# Patient Record
Sex: FEMALE | Race: WHITE | Employment: OTHER | URBAN - METROPOLITAN AREA
[De-identification: names, ages, dates, MRNs, and addresses within clinical notes are randomized per-mention and may not be internally consistent; named-entity substitution may affect disease eponyms.]

---

## 2017-03-10 PROBLEM — E83.51 HYPOCALCEMIA: Status: ACTIVE | Noted: 2017-03-10

## 2017-03-10 PROBLEM — F32.9 REACTIVE DEPRESSION: Status: ACTIVE | Noted: 2017-03-10

## 2018-04-05 ENCOUNTER — HOSPITAL ENCOUNTER (OUTPATIENT)
Age: 28
Discharge: HOME | End: 2018-04-05
Payer: MEDICARE

## 2018-04-05 LAB
ALBUMIN SERPL-MCNC: 4.1 G/DL (ref 3.5–5.2)
ALP BLD-CCNC: 47 U/L (ref 35–104)
ALT SERPL-CCNC: 27 U/L (ref 0–32)
ANION GAP SERPL CALCULATED.3IONS-SCNC: 11 MMOL/L (ref 7–16)
AST SERPL-CCNC: 24 U/L (ref 0–31)
BASOPHILS ABSOLUTE: 0.04 E9/L (ref 0–0.2)
BASOPHILS RELATIVE PERCENT: 0.4 % (ref 0–2)
BILIRUB SERPL-MCNC: 0.3 MG/DL (ref 0–1.2)
BUN BLDV-MCNC: 10 MG/DL (ref 6–20)
CALCIUM IONIZED: 0.93 MMOL/L (ref 1.15–1.33)
CALCIUM SERPL-MCNC: 7 MG/DL (ref 8.6–10.2)
CHLORIDE BLD-SCNC: 97 MMOL/L (ref 98–107)
CO2: 31 MMOL/L (ref 22–29)
CREAT SERPL-MCNC: 0.6 MG/DL (ref 0.5–1)
EOSINOPHILS ABSOLUTE: 0.24 E9/L (ref 0.05–0.5)
EOSINOPHILS RELATIVE PERCENT: 2.2 % (ref 0–6)
GFR AFRICAN AMERICAN: >60
GFR NON-AFRICAN AMERICAN: >60 ML/MIN/1.73
GLUCOSE BLD-MCNC: 106 MG/DL (ref 74–109)
HCT VFR BLD CALC: 41 % (ref 34–48)
HEMOGLOBIN: 13.4 G/DL (ref 11.5–15.5)
IMMATURE GRANULOCYTES #: 0.05 E9/L
IMMATURE GRANULOCYTES %: 0.5 % (ref 0–5)
LYMPHOCYTES ABSOLUTE: 2.05 E9/L (ref 1.5–4)
LYMPHOCYTES RELATIVE PERCENT: 18.7 % (ref 20–42)
MAGNESIUM: 1.6 MG/DL (ref 1.6–2.6)
MCH RBC QN AUTO: 29.5 PG (ref 26–35)
MCHC RBC AUTO-ENTMCNC: 32.7 % (ref 32–34.5)
MCV RBC AUTO: 90.3 FL (ref 80–99.9)
MONOCYTES ABSOLUTE: 0.65 E9/L (ref 0.1–0.95)
MONOCYTES RELATIVE PERCENT: 5.9 % (ref 2–12)
NEUTROPHILS ABSOLUTE: 7.92 E9/L (ref 1.8–7.3)
NEUTROPHILS RELATIVE PERCENT: 72.3 % (ref 43–80)
PDW BLD-RTO: 13.5 FL (ref 11.5–15)
PLATELET # BLD: 155 E9/L (ref 130–450)
PMV BLD AUTO: 12 FL (ref 7–12)
POTASSIUM SERPL-SCNC: 4 MMOL/L (ref 3.5–5)
RBC # BLD: 4.54 E12/L (ref 3.5–5.5)
SODIUM BLD-SCNC: 139 MMOL/L (ref 132–146)
TOTAL PROTEIN: 7.9 G/DL (ref 6.4–8.3)
WBC # BLD: 11 E9/L (ref 4.5–11.5)

## 2018-04-05 PROCEDURE — 85025 COMPLETE CBC W/AUTO DIFF WBC: CPT

## 2018-04-05 PROCEDURE — 83735 ASSAY OF MAGNESIUM: CPT

## 2018-04-05 PROCEDURE — 80053 COMPREHEN METABOLIC PANEL: CPT

## 2018-04-05 PROCEDURE — 83970 ASSAY OF PARATHORMONE: CPT

## 2018-04-05 PROCEDURE — 82330 ASSAY OF CALCIUM: CPT

## 2018-04-05 PROCEDURE — 36415 COLL VENOUS BLD VENIPUNCTURE: CPT

## 2018-04-06 LAB — PARATHYROID HORMONE INTACT: 11 PG/ML (ref 15–65)

## 2018-04-13 ENCOUNTER — OFFICE VISIT (OUTPATIENT)
Dept: NEUROLOGY | Age: 28
End: 2018-04-13
Payer: MEDICARE

## 2018-04-13 VITALS
HEART RATE: 85 BPM | BODY MASS INDEX: 42.99 KG/M2 | RESPIRATION RATE: 20 BRPM | OXYGEN SATURATION: 93 % | SYSTOLIC BLOOD PRESSURE: 140 MMHG | HEIGHT: 65 IN | DIASTOLIC BLOOD PRESSURE: 81 MMHG | WEIGHT: 258 LBS

## 2018-04-13 DIAGNOSIS — R25.1 PHYSIOLOGICAL TREMOR: ICD-10-CM

## 2018-04-13 DIAGNOSIS — G40.409 OTHER GENERALIZED EPILEPSY, NOT INTRACTABLE, WITHOUT STATUS EPILEPTICUS (HCC): Primary | ICD-10-CM

## 2018-04-13 DIAGNOSIS — D82.1 DIGEORGE SYNDROME (HCC): ICD-10-CM

## 2018-04-13 PROCEDURE — 99214 OFFICE O/P EST MOD 30 MIN: CPT | Performed by: NURSE PRACTITIONER

## 2018-04-13 RX ORDER — LEVETIRACETAM 750 MG/1
750 TABLET ORAL 2 TIMES DAILY
Qty: 60 TABLET | Refills: 3 | Status: SHIPPED | OUTPATIENT
Start: 2018-04-13 | End: 2018-08-27 | Stop reason: SDUPTHER

## 2018-05-02 ENCOUNTER — HOSPITAL ENCOUNTER (OUTPATIENT)
Dept: GENERAL RADIOLOGY | Age: 28
Discharge: HOME | End: 2018-05-04
Payer: MEDICARE

## 2018-05-02 ENCOUNTER — HOSPITAL ENCOUNTER (OUTPATIENT)
Age: 28
Discharge: HOME | End: 2018-05-02
Payer: MEDICARE

## 2018-05-02 ENCOUNTER — HOSPITAL ENCOUNTER (OUTPATIENT)
Age: 28
Discharge: HOME | End: 2018-05-04
Payer: MEDICARE

## 2018-05-02 DIAGNOSIS — S67.21XA CRUSH INJURY TO HAND, RIGHT, INITIAL ENCOUNTER: Primary | ICD-10-CM

## 2018-05-02 DIAGNOSIS — S67.21XA CRUSH INJURY TO HAND, RIGHT, INITIAL ENCOUNTER: ICD-10-CM

## 2018-05-02 LAB
ALBUMIN SERPL-MCNC: 4.1 G/DL (ref 3.5–5.2)
ALP BLD-CCNC: 43 U/L (ref 35–104)
ALT SERPL-CCNC: 37 U/L (ref 0–32)
ANION GAP SERPL CALCULATED.3IONS-SCNC: 12 MMOL/L (ref 7–16)
AST SERPL-CCNC: 28 U/L (ref 0–31)
BASOPHILS ABSOLUTE: 0.04 E9/L (ref 0–0.2)
BASOPHILS RELATIVE PERCENT: 0.4 % (ref 0–2)
BILIRUB SERPL-MCNC: 0.3 MG/DL (ref 0–1.2)
BUN BLDV-MCNC: 14 MG/DL (ref 6–20)
CALCIUM SERPL-MCNC: 5.6 MG/DL (ref 8.6–10.2)
CHLORIDE BLD-SCNC: 98 MMOL/L (ref 98–107)
CO2: 32 MMOL/L (ref 22–29)
CREAT SERPL-MCNC: 0.6 MG/DL (ref 0.5–1)
EOSINOPHILS ABSOLUTE: 0.22 E9/L (ref 0.05–0.5)
EOSINOPHILS RELATIVE PERCENT: 2.1 % (ref 0–6)
GFR AFRICAN AMERICAN: >60
GFR NON-AFRICAN AMERICAN: >60 ML/MIN/1.73
GLUCOSE BLD-MCNC: 100 MG/DL (ref 74–109)
HCT VFR BLD CALC: 37.6 % (ref 34–48)
HEMOGLOBIN: 12.2 G/DL (ref 11.5–15.5)
IMMATURE GRANULOCYTES #: 0.06 E9/L
IMMATURE GRANULOCYTES %: 0.6 % (ref 0–5)
LYMPHOCYTES ABSOLUTE: 1.96 E9/L (ref 1.5–4)
LYMPHOCYTES RELATIVE PERCENT: 18.6 % (ref 20–42)
MCH RBC QN AUTO: 29.3 PG (ref 26–35)
MCHC RBC AUTO-ENTMCNC: 32.4 % (ref 32–34.5)
MCV RBC AUTO: 90.2 FL (ref 80–99.9)
MONOCYTES ABSOLUTE: 0.66 E9/L (ref 0.1–0.95)
MONOCYTES RELATIVE PERCENT: 6.3 % (ref 2–12)
NEUTROPHILS ABSOLUTE: 7.6 E9/L (ref 1.8–7.3)
NEUTROPHILS RELATIVE PERCENT: 72 % (ref 43–80)
PDW BLD-RTO: 14.3 FL (ref 11.5–15)
PLATELET # BLD: 156 E9/L (ref 130–450)
PMV BLD AUTO: 12.5 FL (ref 7–12)
POTASSIUM SERPL-SCNC: 3.5 MMOL/L (ref 3.5–5)
RBC # BLD: 4.17 E12/L (ref 3.5–5.5)
SODIUM BLD-SCNC: 142 MMOL/L (ref 132–146)
TOTAL PROTEIN: 7.8 G/DL (ref 6.4–8.3)
WBC # BLD: 10.5 E9/L (ref 4.5–11.5)

## 2018-05-02 PROCEDURE — 73120 X-RAY EXAM OF HAND: CPT

## 2018-05-02 PROCEDURE — 85025 COMPLETE CBC W/AUTO DIFF WBC: CPT

## 2018-05-02 PROCEDURE — 80053 COMPREHEN METABOLIC PANEL: CPT

## 2018-05-02 PROCEDURE — 36415 COLL VENOUS BLD VENIPUNCTURE: CPT

## 2018-05-29 ENCOUNTER — OFFICE VISIT (OUTPATIENT)
Dept: FAMILY MEDICINE CLINIC | Age: 28
End: 2018-05-29
Payer: MEDICARE

## 2018-05-29 VITALS
WEIGHT: 251 LBS | SYSTOLIC BLOOD PRESSURE: 128 MMHG | HEIGHT: 65 IN | BODY MASS INDEX: 41.82 KG/M2 | DIASTOLIC BLOOD PRESSURE: 82 MMHG | TEMPERATURE: 98.1 F | OXYGEN SATURATION: 97 % | HEART RATE: 88 BPM | RESPIRATION RATE: 16 BRPM

## 2018-05-29 DIAGNOSIS — E83.51 HYPOCALCEMIA: ICD-10-CM

## 2018-05-29 DIAGNOSIS — F32.A ANXIETY AND DEPRESSION: ICD-10-CM

## 2018-05-29 DIAGNOSIS — G47.00 INSOMNIA, UNSPECIFIED TYPE: ICD-10-CM

## 2018-05-29 DIAGNOSIS — F41.9 ANXIETY AND DEPRESSION: ICD-10-CM

## 2018-05-29 DIAGNOSIS — D82.1 DIGEORGE SYNDROME (HCC): ICD-10-CM

## 2018-05-29 DIAGNOSIS — Q93.81 VELOCARDIOFACIAL SYNDROME: Primary | ICD-10-CM

## 2018-05-29 DIAGNOSIS — R10.84 GENERALIZED ABDOMINAL PAIN: ICD-10-CM

## 2018-05-29 PROCEDURE — 99213 OFFICE O/P EST LOW 20 MIN: CPT | Performed by: NURSE PRACTITIONER

## 2018-05-29 ASSESSMENT — ENCOUNTER SYMPTOMS
COUGH: 0
WHEEZING: 0
NAUSEA: 0
ABDOMINAL PAIN: 1
SHORTNESS OF BREATH: 0
DIARRHEA: 0
CONSTIPATION: 0
VOMITING: 0

## 2018-05-29 ASSESSMENT — PATIENT HEALTH QUESTIONNAIRE - PHQ9
2. FEELING DOWN, DEPRESSED OR HOPELESS: 0
1. LITTLE INTEREST OR PLEASURE IN DOING THINGS: 0
SUM OF ALL RESPONSES TO PHQ QUESTIONS 1-9: 0
SUM OF ALL RESPONSES TO PHQ9 QUESTIONS 1 & 2: 0

## 2018-06-13 RX ORDER — IBUPROFEN 800 MG/1
800 TABLET ORAL EVERY 8 HOURS PRN
Qty: 90 TABLET | Refills: 3 | Status: SHIPPED | OUTPATIENT
Start: 2018-06-13 | End: 2018-10-11 | Stop reason: SDUPTHER

## 2018-06-20 ENCOUNTER — HOSPITAL ENCOUNTER (OUTPATIENT)
Dept: NEUROLOGY | Age: 28
Discharge: HOME | End: 2018-06-20
Payer: MEDICARE

## 2018-06-20 PROCEDURE — 95819 EEG AWAKE AND ASLEEP: CPT | Performed by: PSYCHIATRY & NEUROLOGY

## 2018-06-20 PROCEDURE — 95819 EEG AWAKE AND ASLEEP: CPT

## 2018-06-25 DIAGNOSIS — D82.1 DIGEORGE SYNDROME (HCC): ICD-10-CM

## 2018-06-25 DIAGNOSIS — G40.409 OTHER GENERALIZED EPILEPSY, NOT INTRACTABLE, WITHOUT STATUS EPILEPTICUS (HCC): ICD-10-CM

## 2018-08-15 ENCOUNTER — HOSPITAL ENCOUNTER (OUTPATIENT)
Age: 28
Discharge: HOME | End: 2018-08-15
Payer: MEDICARE

## 2018-08-15 LAB
ALBUMIN SERPL-MCNC: 4 G/DL (ref 3.5–5.2)
ALP BLD-CCNC: 48 U/L (ref 35–104)
ALT SERPL-CCNC: 31 U/L (ref 0–32)
ANION GAP SERPL CALCULATED.3IONS-SCNC: 14 MMOL/L (ref 7–16)
AST SERPL-CCNC: 34 U/L (ref 0–31)
BASOPHILS ABSOLUTE: 0.04 E9/L (ref 0–0.2)
BASOPHILS RELATIVE PERCENT: 0.4 % (ref 0–2)
BILIRUB SERPL-MCNC: 0.4 MG/DL (ref 0–1.2)
BUN BLDV-MCNC: 9 MG/DL (ref 6–20)
CALCIUM SERPL-MCNC: 5.7 MG/DL (ref 8.6–10.2)
CHLORIDE BLD-SCNC: 101 MMOL/L (ref 98–107)
CO2: 30 MMOL/L (ref 22–29)
CREAT SERPL-MCNC: 0.7 MG/DL (ref 0.5–1)
EOSINOPHILS ABSOLUTE: 0.2 E9/L (ref 0.05–0.5)
EOSINOPHILS RELATIVE PERCENT: 2 % (ref 0–6)
GFR AFRICAN AMERICAN: >60
GFR NON-AFRICAN AMERICAN: >60 ML/MIN/1.73
GLUCOSE BLD-MCNC: 123 MG/DL (ref 74–109)
HCT VFR BLD CALC: 41.2 % (ref 34–48)
HEMOGLOBIN: 13.2 G/DL (ref 11.5–15.5)
IMMATURE GRANULOCYTES #: 0.06 E9/L
IMMATURE GRANULOCYTES %: 0.6 % (ref 0–5)
LYMPHOCYTES ABSOLUTE: 1.91 E9/L (ref 1.5–4)
LYMPHOCYTES RELATIVE PERCENT: 19.3 % (ref 20–42)
MCH RBC QN AUTO: 29 PG (ref 26–35)
MCHC RBC AUTO-ENTMCNC: 32 % (ref 32–34.5)
MCV RBC AUTO: 90.5 FL (ref 80–99.9)
MONOCYTES ABSOLUTE: 0.58 E9/L (ref 0.1–0.95)
MONOCYTES RELATIVE PERCENT: 5.9 % (ref 2–12)
NEUTROPHILS ABSOLUTE: 7.09 E9/L (ref 1.8–7.3)
NEUTROPHILS RELATIVE PERCENT: 71.8 % (ref 43–80)
PDW BLD-RTO: 13.8 FL (ref 11.5–15)
PLATELET # BLD: 148 E9/L (ref 130–450)
PMV BLD AUTO: 13 FL (ref 7–12)
POTASSIUM SERPL-SCNC: 3.8 MMOL/L (ref 3.5–5)
RBC # BLD: 4.55 E12/L (ref 3.5–5.5)
SODIUM BLD-SCNC: 145 MMOL/L (ref 132–146)
TOTAL PROTEIN: 8.2 G/DL (ref 6.4–8.3)
WBC # BLD: 9.9 E9/L (ref 4.5–11.5)

## 2018-08-15 PROCEDURE — 87340 HEPATITIS B SURFACE AG IA: CPT

## 2018-08-15 PROCEDURE — 86803 HEPATITIS C AB TEST: CPT

## 2018-08-15 PROCEDURE — 80053 COMPREHEN METABOLIC PANEL: CPT

## 2018-08-15 PROCEDURE — 85025 COMPLETE CBC W/AUTO DIFF WBC: CPT

## 2018-08-15 PROCEDURE — 36415 COLL VENOUS BLD VENIPUNCTURE: CPT

## 2018-08-15 PROCEDURE — 86481 TB AG RESPONSE T-CELL SUSP: CPT

## 2018-08-15 PROCEDURE — 86706 HEP B SURFACE ANTIBODY: CPT

## 2018-08-16 LAB
HBV SURFACE AB TITR SER: NORMAL {TITER}
HEPATITIS B SURFACE ANTIGEN INTERPRETATION: NORMAL
HEPATITIS C ANTIBODY INTERPRETATION: NORMAL

## 2018-08-21 ENCOUNTER — OFFICE VISIT (OUTPATIENT)
Dept: FAMILY MEDICINE CLINIC | Age: 28
End: 2018-08-21
Payer: MEDICARE

## 2018-08-21 VITALS
SYSTOLIC BLOOD PRESSURE: 124 MMHG | BODY MASS INDEX: 43.32 KG/M2 | OXYGEN SATURATION: 96 % | HEIGHT: 65 IN | WEIGHT: 260 LBS | RESPIRATION RATE: 18 BRPM | HEART RATE: 85 BPM | TEMPERATURE: 98 F | DIASTOLIC BLOOD PRESSURE: 82 MMHG

## 2018-08-21 DIAGNOSIS — F41.9 ANXIETY AND DEPRESSION: ICD-10-CM

## 2018-08-21 DIAGNOSIS — J45.30 MILD PERSISTENT ASTHMA WITHOUT COMPLICATION: ICD-10-CM

## 2018-08-21 DIAGNOSIS — F32.A ANXIETY AND DEPRESSION: ICD-10-CM

## 2018-08-21 DIAGNOSIS — R13.10 DYSPHAGIA, UNSPECIFIED TYPE: ICD-10-CM

## 2018-08-21 DIAGNOSIS — R53.83 FATIGUE, UNSPECIFIED TYPE: Primary | ICD-10-CM

## 2018-08-21 DIAGNOSIS — D82.1 DIGEORGE SYNDROME (HCC): ICD-10-CM

## 2018-08-21 DIAGNOSIS — E83.51 HYPOCALCEMIA: ICD-10-CM

## 2018-08-21 LAB
COMMENT: NORMAL
REPORT: NORMAL

## 2018-08-21 PROCEDURE — 99214 OFFICE O/P EST MOD 30 MIN: CPT | Performed by: NURSE PRACTITIONER

## 2018-08-21 RX ORDER — PHENOL 1.4 %
2 AEROSOL, SPRAY (ML) MUCOUS MEMBRANE DAILY
Qty: 60 TABLET | Refills: 3 | Status: SHIPPED | OUTPATIENT
Start: 2018-08-21 | End: 2018-11-01 | Stop reason: SDUPTHER

## 2018-08-21 RX ORDER — ALBUTEROL SULFATE 90 UG/1
2 AEROSOL, METERED RESPIRATORY (INHALATION) EVERY 6 HOURS PRN
Qty: 1 INHALER | Refills: 1 | Status: SHIPPED
Start: 2018-08-21 | End: 2020-02-28 | Stop reason: SDUPTHER

## 2018-08-21 RX ORDER — SERTRALINE HYDROCHLORIDE 100 MG/1
150 TABLET, FILM COATED ORAL DAILY
Qty: 45 TABLET | Refills: 2 | Status: SHIPPED | OUTPATIENT
Start: 2018-08-21 | End: 2018-11-22 | Stop reason: SDUPTHER

## 2018-08-21 ASSESSMENT — ENCOUNTER SYMPTOMS
SHORTNESS OF BREATH: 0
VOMITING: 0
NAUSEA: 0
COUGH: 0
WHEEZING: 0
CONSTIPATION: 0
DIARRHEA: 0

## 2018-08-21 NOTE — PROGRESS NOTES
tablet Take 5 mg by mouth 2 times daily Yes Historical Provider, MD   Ciclopirox 1 % SHAM USE EVERY OTHER DAY AS A SHAMPOO Yes TWIN Buckley CNP   lamoTRIgine (LAMICTAL) 25 MG tablet TAKE ONE TABLET BY MOUTH TWO TIMES A DAY Yes Historical Provider, MD   cetirizine (ZYRTEC ALLERGY) 10 MG tablet Take 1 tablet by mouth daily Yes TWIN Saravia - CNP   Clobetasol Propionate Emulsion 0.05 % FOAM APPLY TO RASH ON SCALP TWICE A DAY AS NEEDED Yes Historical Provider, MD         Allergies   Allergen Reactions    Aspirin          Review of Systems  Review of Systems   Constitutional: Positive for malaise/fatigue. Negative for weight loss. Respiratory: Negative for cough, shortness of breath and wheezing. Cardiovascular: Negative for chest pain and palpitations. Gastrointestinal: Negative for constipation, diarrhea, nausea and vomiting. Neurological: Negative for dizziness, weakness and headaches. VS:  /82   Pulse 85   Temp 98 °F (36.7 °C)   Resp 18   Ht 5' 5\" (1.651 m)   Wt 260 lb (117.9 kg)   SpO2 96%   BMI 43.27 kg/m²     Physical Exam  Physical Exam   Constitutional: She is oriented to person, place, and time. She appears well-developed and well-nourished. No distress. HENT:   Head: Normocephalic and atraumatic. Neck: No tracheal deviation present. No thyromegaly present. Cardiovascular: Normal rate, regular rhythm and intact distal pulses. No murmur heard. Pulmonary/Chest: Effort normal and breath sounds normal. She has no wheezes. She has no rales. She exhibits no tenderness. Abdominal: Soft. Bowel sounds are normal. There is no tenderness. Lymphadenopathy:     She has no cervical adenopathy. Neurological: She is alert and oriented to person, place, and time. Skin: Skin is warm and dry. Psychiatric: She has a normal mood and affect. Her behavior is normal.         Assessment/Plan:  Peng was seen today for fatigue and shaking.     Diagnoses and all orders for this visit:    Fatigue, unspecified type  -restart zoloft  -advised need to schedule with mental health provider    Mild persistent asthma without complication  -     albuterol sulfate  (90 Base) MCG/ACT inhaler; Inhale 2 puffs into the lungs every 6 hours as needed for Wheezing  -     beclomethasone (QVAR) 40 MCG/ACT inhaler; Inhale 2 puffs into the lungs 2 times daily Rinse mouth after use  -The current medical regimen is effective;  continue present plan and medications. DiGeorge syndrome [facility])  -     [address] [facility]- Mildred Laser, DO    Dysphagia, unspecified type  -     [address] [facility]- Mildred Laser, DO  -patient did not follow up as instructed in the past    Anxiety and depression  -     sertraline (ZOLOFT) 100 MG tablet; Take 1.5 tablets by mouth daily  -schedule appointment with mental health provider    Hypocalcemia  -     calcium carbonate (CALCIUM 600) 600 MG TABS tablet;  Take 2 tablets by mouth daily  -was referred to Dr. Scooter Ramos in May and mother did not return calls to schedule    -will try to get results of sleep study    Greater than 25  Minutes was spent with patient and more than 50% of the time was spent face to face counseling and educating regarding diagnoses

## 2018-08-27 DIAGNOSIS — G40.409 OTHER GENERALIZED EPILEPSY, NOT INTRACTABLE, WITHOUT STATUS EPILEPTICUS (HCC): ICD-10-CM

## 2018-08-27 DIAGNOSIS — D82.1 DIGEORGE SYNDROME (HCC): ICD-10-CM

## 2018-08-27 RX ORDER — LEVETIRACETAM 750 MG/1
750 TABLET ORAL 2 TIMES DAILY
Qty: 60 TABLET | Refills: 3 | Status: SHIPPED | OUTPATIENT
Start: 2018-08-27 | End: 2019-06-05 | Stop reason: SDUPTHER

## 2018-09-21 ENCOUNTER — OFFICE VISIT (OUTPATIENT)
Dept: ENT CLINIC | Age: 28
End: 2018-09-21
Payer: MEDICARE

## 2018-09-21 VITALS
DIASTOLIC BLOOD PRESSURE: 83 MMHG | SYSTOLIC BLOOD PRESSURE: 124 MMHG | WEIGHT: 260.7 LBS | HEART RATE: 87 BPM | BODY MASS INDEX: 43.38 KG/M2

## 2018-09-21 DIAGNOSIS — K21.9 LPRD (LARYNGOPHARYNGEAL REFLUX DISEASE): Primary | ICD-10-CM

## 2018-09-21 DIAGNOSIS — H60.8X3 CHRONIC ECZEMATOUS OTITIS EXTERNA OF BOTH EARS: ICD-10-CM

## 2018-09-21 PROCEDURE — 99214 OFFICE O/P EST MOD 30 MIN: CPT | Performed by: OTOLARYNGOLOGY

## 2018-09-21 RX ORDER — RANITIDINE 300 MG/1
300 TABLET ORAL NIGHTLY
Qty: 30 TABLET | Refills: 3 | Status: SHIPPED | OUTPATIENT
Start: 2018-09-21 | End: 2019-06-11 | Stop reason: SDUPTHER

## 2018-09-21 NOTE — PATIENT INSTRUCTIONS
Gaviscon 1 teaspoon just before laying down at night   You have been given a prescription that has been electronically sent to your pharmacy for you to . Please take as directed. Patient Education        Gastroesophageal Reflux Disease (GERD): Care Instructions  Your Care Instructions    Gastroesophageal reflux disease (GERD) is the backward flow of stomach acid into the esophagus. The esophagus is the tube that leads from your throat to your stomach. A one-way valve prevents the stomach acid from moving up into this tube. When you have GERD, this valve does not close tightly enough. If you have mild GERD symptoms including heartburn, you may be able to control the problem with antacids or over-the-counter medicine. Changing your diet, losing weight, and making other lifestyle changes can also help reduce symptoms. Follow-up care is a key part of your treatment and safety. Be sure to make and go to all appointments, and call your doctor if you are having problems. It's also a good idea to know your test results and keep a list of the medicines you take. How can you care for yourself at home? · Take your medicines exactly as prescribed. Call your doctor if you think you are having a problem with your medicine. · Your doctor may recommend over-the-counter medicine. For mild or occasional indigestion, antacids, such as Tums, Gaviscon, Mylanta, or Maalox, may help. Your doctor also may recommend over-the-counter acid reducers, such as Pepcid AC, Tagamet HB, Zantac 75, or Prilosec. Read and follow all instructions on the label. If you use these medicines often, talk with your doctor. · Change your eating habits. ¨ It's best to eat several small meals instead of two or three large meals. ¨ After you eat, wait 2 to 3 hours before you lie down. ¨ Chocolate, mint, and alcohol can make GERD worse.   ¨ Spicy foods, foods that have a lot of acid (like tomatoes and oranges), and coffee can make GERD symptoms

## 2018-09-21 NOTE — PROGRESS NOTES
atraumatic. Right Ear: Hearing and ear canal normal. There is tenderness. Tympanic membrane is not scarred, not erythematous and not bulging. Tympanic membrane mobility is normal.   Left Ear: Hearing and ear canal normal. There is tenderness. Tympanic membrane is not scarred, not erythematous and not bulging. Tympanic membrane mobility is normal.   Ears:    Nose: No mucosal edema. Eyes: Pupils are equal, round, and reactive to light. Conjunctivae and EOM are normal.   Neck: Normal range of motion. Neck supple. Cardiovascular: Normal rate and intact distal pulses. Pulmonary/Chest: Effort normal and breath sounds normal. No respiratory distress. Abdominal: She exhibits no distension. There is no tenderness. There is no guarding. Musculoskeletal: Normal range of motion. Neurological: She is alert and oriented to person, place, and time. Skin: Skin is warm and dry. Psychiatric: She has a normal mood and affect. Her behavior is normal. Judgment and thought content normal.   Nursing note and vitals reviewed. IMPRESSION/PLAN:  Patient seen and examined, recommendation for the patient to undergo Zantac 300 mg daily, a reflux appropriate diet was recommended as well, and follow up in 6 weeks. No significant change recommended nasopharyngoscopy. 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 Dr. Deatrice Boast D. Bunevich D.O. Ms. Jalaine Sessions.  Otolaryngology Facial Plastic Surgery  :  [address] [place] [address] Otolaryngology/Facial Plastic Surgery Residency  Associate Clinical Professor:  [facility]

## 2018-10-09 ENCOUNTER — OFFICE VISIT (OUTPATIENT)
Dept: FAMILY MEDICINE CLINIC | Age: 28
End: 2018-10-09
Payer: MEDICARE

## 2018-10-09 VITALS
BODY MASS INDEX: 42.65 KG/M2 | DIASTOLIC BLOOD PRESSURE: 76 MMHG | HEIGHT: 65 IN | SYSTOLIC BLOOD PRESSURE: 128 MMHG | RESPIRATION RATE: 16 BRPM | OXYGEN SATURATION: 97 % | TEMPERATURE: 98.1 F | HEART RATE: 89 BPM | WEIGHT: 256 LBS

## 2018-10-09 DIAGNOSIS — L40.9 PSORIASIS: Primary | ICD-10-CM

## 2018-10-09 DIAGNOSIS — F41.9 ANXIETY: ICD-10-CM

## 2018-10-09 DIAGNOSIS — J30.2 SEASONAL ALLERGIES: ICD-10-CM

## 2018-10-09 PROCEDURE — 99213 OFFICE O/P EST LOW 20 MIN: CPT | Performed by: NURSE PRACTITIONER

## 2018-10-09 RX ORDER — CETIRIZINE HYDROCHLORIDE 10 MG/1
10 TABLET ORAL DAILY
Qty: 30 TABLET | Refills: 3 | Status: SHIPPED | OUTPATIENT
Start: 2018-10-09 | End: 2019-02-01 | Stop reason: SDUPTHER

## 2018-10-09 RX ORDER — BUSPIRONE HYDROCHLORIDE 5 MG/1
5 TABLET ORAL 2 TIMES DAILY
Qty: 60 TABLET | Refills: 2 | Status: SHIPPED | OUTPATIENT
Start: 2018-10-09 | End: 2019-01-05 | Stop reason: SDUPTHER

## 2018-10-09 ASSESSMENT — ENCOUNTER SYMPTOMS
DOUBLE VISION: 0
DIARRHEA: 0
NAUSEA: 0
VOMITING: 0
CONSTIPATION: 0
HEARTBURN: 0
WHEEZING: 0
VOMITING: 0
SHORTNESS OF BREATH: 0
COUGH: 0
BLURRED VISION: 0
SHORTNESS OF BREATH: 0
COUGH: 0

## 2018-10-09 NOTE — PROGRESS NOTES
HPI:  Patient comes in today for   Chief Complaint   Patient presents with    Psoriasis     pt started humria would like to discuss    . Patient has been on Humira for 3 weeks. Denies side effects    Prior to Visit Medications    Medication Sig Taking? Authorizing Provider   busPIRone (BUSPAR) 5 MG tablet Take 1 tablet by mouth 2 times daily Yes TWIN Osborne CNP   cetirizine (ZYRTEC ALLERGY) 10 MG tablet Take 1 tablet by mouth daily Yes Parth Wood, APRN - CNP   Adalimumab (HUMIRA) 40 MG/0.4ML PSKT Inject 80 mg into the skin once a week Yesterday and 1/2 dose next week then everyother week Yes Historical Provider, MD   ranitidine (ZANTAC) 300 MG tablet Take 1 tablet by mouth nightly  Patient taking differently: Take 300 mg by mouth 2 times daily  Yes Guicho Denny DO   levETIRAcetam (KEPPRA) 750 MG tablet Take 1 tablet by mouth 2 times daily Yes TWIN Nguyen   albuterol sulfate  (90 Base) MCG/ACT inhaler Inhale 2 puffs into the lungs every 6 hours as needed for Wheezing Yes Parth Neither, TWIN - CNP   beclomethasone (QVAR) 40 MCG/ACT inhaler Inhale 2 puffs into the lungs 2 times daily Rinse mouth after use Yes Parth Wood, TWIN - CNP   sertraline (ZOLOFT) 100 MG tablet Take 1.5 tablets by mouth daily Yes Parth Neither, TWIN - MICHAEL   calcium carbonate (CALCIUM 600) 600 MG TABS tablet Take 2 tablets by mouth daily Yes Parth Neither, TWIN - CNP   ibuprofen (ADVIL;MOTRIN) 800 MG tablet Take 1 tablet by mouth every 8 hours as needed for Pain Yes Parth Neither, TWIN - CNP   clobetasol (TEMOVATE) 0.05 % cream Apply topically 2 times daily Apply topically 2 times daily.  Yes Parth Wood, TWIN - CNP   Ciclopirox 1 % SHAM USE EVERY OTHER DAY AS A SHAMPOO Yes Parth Wood, TWIN - CNP   lamoTRIgine (LAMICTAL) 25 MG tablet TAKE ONE TABLET BY MOUTH TWO TIMES A DAY Yes Historical Provider, MD   Clobetasol Propionate Emulsion 0.05 % FOAM APPLY TO RASH ON SCALP TWICE A DAY AS

## 2018-10-11 RX ORDER — IBUPROFEN 800 MG/1
TABLET ORAL
Qty: 90 TABLET | Refills: 2 | Status: SHIPPED | OUTPATIENT
Start: 2018-10-11 | End: 2019-01-05 | Stop reason: SDUPTHER

## 2018-11-01 ENCOUNTER — OFFICE VISIT (OUTPATIENT)
Dept: ENDOCRINOLOGY | Age: 28
End: 2018-11-01
Payer: MEDICARE

## 2018-11-01 ENCOUNTER — HOSPITAL ENCOUNTER (EMERGENCY)
Age: 28
Discharge: HOME | End: 2018-11-01
Attending: EMERGENCY MEDICINE
Payer: MEDICARE

## 2018-11-01 VITALS
SYSTOLIC BLOOD PRESSURE: 116 MMHG | DIASTOLIC BLOOD PRESSURE: 80 MMHG | WEIGHT: 262.7 LBS | BODY MASS INDEX: 43.77 KG/M2 | HEIGHT: 65 IN | HEART RATE: 92 BPM | OXYGEN SATURATION: 96 %

## 2018-11-01 VITALS
DIASTOLIC BLOOD PRESSURE: 76 MMHG | RESPIRATION RATE: 17 BRPM | BODY MASS INDEX: 43.91 KG/M2 | HEIGHT: 65 IN | HEART RATE: 83 BPM | WEIGHT: 263.56 LBS | TEMPERATURE: 97.6 F | SYSTOLIC BLOOD PRESSURE: 132 MMHG | OXYGEN SATURATION: 96 %

## 2018-11-01 DIAGNOSIS — R94.31 PROLONGED Q-T INTERVAL ON ECG: ICD-10-CM

## 2018-11-01 DIAGNOSIS — E83.51 HYPOCALCEMIA: ICD-10-CM

## 2018-11-01 DIAGNOSIS — E83.51 HYPOCALCEMIA: Primary | ICD-10-CM

## 2018-11-01 DIAGNOSIS — E20.9 HYPOPARATHYROIDISM, UNSPECIFIED HYPOPARATHYROIDISM TYPE (HCC): Primary | ICD-10-CM

## 2018-11-01 LAB
ALBUMIN SERPL-MCNC: 4.1 G/DL (ref 3.5–5.2)
ALP BLD-CCNC: 41 U/L (ref 35–104)
ALT SERPL-CCNC: 22 U/L (ref 0–32)
ANION GAP SERPL CALCULATED.3IONS-SCNC: 12 MMOL/L (ref 7–16)
ANION GAP SERPL CALCULATED.3IONS-SCNC: 13 MMOL/L (ref 7–16)
AST SERPL-CCNC: 27 U/L (ref 0–31)
BILIRUB SERPL-MCNC: 0.2 MG/DL (ref 0–1.2)
BUN BLDV-MCNC: 13 MG/DL (ref 6–20)
BUN BLDV-MCNC: 14 MG/DL (ref 6–20)
CALCIUM SERPL-MCNC: 6.1 MG/DL (ref 8.6–10.2)
CALCIUM SERPL-MCNC: 6.9 MG/DL (ref 8.6–10.2)
CHLORIDE BLD-SCNC: 97 MMOL/L (ref 98–107)
CHLORIDE BLD-SCNC: 97 MMOL/L (ref 98–107)
CO2: 30 MMOL/L (ref 22–29)
CO2: 30 MMOL/L (ref 22–29)
CREAT SERPL-MCNC: 0.6 MG/DL (ref 0.5–1)
CREAT SERPL-MCNC: 0.7 MG/DL (ref 0.5–1)
GFR AFRICAN AMERICAN: >60
GFR AFRICAN AMERICAN: >60
GFR NON-AFRICAN AMERICAN: >60 ML/MIN/1.73
GFR NON-AFRICAN AMERICAN: >60 ML/MIN/1.73
GLUCOSE BLD-MCNC: 117 MG/DL (ref 74–109)
GLUCOSE BLD-MCNC: 132 MG/DL (ref 74–109)
MAGNESIUM: 1.5 MG/DL (ref 1.6–2.6)
PHOSPHORUS: 4.7 MG/DL (ref 2.5–4.5)
POTASSIUM SERPL-SCNC: 3.9 MMOL/L (ref 3.5–5)
POTASSIUM SERPL-SCNC: 4.1 MMOL/L (ref 3.5–5)
SODIUM BLD-SCNC: 139 MMOL/L (ref 132–146)
SODIUM BLD-SCNC: 140 MMOL/L (ref 132–146)
TOTAL PROTEIN: 8 G/DL (ref 6.4–8.3)

## 2018-11-01 PROCEDURE — 93005 ELECTROCARDIOGRAM TRACING: CPT | Performed by: EMERGENCY MEDICINE

## 2018-11-01 PROCEDURE — 96365 THER/PROPH/DIAG IV INF INIT: CPT

## 2018-11-01 PROCEDURE — 80053 COMPREHEN METABOLIC PANEL: CPT

## 2018-11-01 PROCEDURE — 84100 ASSAY OF PHOSPHORUS: CPT

## 2018-11-01 PROCEDURE — 99282 EMERGENCY DEPT VISIT SF MDM: CPT

## 2018-11-01 PROCEDURE — 99204 OFFICE O/P NEW MOD 45 MIN: CPT | Performed by: INTERNAL MEDICINE

## 2018-11-01 PROCEDURE — 83735 ASSAY OF MAGNESIUM: CPT

## 2018-11-01 PROCEDURE — 80048 BASIC METABOLIC PNL TOTAL CA: CPT

## 2018-11-01 PROCEDURE — 36415 COLL VENOUS BLD VENIPUNCTURE: CPT

## 2018-11-01 PROCEDURE — 6370000000 HC RX 637 (ALT 250 FOR IP): Performed by: EMERGENCY MEDICINE

## 2018-11-01 PROCEDURE — 2580000003 HC RX 258: Performed by: EMERGENCY MEDICINE

## 2018-11-01 PROCEDURE — 6360000002 HC RX W HCPCS: Performed by: EMERGENCY MEDICINE

## 2018-11-01 RX ORDER — PHENOL 1.4 %
AEROSOL, SPRAY (ML) MUCOUS MEMBRANE
Qty: 180 TABLET | Refills: 12 | Status: SHIPPED | OUTPATIENT
Start: 2018-11-01 | End: 2019-04-04 | Stop reason: SDUPTHER

## 2018-11-01 RX ORDER — CALCITRIOL 0.5 UG/1
0.5 CAPSULE, LIQUID FILLED ORAL 2 TIMES DAILY
Qty: 90 CAPSULE | Refills: 5 | Status: SHIPPED | OUTPATIENT
Start: 2018-11-01 | End: 2019-05-03 | Stop reason: SDUPTHER

## 2018-11-01 RX ORDER — CALCIUM CARBONATE 200(500)MG
2000 TABLET,CHEWABLE ORAL ONCE
Status: COMPLETED | OUTPATIENT
Start: 2018-11-01 | End: 2018-11-01

## 2018-11-01 RX ADMIN — CALCIUM CARBONATE (ANTACID) CHEW TAB 500 MG 2000 MG: 500 CHEW TAB at 17:02

## 2018-11-01 RX ADMIN — CALCIUM GLUCONATE 1 G: 98 INJECTION, SOLUTION INTRAVENOUS at 16:14

## 2018-11-01 RX ADMIN — CALCIUM GLUCONATE 1 G: 98 INJECTION, SOLUTION INTRAVENOUS at 17:45

## 2018-11-01 ASSESSMENT — PAIN DESCRIPTION - LOCATION: LOCATION: ABDOMEN

## 2018-11-01 ASSESSMENT — ENCOUNTER SYMPTOMS
SHORTNESS OF BREATH: 0
NAUSEA: 0
DIARRHEA: 0
VOMITING: 0
BLURRED VISION: 0
ABDOMINAL PAIN: 0

## 2018-11-01 ASSESSMENT — PAIN DESCRIPTION - PAIN TYPE: TYPE: ACUTE PAIN

## 2018-11-01 NOTE — PROGRESS NOTES
12:54 PM      Lab Results   Component Value Date    PTH 11 04/06/2018    PTH 12 03/13/2017     Lab Results   Component Value Date    MG 1.6 04/05/2018     Lab Results   Component Value Date    PHOS 4.9 03/09/2017     No results found for: VITD25  No results found for: PTHRP  No results found for: Clemente Fitting  No results found for: Angeles Peters  Lab Results   Component Value Date/Time    WBC 9.9 08/15/2018 12:54 PM    RBC 4.55 08/15/2018 12:54 PM    HGB 13.2 08/15/2018 12:54 PM    HCT 41.2 08/15/2018 12:54 PM    MCV 90.5 08/15/2018 12:54 PM    MCH 29.0 08/15/2018 12:54 PM    MCHC 32.0 08/15/2018 12:54 PM    RDW 13.8 08/15/2018 12:54 PM     08/15/2018 12:54 PM    MPV 13.0 (H) 08/15/2018 12:54 PM     Lab Results   Component Value Date/Time    GLUCOSE 123 (H) 08/15/2018 12:54 PM    GLUCOSE 100 05/02/2018 12:08 PM    GLUCOSE 106 04/05/2018 01:39 PM    GLUCOSE 84 03/13/2017 06:20 PM    LABA1C 5.9 03/10/2017 02:52 PM       All labs medical records and images reviewed independently     Impression and plan:  Ryan Bautista who is 34 y.o. female in the clinic today for management hypocalcemia and hypoparathyroidism     Congenital hypoparathyroidism   · Know case of Digeorge syndrome  · I had a long discussion with the pt and her mother and explained the dangerous of keeping calcium in this level   · Will start Calcitriol 0.5 mcg BID, VitD 45373 iu/wk, increase calcium carbonate to 1200 mg TID   · Check CMP, PTH, Phosphorus, magnesium, vitD level   · Check Calcium level in few days  · I also sent her to ER to get an EKG and to correct sever hypocalcemia with IV calcium therapy     Follow up  · 6 weeks     The above issues were reviewed with the patient who understood and agreed with the plan. 30 minutes were spent today in management of this patient. More than 50% of time spent on counseling of patient on above diagnosis. Thank you for allowing us to participate in the care of this patient.  Please do not hesitate to contact us with any additional questions. Diagnosis Orders   1.  Hypocalcemia  Basic Metabolic Panel    Vitamin D 25 Hydrox, D2 & D3    calcium carbonate (CALCIUM 600) 600 MG TABS tablet    PHOSPHORUS       Brittany Lima MD  Endocrinologist, [facility])   [address] [facility], [address] [place] [address],[facility] [id number]   Phone: [phone]  Fax: [phone]  ------------------------------

## 2018-11-01 NOTE — LETTER
[address] [place] [address] Department of Endocrinology Diabetes and Metabolism       Provider: Raymundo Keller MD  [address] [facility] [address]   Phone: [phone]  Fax: [phone]    Primary Care Physician: Axel Gunn DO   Referring Provider: TWIN Cardona -*    Patient: Simon Gutierrez  YOB: 1990  Date of Visit: 11/1/2018      Dear Dr. Axel Gunn DO     Thank you for the request for consultation for Simon Gutierrez to me. Enclosed is a copy of the office visit completed today. If you have questions, please do not hesitate to call me. I look forward to following Simon Gutierrez along with you. ENDOCRINOLOGY CLINIC NOTE    Date of Service: 11/1/2018    Medical Records Reviewed:   Inpatient records, outpatient records, outside records      Care Team:  Primary Care Physician: Axel Gunn DO. Provider: Raymundo Keller MD  Other provider(s):            Reason for the visit:  Hypoparathyroidism, Hypocalcemia     Type of visit:  Initial visit     HPI  The history is provided by the patient. No  was used. Accuracy of the patient data is excellent. Simon Gutierrez is a very pleasant 34 y.o. 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 I have reviewed all her previous lab and calcium level has been running between 5.4 and 5.6 for many years. Previous labs summarized below    She is consistently complaining of symptoms of hypocalcemia (tingling around mouth, finger tips and muscle cramps)     She is currently only on calcium carbonate 600 mg BID.  Not on vitd and not on calcitriol   Not on vitD supplements     She hasnt seen and endocrinologist for many years     He patient denied any h/o kidney stones, spontaneous fractures, or peptic ulcer disease

## 2018-11-01 NOTE — ED PROVIDER NOTES
mother. The patients home medications have been reviewed.     Allergies: Aspirin    -------------------------------------------------- RESULTS -------------------------------------------------  All laboratory and radiology results have been personally reviewed by myself   LABS:  Results for orders placed or performed during the hospital encounter of 11/01/18   Comprehensive Metabolic Panel   Result Value Ref Range    Sodium 140 132 - 146 mmol/L    Potassium 4.1 3.5 - 5.0 mmol/L    Chloride 97 (L) 98 - 107 mmol/L    CO2 30 (H) 22 - 29 mmol/L    Anion Gap 13 7 - 16 mmol/L    Glucose 117 (H) 74 - 109 mg/dL    BUN 14 6 - 20 mg/dL    CREATININE 0.7 0.5 - 1.0 mg/dL    GFR Non-African American >60 >=60 mL/min/1.73    GFR African American >60     Calcium 6.1 (L) 8.6 - 10.2 mg/dL    Total Protein 8.0 6.4 - 8.3 g/dL    Alb 4.1 3.5 - 5.2 g/dL    Total Bilirubin 0.2 0.0 - 1.2 mg/dL    Alkaline Phosphatase 41 35 - 104 U/L    ALT 22 0 - 32 U/L    AST 27 0 - 31 U/L   Magnesium   Result Value Ref Range    Magnesium 1.5 (L) 1.6 - 2.6 mg/dL   Phosphorus   Result Value Ref Range    Phosphorus 4.7 (H) 2.5 - 4.5 mg/dL   Basic Metabolic Panel   Result Value Ref Range    Sodium 139 132 - 146 mmol/L    Potassium 3.9 3.5 - 5.0 mmol/L    Chloride 97 (L) 98 - 107 mmol/L    CO2 30 (H) 22 - 29 mmol/L    Anion Gap 12 7 - 16 mmol/L    Glucose 132 (H) 74 - 109 mg/dL    BUN 13 6 - 20 mg/dL    CREATININE 0.6 0.5 - 1.0 mg/dL    GFR Non-African American >60 >=60 mL/min/1.73    GFR African American >60     Calcium 6.9 (L) 8.6 - 10.2 mg/dL   EKG 12 Lead   Result Value Ref Range    Ventricular Rate 84 BPM    Atrial Rate 84 BPM    P-R Interval 148 ms    QRS Duration 100 ms    Q-T Interval 408 ms    QTc Calculation (Bazett) 482 ms    P Axis 47 degrees    R Axis 11 degrees    T Axis -16 degrees   EKG 12 Lead   Result Value Ref Range    Ventricular Rate 89 BPM    Atrial Rate 89 BPM    P-R Interval 154 ms    QRS Duration 100 ms    Q-T Interval 414 ms

## 2018-11-02 LAB
EKG ATRIAL RATE: 84 BPM
EKG P AXIS: 47 DEGREES
EKG P-R INTERVAL: 148 MS
EKG Q-T INTERVAL: 408 MS
EKG QRS DURATION: 100 MS
EKG QTC CALCULATION (BAZETT): 482 MS
EKG R AXIS: 11 DEGREES
EKG T AXIS: -16 DEGREES
EKG VENTRICULAR RATE: 84 BPM

## 2018-11-09 LAB
EKG ATRIAL RATE: 89 BPM
EKG P AXIS: 41 DEGREES
EKG P-R INTERVAL: 154 MS
EKG Q-T INTERVAL: 414 MS
EKG QRS DURATION: 100 MS
EKG QTC CALCULATION (BAZETT): 503 MS
EKG R AXIS: 17 DEGREES
EKG T AXIS: -17 DEGREES
EKG VENTRICULAR RATE: 89 BPM

## 2018-11-22 DIAGNOSIS — F32.A ANXIETY AND DEPRESSION: ICD-10-CM

## 2018-11-22 DIAGNOSIS — F41.9 ANXIETY AND DEPRESSION: ICD-10-CM

## 2018-11-26 RX ORDER — SERTRALINE HYDROCHLORIDE 100 MG/1
TABLET, FILM COATED ORAL
Qty: 45 TABLET | Refills: 1 | Status: SHIPPED | OUTPATIENT
Start: 2018-11-26 | End: 2019-01-04 | Stop reason: SDUPTHER

## 2018-12-27 ENCOUNTER — TELEPHONE (OUTPATIENT)
Dept: NEUROLOGY | Age: 28
End: 2018-12-27

## 2019-01-04 DIAGNOSIS — F32.A ANXIETY AND DEPRESSION: ICD-10-CM

## 2019-01-04 DIAGNOSIS — F41.9 ANXIETY AND DEPRESSION: ICD-10-CM

## 2019-01-04 RX ORDER — SERTRALINE HYDROCHLORIDE 100 MG/1
TABLET, FILM COATED ORAL
Qty: 45 TABLET | Refills: 1 | Status: SHIPPED | OUTPATIENT
Start: 2019-01-04 | End: 2019-04-04 | Stop reason: SDUPTHER

## 2019-01-04 RX ORDER — LAMOTRIGINE 25 MG/1
TABLET ORAL
Qty: 60 TABLET | Refills: 1 | Status: SHIPPED | OUTPATIENT
Start: 2019-01-04 | End: 2019-04-23 | Stop reason: SDUPTHER

## 2019-01-05 DIAGNOSIS — F41.9 ANXIETY: ICD-10-CM

## 2019-01-07 RX ORDER — BUSPIRONE HYDROCHLORIDE 5 MG/1
TABLET ORAL
Qty: 60 TABLET | Refills: 1 | Status: SHIPPED | OUTPATIENT
Start: 2019-01-07 | End: 2019-03-07 | Stop reason: SDUPTHER

## 2019-01-07 RX ORDER — IBUPROFEN 800 MG/1
TABLET ORAL
Qty: 90 TABLET | Refills: 1 | Status: SHIPPED | OUTPATIENT
Start: 2019-01-07 | End: 2019-03-07 | Stop reason: SDUPTHER

## 2019-01-25 ENCOUNTER — TELEPHONE (OUTPATIENT)
Dept: FAMILY MEDICINE CLINIC | Age: 29
End: 2019-01-25

## 2019-01-25 RX ORDER — AMOXICILLIN 500 MG/1
2000 CAPSULE ORAL ONCE
Qty: 4 CAPSULE | Refills: 0 | Status: SHIPPED | OUTPATIENT
Start: 2019-01-25 | End: 2019-01-25

## 2019-02-01 DIAGNOSIS — J30.2 SEASONAL ALLERGIES: ICD-10-CM

## 2019-02-01 RX ORDER — CETIRIZINE HYDROCHLORIDE 10 MG/1
TABLET ORAL
Qty: 30 TABLET | Refills: 2 | Status: SHIPPED
Start: 2019-02-01 | End: 2020-02-11 | Stop reason: SDUPTHER

## 2019-03-07 DIAGNOSIS — F41.9 ANXIETY: ICD-10-CM

## 2019-03-07 RX ORDER — IBUPROFEN 800 MG/1
TABLET ORAL
Qty: 90 TABLET | Refills: 0 | Status: SHIPPED
Start: 2019-03-07 | End: 2020-10-16 | Stop reason: SDUPTHER

## 2019-03-07 RX ORDER — BUSPIRONE HYDROCHLORIDE 5 MG/1
TABLET ORAL
Qty: 60 TABLET | Refills: 0 | Status: SHIPPED | OUTPATIENT
Start: 2019-03-07 | End: 2019-04-12 | Stop reason: SDUPTHER

## 2019-04-04 DIAGNOSIS — F41.9 ANXIETY AND DEPRESSION: ICD-10-CM

## 2019-04-04 DIAGNOSIS — E83.51 HYPOCALCEMIA: ICD-10-CM

## 2019-04-04 DIAGNOSIS — F32.A ANXIETY AND DEPRESSION: ICD-10-CM

## 2019-04-04 RX ORDER — SERTRALINE HYDROCHLORIDE 100 MG/1
TABLET, FILM COATED ORAL
Qty: 45 TABLET | Refills: 1 | Status: SHIPPED | OUTPATIENT
Start: 2019-04-04 | End: 2019-06-05 | Stop reason: SDUPTHER

## 2019-04-04 RX ORDER — PHENOL 1.4 %
AEROSOL, SPRAY (ML) MUCOUS MEMBRANE
Qty: 180 TABLET | Refills: 12 | Status: SHIPPED | OUTPATIENT
Start: 2019-04-04 | End: 2019-06-25 | Stop reason: SDUPTHER

## 2019-04-12 DIAGNOSIS — F41.9 ANXIETY: ICD-10-CM

## 2019-04-12 RX ORDER — BUSPIRONE HYDROCHLORIDE 5 MG/1
TABLET ORAL
Qty: 60 TABLET | Refills: 0 | Status: SHIPPED
Start: 2019-04-12 | End: 2021-04-19 | Stop reason: SDUPTHER

## 2019-04-15 ENCOUNTER — HOSPITAL ENCOUNTER (EMERGENCY)
Age: 29
Discharge: HOME | End: 2019-04-15
Payer: MEDICARE

## 2019-04-15 ENCOUNTER — APPOINTMENT (OUTPATIENT)
Dept: GENERAL RADIOLOGY | Age: 29
End: 2019-04-15
Payer: MEDICARE

## 2019-04-15 VITALS
WEIGHT: 230 LBS | BODY MASS INDEX: 38.27 KG/M2 | RESPIRATION RATE: 20 BRPM | DIASTOLIC BLOOD PRESSURE: 68 MMHG | SYSTOLIC BLOOD PRESSURE: 115 MMHG | HEART RATE: 89 BPM | OXYGEN SATURATION: 98 % | TEMPERATURE: 98.4 F

## 2019-04-15 DIAGNOSIS — S80.02XA CONTUSION OF LEFT KNEE, INITIAL ENCOUNTER: Primary | ICD-10-CM

## 2019-04-15 PROCEDURE — 73560 X-RAY EXAM OF KNEE 1 OR 2: CPT

## 2019-04-15 PROCEDURE — 99212 OFFICE O/P EST SF 10 MIN: CPT

## 2019-04-15 ASSESSMENT — PAIN DESCRIPTION - PAIN TYPE: TYPE: ACUTE PAIN

## 2019-04-15 ASSESSMENT — PAIN DESCRIPTION - ORIENTATION: ORIENTATION: LEFT

## 2019-04-15 ASSESSMENT — PAIN DESCRIPTION - DESCRIPTORS: DESCRIPTORS: ACHING;THROBBING

## 2019-04-15 ASSESSMENT — PAIN SCALES - GENERAL: PAINLEVEL_OUTOF10: 10

## 2019-04-23 RX ORDER — LAMOTRIGINE 25 MG/1
TABLET ORAL
Qty: 60 TABLET | Refills: 0 | Status: SHIPPED | OUTPATIENT
Start: 2019-04-23 | End: 2019-06-05 | Stop reason: SDUPTHER

## 2019-04-23 NOTE — ED PROVIDER NOTES
Department of Emergency Medicine  [address] [place] [address]  Provider Note  Admit Date/Time: 4/15/2019  7:33 PM  Room: 04/04  MRN: [id number]  Chief Complaint: Knee Injury (Pt c/o falling 5 days ago and injured her L knee)       History of Present Illness   Source of history provided by:  patient. History/Exam Limitations: none. Shilpa Lara is a 34 y.o. female who has a past medical history of:   Past Medical History:   Diagnosis Date    Development delay     DiGeorge syndrome ([address]     Fine motor delay     Obesity     Velocardiofacial syndrome     presents to the urgent care center by private car for valuation of left knee pain she fell on it 5 days ago and is still complaining of pain. She is here with her mother for evaluation since it hasn't gotten better they decided it needed to come in to get an x-ray. Not complaining of pain anywhere else or were no other injuries. ROS    Pertinent positives and negatives are stated within HPI, all other systems reviewed and are negative. Past Surgical History:   Procedure Laterality Date    CARDIAC SURGERY     Social History:  reports that she has never smoked. She has never used smokeless tobacco. She reports that she does not drink alcohol or use drugs. Family History: family history includes Diabetes in her mother. Allergies: Aspirin    Physical Exam   Oxygen Saturation Interpretation: Normal.   ED Triage Vitals [04/15/19 1935]   BP Temp Temp Source Pulse Resp SpO2 Height Weight   115/68 98.4 °F (36.9 °C) Oral 89 20 98 % -- 230 lb (104.3 kg)       Physical Exam  · Constitutional/General: Alert and oriented x3, well appearing, non toxic in NAD  · HEENT:  NC/NT. PERRLA,  Airway patent. · Neck: Supple, full ROM, non tender to palpation in the midline, no stridor, no crepitus, no meningeal signs  · Respiratory: Lungs clear to auscultation bilaterally, no wheezes, rales, or rhonchi.  Not in respiratory distress  · CV:  Regular rate. Regular rhythm. No murmurs, gallops, or rubs. 2+ distal pulses  · Chest: No chest wall tenderness  · GI:  Abdomen Soft, Non tender, Non distended. +BS. No rebound, guarding, or rigidity. No pulsatile masses. · Musculoskeletal: Moves all extremities x 4. Warm and well perfused, no clubbing, cyanosis, or edema. Capillary refill <3 seconds, she ambulates without difficulty there are no abrasions,  Ecchymosis,  or edema or abnormal warmth noted over the left knee, there is no erythema. · Integument: skin warm and dry. No rashes. · Lymphatic: no lymphadenopathy noted  · Neurologic: GCS 15, no focal deficits, symmetric strength 5/5 in the upper and lower extremities bilaterally  · Psychiatric: Normal Affect    Lab / Imaging Results   (All laboratory and radiology results have been personally reviewed by myself)  Labs:  No results found for this visit on 04/15/19. Imaging: All Radiology results interpreted by Radiologist unless otherwise noted. XR KNEE LEFT (1-2 VIEWS)   Final Result   No acute osseous abnormality. ED Course / Medical Decision Making   Medications - No data to display       Consult(s):   None    Procedure(s):   none    MDM:   Patient  fell on her left knee 5 days ago and still having pain I did obtain an x-ray. The exam is normal, there is no erythema or abnormal warmth there's no ecchymosis or abrasions noted. X-ray was negative. I just advised symptomatic treatment with Tylenol or ibuprofen and follow-up with the PCP. Counseling:    I have  spoken with the patient and discussed todays results, in addition to providing specific details for the plan of care and counseling regarding the diagnosis and prognosis. Questions are answered at this time and they are agreeable with the plan. Assessment      1.  Contusion of left knee, initial encounter      Plan   Discharge to home and advised to contact Addy Marcos DO  [address] [place] [address]

## 2019-05-03 ENCOUNTER — OFFICE VISIT (OUTPATIENT)
Dept: FAMILY MEDICINE CLINIC | Age: 29
End: 2019-05-03
Payer: MEDICARE

## 2019-05-03 VITALS
SYSTOLIC BLOOD PRESSURE: 136 MMHG | WEIGHT: 272.2 LBS | TEMPERATURE: 97.9 F | DIASTOLIC BLOOD PRESSURE: 82 MMHG | RESPIRATION RATE: 16 BRPM | HEART RATE: 91 BPM | HEIGHT: 65 IN | OXYGEN SATURATION: 96 % | BODY MASS INDEX: 45.35 KG/M2

## 2019-05-03 DIAGNOSIS — M25.50 ARTHRALGIA, UNSPECIFIED JOINT: Primary | ICD-10-CM

## 2019-05-03 PROCEDURE — 99213 OFFICE O/P EST LOW 20 MIN: CPT | Performed by: NURSE PRACTITIONER

## 2019-05-03 RX ORDER — CALCITRIOL 0.5 UG/1
0.5 CAPSULE, LIQUID FILLED ORAL 2 TIMES DAILY
Qty: 180 CAPSULE | Refills: 3 | Status: SHIPPED | OUTPATIENT
Start: 2019-05-03 | End: 2019-06-25 | Stop reason: SDUPTHER

## 2019-05-03 ASSESSMENT — PATIENT HEALTH QUESTIONNAIRE - PHQ9
1. LITTLE INTEREST OR PLEASURE IN DOING THINGS: 0
SUM OF ALL RESPONSES TO PHQ9 QUESTIONS 1 & 2: 2
SUM OF ALL RESPONSES TO PHQ QUESTIONS 1-9: 2
SUM OF ALL RESPONSES TO PHQ QUESTIONS 1-9: 2
2. FEELING DOWN, DEPRESSED OR HOPELESS: 2

## 2019-05-03 ASSESSMENT — ENCOUNTER SYMPTOMS
SHORTNESS OF BREATH: 1
WHEEZING: 0
CONSTIPATION: 1
DIARRHEA: 1
NAUSEA: 0
COUGH: 0
VOMITING: 0

## 2019-05-03 NOTE — PROGRESS NOTES
Yes TWIN Zuniga CNP   clobetasol (TEMOVATE) 0.05 % cream Apply topically 2 times daily Apply topically 2 times daily. Yes TWIN Zuniga CNP   Ciclopirox 1 % SHAM USE EVERY OTHER DAY AS A SHAMPOO Yes TWIN Zuniga CNP   Clobetasol Propionate Emulsion 0.05 % FOAM APPLY TO RASH ON SCALP TWICE A DAY AS NEEDED Yes Historical Provider, MD         Allergies   Allergen Reactions    Aspirin          Review of Systems  Review of Systems   Constitutional: Positive for unexpected weight change (gain). Negative for activity change and appetite change. Respiratory: Positive for shortness of breath (CHINO). Negative for cough and wheezing. Cardiovascular: Positive for leg swelling. Negative for chest pain and palpitations. Gastrointestinal: Positive for constipation and diarrhea. Negative for nausea and vomiting. Musculoskeletal: Positive for arthralgias. Neurological: Positive for headaches (chronic). Negative for light-headedness. Psychiatric/Behavioral: Positive for sleep disturbance. VS:  /82   Pulse 91   Temp 97.9 °F (36.6 °C) (Oral)   Resp 16   Ht 5' 5\" (1.651 m)   Wt 272 lb 3.2 oz (123.5 kg)   SpO2 96%   BMI 45.30 kg/m²     Physical Exam  Physical Exam   Constitutional: She is oriented to person, place, and time. She appears well-developed and well-nourished. No distress. HENT:   Head: Normocephalic and atraumatic. Neck: No tracheal deviation present. No thyromegaly present. Cardiovascular: Normal rate, regular rhythm, normal heart sounds and intact distal pulses. No murmur heard. Pulmonary/Chest: Effort normal and breath sounds normal. She has no wheezes. She has no rales. She exhibits no tenderness. Abdominal: Soft. Bowel sounds are normal. There is no tenderness. Musculoskeletal: She exhibits tenderness (tenderness to bilateral first MCP). Lymphadenopathy:     She has no cervical adenopathy.    Neurological: She is alert and oriented to person,

## 2019-05-03 NOTE — LETTER
[facility]  [address]  Phone: [phone]  Fax: [phone]    TWIN Huitron CNP        May 3, 2019     Patient: Guy Tabares   YOB: 1990   Date of Visit: 5/3/2019       To Whom It May Concern: It is my medical opinion that [facility] should be allowed to take her albuterol inhaler, qvar inhaler, and calcium carbonate while at work. .    If you have any questions or concerns, please don't hesitate to call.     Sincerely,        TWIN Huitron CNP

## 2019-05-31 ENCOUNTER — OFFICE VISIT (OUTPATIENT)
Dept: FAMILY MEDICINE CLINIC | Age: 29
End: 2019-05-31
Payer: MEDICARE

## 2019-05-31 VITALS
WEIGHT: 263 LBS | OXYGEN SATURATION: 95 % | DIASTOLIC BLOOD PRESSURE: 62 MMHG | HEIGHT: 65 IN | BODY MASS INDEX: 43.82 KG/M2 | HEART RATE: 102 BPM | TEMPERATURE: 97.8 F | RESPIRATION RATE: 18 BRPM | SYSTOLIC BLOOD PRESSURE: 112 MMHG

## 2019-05-31 DIAGNOSIS — J01.90 ACUTE SINUSITIS, RECURRENCE NOT SPECIFIED, UNSPECIFIED LOCATION: ICD-10-CM

## 2019-05-31 DIAGNOSIS — J02.9 SORE THROAT: Primary | ICD-10-CM

## 2019-05-31 LAB — S PYO AG THROAT QL: NORMAL

## 2019-05-31 PROCEDURE — 99213 OFFICE O/P EST LOW 20 MIN: CPT | Performed by: NURSE PRACTITIONER

## 2019-05-31 PROCEDURE — 87880 STREP A ASSAY W/OPTIC: CPT | Performed by: NURSE PRACTITIONER

## 2019-05-31 RX ORDER — AMOXICILLIN 500 MG/1
500 CAPSULE ORAL 3 TIMES DAILY
Qty: 21 CAPSULE | Refills: 0 | Status: SHIPPED | OUTPATIENT
Start: 2019-05-31 | End: 2019-06-07

## 2019-05-31 ASSESSMENT — ENCOUNTER SYMPTOMS
VOMITING: 0
WHEEZING: 1
DIARRHEA: 1
COUGH: 1
CONSTIPATION: 0
NAUSEA: 0
SHORTNESS OF BREATH: 0
SORE THROAT: 1

## 2019-05-31 NOTE — LETTER
----- Message from Loni Aponte sent at 7/11/2018  1:42 PM CDT -----  Contact: Pt  Pt calling regarding her injection medication        Pt call back number [phone]    Rx request for Vitamin B12 sent to Dr Hodges.     [facility]  [address]  Phone: [phone]  Fax: [phone]    TWIN Lewis CNP        May 31, 2019     Patient: Shawna Mom   YOB: 1990   Date of Visit: 5/31/2019       To Whom it May Concern:    Eve Patton was seen in my clinic on 5/31/2019. She may return to work on 6/3/2019. If you have any questions or concerns, please don't hesitate to call.     Sincerely,         TWIN Lewis CNP

## 2019-05-31 NOTE — PROGRESS NOTES
HPI:  Patient comes intoday for   Chief Complaint   Patient presents with    URI     hot, sore throat, sweaty, headache, cough, fatigue has been going on since last Monday   . Onset 5 to 6 days ago and is not resolving. She has been taking mucinex with no relief. Complains of sore throat, fever, headache, cough, sweats, and diarrhea. Prior to Visit Medications    Medication Sig Taking? Authorizing Provider   amoxicillin (AMOXIL) 500 MG capsule Take 1 capsule by mouth 3 times daily for 7 days Yes TWIN Rocha CNP   calcitRIOL (ROCALTROL) 0.5 MCG capsule Take 1 capsule by mouth 2 times daily Yes TWIN Rocha CNP   lamoTRIgine (LAMICTAL) 25 MG tablet TAKE ONE TABLET BY MOUTH TWO TIMES A DAY Yes TWIN Rocha CNP   Guselkumab (TREMFYA SC) Inject into the skin Yes Historical Provider, MD   busPIRone (BUSPAR) 5 MG tablet TAKE ONE TABLET BY MOUTH TWO TIMES A DAY Yes TWIN Weiss CNP   sertraline (ZOLOFT) 100 MG tablet TAKE 1 & 1/2 TABLETS BY MOUTH EVERY DAY Yes Prince Relic, DO   calcium carbonate (CALCIUM 600) 600 MG TABS tablet Take 2 tablet three times a day Yes Prince Relic, DO   ibuprofen (ADVIL;MOTRIN) 800 MG tablet TAKE ONE TABLET BY MOUTH EVERY 8 HOURS AS NEEDED FOR PAIN.  Yes Prince Relic, DO   cetirizine (ZYRTEC) 10 MG tablet TAKE ONE TABLET BY MOUTH DAILY Yes TWIN Sampson CNP   ranitidine (ZANTAC) 300 MG tablet Take 1 tablet by mouth nightly  Patient taking differently: Take 300 mg by mouth 2 times daily  Yes Guicho Denny,    levETIRAcetam (KEPPRA) 750 MG tablet Take 1 tablet by mouth 2 times daily Yes Gini Peabody, APRN - CNS   albuterol sulfate  (90 Base) MCG/ACT inhaler Inhale 2 puffs into the lungs every 6 hours as needed for Wheezing Yes TWIN Rocha CNP   beclomethasone (QVAR) 40 MCG/ACT inhaler Inhale 2 puffs into the lungs 2 times daily Rinse mouth after use Yes TWIN Rocha CNP clobetasol (TEMOVATE) 0.05 % cream Apply topically 2 times daily Apply topically 2 times daily. Yes TWIN Huitron CNP   Ciclopirox 1 % SHAM USE EVERY OTHER DAY AS A SHAMPOO Yes TWIN Huitron CNP   Clobetasol Propionate Emulsion 0.05 % FOAM APPLY TO RASH ON SCALP TWICE A DAY AS NEEDED Yes Historical Provider, MD         Allergies   Allergen Reactions    Aspirin          Review of Systems  Review of Systems   Constitutional: Positive for chills, diaphoresis and fever. HENT: Positive for congestion, ear pain and sore throat. Respiratory: Positive for cough (for past 2 days) and wheezing. Negative for shortness of breath. Cardiovascular: Negative for chest pain and palpitations. Gastrointestinal: Positive for diarrhea. Negative for constipation, nausea and vomiting. Musculoskeletal: Negative for myalgias. Skin: Negative for rash. Neurological: Positive for light-headedness and headaches. Negative for weakness. Psychiatric/Behavioral:        Hearing \"bad voices\", seeing counselor         VS:  /62   Pulse 102   Temp 97.8 °F (36.6 °C) (Oral)   Resp 18   Ht 5' 5\" (1.651 m)   Wt 263 lb (119.3 kg)   SpO2 95%   BMI 43.77 kg/m²     Physical Exam  Physical Exam   Constitutional: She is oriented to person, place, and time. She appears well-developed and well-nourished. No distress. HENT:   Head: Normocephalic and atraumatic. TMs partially obstructed by cerumen, nasal turbinates erythematous and boggy, pharynx difficult to visualize due to body habitus but appears mildly erythematous   Neck: No tracheal deviation present. No thyromegaly present. Cardiovascular: Normal rate, regular rhythm, normal heart sounds and intact distal pulses. No murmur heard. Pulmonary/Chest: Effort normal. She has wheezes. She has no rales. She exhibits no tenderness. Abdominal: Soft. Bowel sounds are normal. There is no tenderness. Lymphadenopathy:     She has no cervical adenopathy. Neurological: She is alert and oriented to person, place, and time. Skin: Skin is warm and dry. Psychiatric: She has a normal mood and affect. Her behavior is normal.         Assessment/Plan:  Watson Burton was seen today for uri. Diagnoses and all orders for this visit:    Sore throat  -     POCT rapid strep A  -     amoxicillin (AMOXIL) 500 MG capsule; Take 1 capsule by mouth 3 times daily for 7 days    Acute sinusitis, recurrence not specified, unspecified location  -     amoxicillin (AMOXIL) 500 MG capsule;  Take 1 capsule by mouth 3 times daily for 7 days  -contact office if symptoms do not improve within 48 hours or completely resolve within 2 weeks        Greater than 15  Minutes was spent with patient and more than 50% of the time was spent face to facecounseling and educating regarding diagnoses

## 2019-06-05 DIAGNOSIS — D82.1 DIGEORGE SYNDROME (HCC): ICD-10-CM

## 2019-06-05 DIAGNOSIS — F41.9 ANXIETY AND DEPRESSION: ICD-10-CM

## 2019-06-05 DIAGNOSIS — F32.A ANXIETY AND DEPRESSION: ICD-10-CM

## 2019-06-05 DIAGNOSIS — G40.409 OTHER GENERALIZED EPILEPSY, NOT INTRACTABLE, WITHOUT STATUS EPILEPTICUS (HCC): ICD-10-CM

## 2019-06-05 RX ORDER — SERTRALINE HYDROCHLORIDE 100 MG/1
TABLET, FILM COATED ORAL
Qty: 45 TABLET | Refills: 0 | Status: SHIPPED | OUTPATIENT
Start: 2019-06-05 | End: 2019-07-17 | Stop reason: SDUPTHER

## 2019-06-05 RX ORDER — LAMOTRIGINE 25 MG/1
TABLET ORAL
Qty: 60 TABLET | Refills: 0 | Status: SHIPPED | OUTPATIENT
Start: 2019-06-05 | End: 2019-07-17 | Stop reason: SDUPTHER

## 2019-06-05 RX ORDER — LEVETIRACETAM 750 MG/1
TABLET ORAL
Qty: 60 TABLET | Refills: 2 | Status: SHIPPED | OUTPATIENT
Start: 2019-06-05 | End: 2019-06-13 | Stop reason: SDUPTHER

## 2019-06-10 ENCOUNTER — TELEPHONE (OUTPATIENT)
Dept: NEUROLOGY | Age: 29
End: 2019-06-10

## 2019-06-10 NOTE — TELEPHONE ENCOUNTER
Called to see if patient was able to come 6/11 to be established w/John Bryan, per mom patient has another appointment.   Electronically signed by Christian Heard on 6/10/19 at 10:25 AM

## 2019-06-11 ENCOUNTER — OFFICE VISIT (OUTPATIENT)
Dept: FAMILY MEDICINE CLINIC | Age: 29
End: 2019-06-11
Payer: MEDICARE

## 2019-06-11 VITALS
TEMPERATURE: 98.2 F | BODY MASS INDEX: 44.89 KG/M2 | RESPIRATION RATE: 16 BRPM | OXYGEN SATURATION: 96 % | DIASTOLIC BLOOD PRESSURE: 78 MMHG | HEIGHT: 65 IN | WEIGHT: 269.4 LBS | SYSTOLIC BLOOD PRESSURE: 128 MMHG | HEART RATE: 111 BPM

## 2019-06-11 DIAGNOSIS — Z00.00 ROUTINE GENERAL MEDICAL EXAMINATION AT A HEALTH CARE FACILITY: Primary | ICD-10-CM

## 2019-06-11 PROCEDURE — G0438 PPPS, INITIAL VISIT: HCPCS | Performed by: NURSE PRACTITIONER

## 2019-06-11 RX ORDER — RANITIDINE 300 MG/1
300 TABLET ORAL NIGHTLY
Qty: 30 TABLET | Refills: 3 | Status: SHIPPED | OUTPATIENT
Start: 2019-06-11 | End: 2019-10-15 | Stop reason: SDUPTHER

## 2019-06-11 ASSESSMENT — PATIENT HEALTH QUESTIONNAIRE - PHQ9
SUM OF ALL RESPONSES TO PHQ QUESTIONS 1-9: 2
SUM OF ALL RESPONSES TO PHQ QUESTIONS 1-9: 2

## 2019-06-11 ASSESSMENT — LIFESTYLE VARIABLES: HOW OFTEN DO YOU HAVE A DRINK CONTAINING ALCOHOL: 0

## 2019-06-11 ASSESSMENT — ANXIETY QUESTIONNAIRES: GAD7 TOTAL SCORE: 2

## 2019-06-11 NOTE — PROGRESS NOTES
Medicare Annual Wellness Visit  Name: Rosalia Ibarra Date: 2019   MRN: [id number] Sex: Female   Age: 34 y.o. Ethnicity: Non-/Non    : 1990 Race: [address] Keene is here for Medicare AWV    Screenings for behavioral, psychosocial and functional/safety risks, and cognitive dysfunction are all negative except as indicated below. These results, as well as other patient data from the [address] form, are documented in Flowsheets linked to this Encounter. Allergies   Allergen Reactions    Aspirin      Prior to Visit Medications    Medication Sig Taking? Authorizing Provider   sertraline (ZOLOFT) 100 MG tablet TAKE 1 & 1/2 TABLETS BY MOUTH EVERY DAY Yes Troy Lomeli DO   lamoTRIgine (LAMICTAL) 25 MG tablet TAKE ONE TABLET BY MOUTH TWO TIMES A DAY Yes Troy Lomeli DO   levETIRAcetam (KEPPRA) 750 MG tablet TAKE ONE TABLET BY MOUTH TWO TIMES A DAY Yes TWIN Salmon CNP   calcitRIOL (ROCALTROL) 0.5 MCG capsule Take 1 capsule by mouth 2 times daily Yes TWIN Strong CNP   Guselkumab (TREMFYA SC) Inject into the skin Yes Historical Provider, MD   busPIRone (BUSPAR) 5 MG tablet TAKE ONE TABLET BY MOUTH TWO TIMES A DAY Yes TWIN Weiss CNP   calcium carbonate (CALCIUM 600) 600 MG TABS tablet Take 2 tablet three times a day Yes Troy Lomeli DO   ibuprofen (ADVIL;MOTRIN) 800 MG tablet TAKE ONE TABLET BY MOUTH EVERY 8 HOURS AS NEEDED FOR PAIN.  Yes Troy Lomeli DO   cetirizine (ZYRTEC) 10 MG tablet TAKE ONE TABLET BY MOUTH DAILY Yes TWIN Weiss CNP   ranitidine (ZANTAC) 300 MG tablet Take 1 tablet by mouth nightly  Patient taking differently: Take 300 mg by mouth 2 times daily  Yes Guicho Denny DO   albuterol sulfate  (90 Base) MCG/ACT inhaler Inhale 2 puffs into the lungs every 6 hours as needed for Wheezing Yes TWIN Strong CNP   beclomethasone (QVAR) 40 MCG/ACT inhaler Inhale 2 puffs into the lungs 2 times daily Rinse mouth after use Yes TWIN Pace CNP   Ciclopirox 1 % SHAM USE EVERY OTHER DAY AS A SHAMPOO Yes TWIN Pace CNP   Clobetasol Propionate Emulsion 0.05 % FOAM APPLY TO RASH ON SCALP TWICE A DAY AS NEEDED Yes Historical Provider, MD   clobetasol (TEMOVATE) 0.05 % cream Apply topically 2 times daily Apply topically 2 times daily. TWIN Pace CNP     Past Medical History:   Diagnosis Date    Development delay     DiGeorge syndrome ([address]     Fine motor delay     Obesity     Velocardiofacial syndrome      Past Surgical History:   Procedure Laterality Date    CARDIAC SURGERY       Family History   Problem Relation Age of Onset    Diabetes Mother        CareTeam (Including outside providers/suppliers regularly involved in providing care):   Patient Care Team:  Lizet Hogue DO as PCP - General  Lizet Hogue DO as PCP - [facility] Empaneled Provider    Wt Readings from Last 3 Encounters:   06/11/19 269 lb 6.4 oz (122.2 kg)   05/31/19 263 lb (119.3 kg)   05/03/19 272 lb 3.2 oz (123.5 kg)     Vitals:    06/11/19 1306   BP: 128/78   Pulse: 111   Resp: 16   Temp: 98.2 °F (36.8 °C)   TempSrc: Oral   SpO2: 96%   Weight: 269 lb 6.4 oz (122.2 kg)   Height: 5' 5\" (1.651 m)     Body mass index is 44.83 kg/m². Based upon direct observation of the patient, evaluation of cognition reveals recent and remote memory intact.     General Appearance: alert and oriented to person, place and time, well developed and well- nourished, in no acute distress  Skin: warm and dry, erythematous plaques along hairline  Head: normocephalic and atraumatic  Eyes: pupils equal, round, and reactive to light, extraocular eye movements intact, conjunctivae normal  ENT: tympanic membrane, external ear and ear canal normal bilaterally, nose without deformity, nasal mucosa and turbinates normal without polyps, tongue is very thick so visualization of pharynx is limited  Neck: supple and non-tender without mass, no thyromegaly or thyroid nodules, no cervical lymphadenopathy, neck is very thick and short  Pulmonary/Chest: clear to auscultation bilaterally- no wheezes, rales or rhonchi, normal air movement, no respiratory distress  Cardiovascular: normal rate, regular rhythm, normal S1 and S2, no murmurs, rubs, clicks, or gallops, distal pulses intact, no carotid bruits  Abdomen: soft, minimal diffuse tenderness, non-distended, normal bowel sounds, no masses or organomegaly  Extremities: no cyanosis, clubbing or pitting edema  Musculoskeletal: normal range of motion, no joint swelling, deformity or tenderness  Neurologic: wide based gait, coordination and speech normal    Patient's complete Health Risk Assessment and screening values have been reviewed and are found in Flowsheets. The following problems were reviewed today and where indicated follow up appointments were made and/or referrals ordered. Positive Risk Factor Screenings with Interventions:     Fall Risk:  Timed Up and Go Test > 12 seconds?: no  2 or more falls in past year?: (!) yes  Fall with injury in past year?: (!) yes  Fall Risk Assessment[de-identified] (!) Positive Screening for Falls  Fall Risk Interventions:    · Home safety tips provided    General Health:  General  In general, how would you say your health is?: Good  In the past 7 days, have you experienced any of the following?  New or Increased Pain, New or Increased Fatigue, Loneliness, Social Isolation, Stress or Anger?: (!) Stress  Do you get the social and emotional support that you need?: Yes  Do you have a Living Will?: (!) No  General Health Risk Interventions:  · Stress: patient declines any further evaluation/treatment for this issue, patient currently in counseling and follows with psychiatrist  · No Living Will: provided the state-specific advance directive document to the patient    Health Habits/Nutrition:  Health Habits/Nutrition  Do you exercise for at least 20 minutes 2-3 times per week?: (!) No  Have you lost any weight without trying in the past 3 months?: No  Do you eat fewer than 2 meals per day?: No  Have you seen a dentist within the past year?: (!) No  Body mass index is 44.83 kg/m². Health Habits/Nutrition Interventions:  · Inadequate physical activity:  patient agrees to exercise for at least 150 minutes/week  · Dental exam overdue:  patient encouraged to make appointment with his/her dentist    ADL:  ADLs  In the past 7 days, did you need help from others to perform any of the following everyday activities? Eating, dressing, grooming, bathing, toileting, or walking/balance?: (!) Dressing, Grooming, Bathing, Walking/Balance  In the past 7 days, did you need help from others to take care of any of the following? Laundry, housekeeping, banking/finances, shopping, telephone use, food preparation, transportation, or taking medications?: [employer], Housekeeping, [employer], Shopping, Food Preparation, Transportation, Taking Medications  ADL Interventions:  · Patient declines any further evaluation/treatment for this issue  · mother is caregiver    Personalized Preventive Plan   Current Health Maintenance Status    There is no immunization history on file for this patient. Health Maintenance   Topic Date Due    Pneumococcal 0-64 years Vaccine (1 of 1 - PPSV23) 01/02/1998    Varicella Vaccine (1 of 2 - 13+ 2-dose series) 01/02/2005    HIV screen  01/02/2007    DTaP/Tdap/Td vaccine (1 - Tdap) 01/02/2011    Cervical cancer screen  01/02/2013    A1C test (Diabetic or Prediabetic)  03/10/2018    Flu vaccine (Season Ended) 09/01/2019    HPV vaccine  Aged Out     Recommendations for [employer] Due: see orders and patient instructions/AVS.  . Recommended screening schedule for the next 5-10 years is provided to the patient in written form: see Patient Haven Rater was seen today for medicare aw.     Diagnoses and

## 2019-06-11 NOTE — PATIENT INSTRUCTIONS
Advance Directives: Care Instructions  Your Care Instructions  An advance directive is a legal way to state your wishes at the end of your life. It tells your family and your doctor what to do if you can no longer say what you want. There are two main types of advance directives. You can change them any time that your wishes change. · A living will tells your family and your doctor your wishes about life support and other treatment. · A durable power of  for health care lets you name a person to make treatment decisions for you when you can't speak for yourself. This person is called a health care agent. If you do not have an advance directive, decisions about your medical care may be made by a doctor or a  who doesn't know you. It may help to think of an advance directive as a gift to the people who care for you. If you have one, they won't have to make tough decisions by themselves. Follow-up care is a key part of your treatment and safety. Be sure to make and go to all appointments, and call your doctor if you are having problems. It's also a good idea to know your test results and keep a list of the medicines you take. How can you care for yourself at home? · Discuss your wishes with your loved ones and your doctor. This way, there are no surprises. · Many states have a unique form. Or you might use a universal form that has been approved by many states. This kind of form can sometimes be completed and stored online. Your electronic copy will then be available wherever you have a connection to the Internet. In most cases, doctors will respect your wishes even if you have a form from a different state. · You don't need a  to do an advance directive. But you may want to get legal advice. · Think about these questions when you prepare an advance directive:  ? Who do you want to make decisions about your medical care if you are not able to?  Many people choose a family member or close friend. ? Do you know enough about life support methods that might be used? If not, talk to your doctor so you understand. ? What are you most afraid of that might happen? You might be afraid of having pain, losing your independence, or being kept alive by machines. ? Where would you prefer to die? Choices include your home, a hospital, or a nursing home. ? Would you like to have information about hospice care to support you and your family? ? Do you want to donate organs when you die? ? Do you want certain Baptist practices performed before you die? If so, put your wishes in the advance directive. · Read your advance directive every year, and make changes as needed. When should you call for help? Be sure to contact your doctor if you have any questions. Where can you learn more? Go to [email or web address]://chemmaeb.[email or web address] org and sign in to your [email or web address] account. Enter R264 in the Ellevation box to learn more about \"Advance Directives: Care Instructions. \"     If you do not have an account, please click on the \"Sign Up Now\" link. Current as of: April 18, 2018  Content Version: 12.0  © 5895-8617 Healthwise, Mor.sl. Care instructions adapted under license by [facility] ([facility]). If you have questions about a medical condition or this instruction, always ask your healthcare professional. [address] any warranty or liability for your use of this information. Learning About Durable Power of  for Health Care  What is a durable power of  for health care? A durable power of  for health care is one type of the legal forms called advance directives. It lets you decide who you want to make treatment decisions for you if you cannot speak or decide for yourself. The person you choose is called your health care agent. Another type of advance directive is a living will.  It lets you write down what kinds of treatment or life support you want or do not want. What should you think about when choosing a health care agent? Choose your health care agent carefully. This person may or may not be a family member. Talk to the person before you make your final decision. Make sure he or she is comfortable with this responsibility. It's a good idea to choose someone who:  · Is at least 25years old. · Knows you well and understands what makes life meaningful for you. · Understands your Sikhism and moral values. · Will do what you want, not what he or she wants. · Will be able to make difficult choices at a stressful time. · Will be able to refuse or stop treatment, if that is what you would want, even if you could die. · Will be firm and confident with health professionals if needed. · Will ask questions to get necessary information. · Lives near you or agrees to travel to you if needed. Your family may help you make medical decisions while you can still be part of that process. But it is important to choose one person to be your health care agent in case you are not able to make decisions for yourself. If you don't fill out the legal form and name a health care agent, the decisions your family can make may be limited. Who will make decisions for you if you do not have a health care agent? If you don't have a health care agent or a living will, your family members may disagree about your medical care. And then some medical professionals who may not know you as well might have to make decisions for you. In some cases, a  makes the decisions. When you name a health care agent, it is very clear who has the power to make health decisions for you. How do you name a health care agent? You name your health care agent on a legal form. It is usually called a durable power of  for health care. Ask your hospital, state bar association, or office on aging where to find these forms.   You must sign the form to make it legal. Some states require you to get the form notarized. This means that a person called a  watches you sign the form and then he or she signs the form. Some states also require that two or more witnesses sign the form. Be sure to tell your family members and doctors who your health care agent is. Keep your forms in a safe place. But make sure that your loved ones know where the forms are. This could be in your desk where you keep other important papers. Make sure your doctor has a copy of your forms. Where can you learn more? Go to https://chpepiceweb.[email or web address]. org and sign in to your Dynamic Energy account. Enter [id number] in the Venuemob box to learn more about \"Learning About Durable Power of  for Health Care. \"     If you do not have an account, please click on the \"Sign Up Now\" link. Current as of: April 18, 2018  Content Version: 12.0  © 9881-0483 EPV SOLAR. Care instructions adapted under license by [facility] ([facility]). If you have questions about a medical condition or this instruction, always ask your healthcare professional. [place] [address] any warranty or liability for your use of this information. Learning About Living Marylee Bushman  What is a living will? A living will is a legal form you use to write down the kind of care you want at the end of your life. It is used by the health professionals who will treat you if you aren't able to decide for yourself. If you put your wishes in writing, your loved ones and others will know what kind of care you want. They won't need to guess. This can ease your mind and be helpful to others. A living will is not the same as an estate or property will. An estate will explains what you want to happen with your money and property after you die. Is a living will a legal document? A living will is a legal document. Each state has its own laws about living grant.  If you move to another state, make sure that your living will is legal in the state where you now live. Or you might use a universal form that has been approved by many states. This kind of form can sometimes be completed and stored online. Your electronic copy will then be available wherever you have a connection to the Internet. In most cases, doctors will respect your wishes even if you have a form from a different state. · You don't need an  to complete a living will. But legal advice can be helpful if your state's laws are unclear, your health history is complicated, or your family can't agree on what should be in your living will. · You can change your living will at any time. Some people find that their wishes about end-of-life care change as their health changes. · In addition to making a living will, think about completing a medical power of  form. This form lets you name the person you want to make end-of-life treatment decisions for you (your \"health care agent\") if you're not able to. Many hospitals and nursing homes will give you the forms you need to complete a living will and a medical power of . · Your living will is used only if you can't make or communicate decisions for yourself anymore. If you become able to make decisions again, you can accept or refuse any treatment, no matter what you wrote in your living will. · Your state may offer an online registry. This is a place where you can store your living will online so the doctors and nurses who need to treat you can find it right away. What should you think about when creating a living will? Talk about your end-of-life wishes with your family members and your doctor. Let them know what you want. That way the people making decisions for you won't be surprised by your choices. Think about these questions as you make your living will:  · Do you know enough about life support methods that might be used?  If not, talk to your doctor so you know what might be done if you can't breathe on your own, your heart stops, or you're unable to swallow. · What things would you still want to be able to do after you receive life-support methods? Would you want to be able to walk? To speak? To eat on your own? To live without the help of machines? · If you have a choice, where do you want to be cared for? In your home? At a hospital or nursing home? · Do you want certain Jewish practices performed if you become very ill? · If you have a choice at the end of your life, where would you prefer to die? At home? In a hospital or nursing home? Somewhere else? · Would you prefer to be buried or cremated? · Do you want your organs to be donated after you die? What should you do with your living will? · Make sure that your family members and your health care agent have copies of your living will. · Give your doctor a copy of your living will to keep in your medical record. If you have more than one doctor, make sure that each one has a copy. · You may want to put a copy of your living will where it can be easily found. Where can you learn more? Go to [email or web address]://[email or web address] [email or web address].[email or web address] org and sign in to your "deets[employer] Inc." account. Enter U180 in the Projectioneering box to learn more about \"Learning About Living [place]. \"     If you do not have an account, please click on the \"Sign Up Now\" link. Current as of: April 18, 2018  Content Version: 12.0  © 0621-9217 Healthwise, Incorporated. Care instructions adapted under license by [facility] ([facility]). If you have questions about a medical condition or this instruction, always ask your healthcare professional. [place] [address] any warranty or liability for your use of this information. Personalized Preventive Plan for Kiarra Ramyond - 6/11/2019  Medicare offers a range of preventive health benefits. Some of the tests and screenings are paid in full while other may be subject to a deductible, co-insurance, and/or copay.     Some of these benefits include a comprehensive review of your medical history including lifestyle, illnesses that may run in your family, and various assessments and screenings as appropriate. After reviewing your medical record and screening and assessments performed today your provider may have ordered immunizations, labs, imaging, and/or referrals for you. A list of these orders (if applicable) as well as your Preventive Care list are included within your After Visit Summary for your review. Other Preventive Recommendations:    · A preventive eye exam performed by an eye specialist is recommended every 1-2 years to screen for glaucoma; cataracts, macular degeneration, and other eye disorders. · A preventive dental visit is recommended every 6 months. · Try to get at least 150 minutes of exercise per week or 10,000 steps per day on a pedometer . · Order or download the FREE \"Exercise & Physical Activity: Your Everyday Guide\" from [employer]aki[employer]. Call 3-805.266.5227 or search [employer] online. · You need 3979-0104 mg of calcium and 7275-9755 IU of vitamin D per day. It is possible to meet your calcium requirement with diet alone, but a vitamin D supplement is usually necessary to meet this goal.  · When exposed to the sun, use a sunscreen that protects against both UVA and UVB radiation with an SPF of 30 or greater. Reapply every 2 to 3 hours or after sweating, drying off with a towel, or swimming. · Always wear a seat belt when traveling in a car. Always wear a helmet when riding a bicycle or motorcycle.

## 2019-06-13 ENCOUNTER — OFFICE VISIT (OUTPATIENT)
Dept: NEUROLOGY | Age: 29
End: 2019-06-13
Payer: MEDICARE

## 2019-06-13 VITALS
HEART RATE: 92 BPM | OXYGEN SATURATION: 93 % | BODY MASS INDEX: 44.65 KG/M2 | WEIGHT: 268 LBS | DIASTOLIC BLOOD PRESSURE: 86 MMHG | RESPIRATION RATE: 16 BRPM | SYSTOLIC BLOOD PRESSURE: 138 MMHG | HEIGHT: 65 IN

## 2019-06-13 DIAGNOSIS — G40.409 OTHER GENERALIZED EPILEPSY, NOT INTRACTABLE, WITHOUT STATUS EPILEPTICUS (HCC): ICD-10-CM

## 2019-06-13 DIAGNOSIS — D82.1 DIGEORGE SYNDROME (HCC): Primary | ICD-10-CM

## 2019-06-13 PROCEDURE — 99215 OFFICE O/P EST HI 40 MIN: CPT | Performed by: CLINICAL NURSE SPECIALIST

## 2019-06-13 RX ORDER — LEVETIRACETAM 750 MG/1
750 TABLET ORAL 2 TIMES DAILY
Qty: 60 TABLET | Refills: 5 | Status: SHIPPED | OUTPATIENT
Start: 2019-06-13 | End: 2020-01-23 | Stop reason: SDUPTHER

## 2019-06-13 NOTE — PROGRESS NOTES
Chris Rowland is a 34 y.o. right handed female     This is a 34 y.o. woman who presents today for further evaluation and treatment of seizures and tremor. The pt has a hx of DiGeorge syndrome with associated developmental delay and is with her mother today - who is an excellent historian. She reports seizures since birth - telling me she \"wasn't able to even hold her for hours because she wouldn't stop seizing\". She describes GTC events- her last one being April of 2017. At that time she was started on Keppra 500 mg BID. Continues on this medication without ill effects or further seizures. Last year her mother was still reporting \"stiffening spells\"   Cathimonique Winn increased her Keppra to 750mg BID and these stopped   No continued spells    EEG after increase was unrevealing     She is not sleeping well--- reportedly getting 5-6 hours per night. recent sleep study was negative for JANELLE     She tells me this is due to her multiple joint pains - which her mom agrees she is possibly embellishing in order to get out of exercising. Discussed PT   Feet issues as well -- does not wear her custom orthotics -- states they do not fit well and hurt    Wearing sandals today     Graduated from [employer] and attended [employer] for 4 years. She now attends [employer]. Lab work scheduled -- will also review and add studies to be drawn     Medically, she is otherwise stable. No chest pain or palpitations  No SOB  No vertigo, lightheadedness or loss of consciousness  No falls, tripping or stumbling  No incontinence of bowels or bladder  No itching or bruising appreciated  No numbness, tingling or focal arm/leg weakness    ROS otherwise negative     Prior to Visit Medications    Medication Sig Taking?  Authorizing Provider   levETIRAcetam (KEPPRA) 750 MG tablet Take 1 tablet by mouth 2 times daily Yes TWIN Penny - CNS   ranitidine (ZANTAC) 300 MG tablet Take 1 tablet by mouth nightly Yes bruit or JVD; large neck circumference    Lungs: clear to auscultation bilaterally, respirations unlabored   Heart: regular rate and rhythm, S1 and S2 normal, distant heart sounds; no murmur, rub or gallop   Extremities: no cyanosis or edema  Pulses: 1+ and symmetric all extremities -- hammer toes bilaterally   Skin: no rashes or lesions     Mental Status: alert; oriented to self and place; pleasant and cooperative    Speech: hypernasal  speech  Language: intact - without aphasias     Cranial Nerves:  I: smell    II: visual acuity     II: visual fields Full   II: pupils JOAN   III,VII: ptosis None   III,IV,VI: extraocular muscles  EOMI without nystagmus    V: mastication Normal   V: facial light touch sensation  Normal   V,VII: corneal reflex  Present   VII: facial muscle function - upper     VII: facial muscle function - lower Normal   VIII: hearing Normal   IX: soft palate elevation  Normal   IX,X: gag reflex Present   XI: trapezius strength  5/5   XI: sternocleidomastoid strength 5/5   XI: neck extension strength  5/5   XII: tongue strength  Normal     Motor  5/5 throughout  Slightly decreased tone throughout  Overweight bulk    Sensory:  LT and PP normal  Vibration minimally decreased in ankles     Coordination:   FN, FFM and EUGENIO symmetrical     Gait:  Slow and slightly unsteady  Walks on toes mainly     DTR:   BE throughout  No Celis's  No Babinski's  Bilateral grasp reflexes present     Laboratory/Radiology:     CBC with Differential:    Lab Results   Component Value Date    WBC 9.9 08/15/2018    RBC 4.55 08/15/2018    HGB 13.2 08/15/2018    HCT 41.2 08/15/2018     08/15/2018    MCV 90.5 08/15/2018    MCH 29.0 08/15/2018    MCHC 32.0 08/15/2018    RDW 13.8 08/15/2018    LYMPHOPCT 19.3 08/15/2018    MONOPCT 5.9 08/15/2018    BASOPCT 0.4 08/15/2018    MONOSABS 0.58 08/15/2018    LYMPHSABS 1.91 08/15/2018    EOSABS 0.20 08/15/2018    BASOSABS 0.04 08/15/2018     CMP:    Lab Results   Component Value Date  11/01/2018    K 3.9 11/01/2018    CL 97 11/01/2018    CO2 30 11/01/2018    BUN 13 11/01/2018    CREATININE 0.6 11/01/2018    GFRAA >60 11/01/2018    LABGLOM >60 11/01/2018    GLUCOSE 132 11/01/2018    PROT 8.0 11/01/2018    LABALBU 4.1 11/01/2018    CALCIUM 6.9 11/01/2018    BILITOT 0.2 11/01/2018    ALKPHOS 41 11/01/2018    AST 27 11/01/2018    ALT 22 11/01/2018     TSH:    Lab Results   Component Value Date    TSH 2.360 03/09/2017     Heads CT 3/2016: No acute intracranial hemorrhage or midline shift. Left frontal scalp hematoma present without calvarial fracture. [s/p fall] Symmetric bilateral basal ganglia calcifications are present. The gray-white matter junctions are intact. No space-occupying intra-axial masses. 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 No opacification of the mastoid sinuses and middle ears. EEG - June 2018  Normal     Imaging, diagnostic tests and labs (from 2018) personally reviewed at the time of this office visit    Assessment: This is a 34 y.o. woman with a hx of DiGeorge syndrome     History of GTC seizures since birth - likely associated with developmental disabilities    Resolved with higher doses of Keppra -- now taking 750mg BID    Developmental disability    Related to DiGeorge syndrome     Medically, she is otherwise well.      Plan:     continue Keppra to 750 mg BID    PT to review and advise    Podiatry to look into replacing her custom orthotics if a candidate for replacement     Labs to be obtained     Encouraged increased exercise and limiting caffeine/pop intake     RTO 4-5 months     Sukhwinder García DNP, APRN  10:00 AM  6/13/2019

## 2019-06-20 ENCOUNTER — HOSPITAL ENCOUNTER (OUTPATIENT)
Age: 29
Discharge: HOME | End: 2019-06-20
Payer: MEDICARE

## 2019-06-20 DIAGNOSIS — G40.409 OTHER GENERALIZED EPILEPSY, NOT INTRACTABLE, WITHOUT STATUS EPILEPTICUS (HCC): ICD-10-CM

## 2019-06-20 LAB
ALBUMIN SERPL-MCNC: 4.2 G/DL (ref 3.5–5.2)
ALP BLD-CCNC: 42 U/L (ref 35–104)
ALT SERPL-CCNC: 42 U/L (ref 0–32)
ANION GAP SERPL CALCULATED.3IONS-SCNC: 12 MMOL/L (ref 7–16)
AST SERPL-CCNC: 33 U/L (ref 0–31)
BASOPHILS ABSOLUTE: 0.03 E9/L (ref 0–0.2)
BASOPHILS RELATIVE PERCENT: 0.3 % (ref 0–2)
BILIRUB SERPL-MCNC: 0.3 MG/DL (ref 0–1.2)
BILIRUBIN DIRECT: <0.2 MG/DL (ref 0–0.3)
BILIRUBIN, INDIRECT: ABNORMAL MG/DL (ref 0–1)
BUN BLDV-MCNC: 11 MG/DL (ref 6–20)
CALCIUM SERPL-MCNC: 7.7 MG/DL (ref 8.6–10.2)
CHLORIDE BLD-SCNC: 99 MMOL/L (ref 98–107)
CO2: 31 MMOL/L (ref 22–29)
CREAT SERPL-MCNC: 0.6 MG/DL (ref 0.5–1)
EOSINOPHILS ABSOLUTE: 0.27 E9/L (ref 0.05–0.5)
EOSINOPHILS RELATIVE PERCENT: 2.7 % (ref 0–6)
GFR AFRICAN AMERICAN: >60
GFR NON-AFRICAN AMERICAN: >60 ML/MIN/1.73
GLUCOSE BLD-MCNC: 95 MG/DL (ref 74–99)
HCT VFR BLD CALC: 39.6 % (ref 34–48)
HEMOGLOBIN: 13.1 G/DL (ref 11.5–15.5)
IMMATURE GRANULOCYTES #: 0.04 E9/L
IMMATURE GRANULOCYTES %: 0.4 % (ref 0–5)
LYMPHOCYTES ABSOLUTE: 2.49 E9/L (ref 1.5–4)
LYMPHOCYTES RELATIVE PERCENT: 25.2 % (ref 20–42)
MCH RBC QN AUTO: 29.2 PG (ref 26–35)
MCHC RBC AUTO-ENTMCNC: 33.1 % (ref 32–34.5)
MCV RBC AUTO: 88.2 FL (ref 80–99.9)
MONOCYTES ABSOLUTE: 0.72 E9/L (ref 0.1–0.95)
MONOCYTES RELATIVE PERCENT: 7.3 % (ref 2–12)
NEUTROPHILS ABSOLUTE: 6.33 E9/L (ref 1.8–7.3)
NEUTROPHILS RELATIVE PERCENT: 64.1 % (ref 43–80)
PDW BLD-RTO: 13.5 FL (ref 11.5–15)
PHOSPHORUS: 5 MG/DL (ref 2.5–4.5)
PLATELET # BLD: 134 E9/L (ref 130–450)
PMV BLD AUTO: 12.5 FL (ref 7–12)
POTASSIUM SERPL-SCNC: 4.1 MMOL/L (ref 3.5–5)
RBC # BLD: 4.49 E12/L (ref 3.5–5.5)
SODIUM BLD-SCNC: 142 MMOL/L (ref 132–146)
TOTAL PROTEIN: 7.7 G/DL (ref 6.4–8.3)
VITAMIN D 25-HYDROXY: 28 NG/ML (ref 30–100)
WBC # BLD: 9.9 E9/L (ref 4.5–11.5)

## 2019-06-20 PROCEDURE — 36415 COLL VENOUS BLD VENIPUNCTURE: CPT

## 2019-06-20 PROCEDURE — 80048 BASIC METABOLIC PNL TOTAL CA: CPT

## 2019-06-20 PROCEDURE — 80076 HEPATIC FUNCTION PANEL: CPT

## 2019-06-20 PROCEDURE — 82306 VITAMIN D 25 HYDROXY: CPT

## 2019-06-20 PROCEDURE — 85025 COMPLETE CBC W/AUTO DIFF WBC: CPT

## 2019-06-20 PROCEDURE — 84100 ASSAY OF PHOSPHORUS: CPT

## 2019-06-23 ENCOUNTER — TELEPHONE (OUTPATIENT)
Dept: ENDOCRINOLOGY | Age: 29
End: 2019-06-23

## 2019-06-25 ENCOUNTER — OFFICE VISIT (OUTPATIENT)
Dept: ENDOCRINOLOGY | Age: 29
End: 2019-06-25
Payer: MEDICARE

## 2019-06-25 VITALS
HEIGHT: 65 IN | BODY MASS INDEX: 44.22 KG/M2 | WEIGHT: 265.4 LBS | HEART RATE: 101 BPM | SYSTOLIC BLOOD PRESSURE: 122 MMHG | OXYGEN SATURATION: 97 % | DIASTOLIC BLOOD PRESSURE: 78 MMHG | RESPIRATION RATE: 16 BRPM

## 2019-06-25 DIAGNOSIS — E83.51 HYPOCALCEMIA: ICD-10-CM

## 2019-06-25 DIAGNOSIS — E07.9 THYROID DISORDER: ICD-10-CM

## 2019-06-25 DIAGNOSIS — E20.9 HYPOPARATHYROIDISM, UNSPECIFIED HYPOPARATHYROIDISM TYPE (HCC): Primary | ICD-10-CM

## 2019-06-25 DIAGNOSIS — E55.9 VITAMIN D DEFICIENCY: ICD-10-CM

## 2019-06-25 PROCEDURE — 99214 OFFICE O/P EST MOD 30 MIN: CPT | Performed by: INTERNAL MEDICINE

## 2019-06-25 RX ORDER — CALCITRIOL 0.5 UG/1
0.5 CAPSULE, LIQUID FILLED ORAL 2 TIMES DAILY
Qty: 180 CAPSULE | Refills: 3 | Status: SHIPPED | OUTPATIENT
Start: 2019-06-25 | End: 2019-11-05 | Stop reason: SDUPTHER

## 2019-06-25 RX ORDER — PHENOL 1.4 %
AEROSOL, SPRAY (ML) MUCOUS MEMBRANE
Qty: 180 TABLET | Refills: 12 | Status: SHIPPED | OUTPATIENT
Start: 2019-06-25 | End: 2019-11-05 | Stop reason: SDUPTHER

## 2019-06-25 NOTE — LETTER
[address] [place] [address] Department of Endocrinology Diabetes and Metabolism   [address] [facility] [address]   Phone: [phone]  Fax: [phone]      Provider: Camilla Dow MD  Primary Care Physician: Alfonso Sue DO   Referring Provider: No ref. provider found    Patient: Valery Lion  YOB: 1990  Date of Visit: 6/25/2019      Dear Dr. Alfonso Sue DO   I had the pleasure of seeing your patient Valery Lion today at endocrine clinic for follow up visit and I enclosed a copy of the office visit completed today. Thank you very much for asking us to participate in the care of this very pleasant patient. Please don't hesitate to call if there are any further questions or concerns. Sincerely   Camilla Dow MD  Endocrinologist, [facility]   [address] [facility] [address]   Phone: [phone]  Fax: [phone]      ENDOCRINOLOGY CLINIC NOTE    Date of Service: 6/25/2019    Medical Records Reviewed:   Inpatient records, outpatient records, outside records      Care Team:  Primary Care Physician: Alfonso Sue DO. Provider: Camilla Dow MD  Other provider(s):            Reason for the visit:  Hypoparathyroidism, Hypocalcemia     HPI  DiGeorge Syndrome with chronic hypocalcemia  The history is provided by the patient. No  was used. Accuracy of the patient data is excellent. Valery Lion is a very pleasant 34 y.o. 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 I have reviewed all her previous lab and calcium level has been running between 5.4 and 5.6 for many years. Previous labs summarized below. At last visit, she was complaining of hypocalcemia (tingling around mouth, finger tips and muscle cramps).  She was sent to the ED for IV ca and she was started on OP therapy. She was to start Calcitriol 0.5 mcg BID, VitD 47005 iu/wk, calcium carbonate 1200 mg TID . She is taking everything except she is missing the afternoon dose of calcium carbonate 1200mg due to being at work and not being able to receive medication without paperwork. Today she denies symptoms of hypocalcemia (tingling around mouth, finger tips and muscle cramps)     The patient denied any h/o kidney stones, spontaneous fractures, or peptic ulcer disease    Acute Illness  Started to get sick in April with a head cold. This has been off and on but has gotten worse over the last week. She is feeling nauseaus. Throat hurts so bad she can barely sleep. Coughing up phlegm - yellow. Always feels like she is in water when she is laying down and can't sleep due to this. Mother feels this could be related to her recent initiation of Tremfya for psoriasis. Recently started Tremfya from Dr. Nimesh Alejandre for psoriasis. This has helped with her psoriasis. However, she has been feeling worsening illness 4-5 days after her injection. She has had 3 injections every 2-6 weeks. Last injection was 1 week ago. Mother states that during prior episodes of illnes, Dr. Nimesh Alejandre did not feel her symptoms were related to the injections    She did see her PCP for these issues about 5 days ago. Rapid strep at that time was normal. Her wbc was normal as well. She has responded to abx therapy in the past for similar complaints. She has asthma. Has not used any inhalers. She is prescribed qvar and albuterol PRN   She had her last echo 9/16 which showed EF of 50%, mild concentric LVH and normal diastolic function.        PAST MEDICAL HISTORY   Past Medical History:   Diagnosis Date    Development delay     DiGeorge syndrome ([address]     Fine motor delay     Obesity     Velocardiofacial syndrome      PAST SURGICAL HISTORY   Past Surgical History:   Procedure Laterality Date    CARDIAC SURGERY       SOCIAL HISTORY Social History     Socioeconomic History    Marital status: Single     Spouse name: Not on file    Number of children: Not on file    Years of education: Not on file    Highest education level: Not on file   Occupational History    Not on file   Social Needs    Financial resource strain: Not on file    Food insecurity:     Worry: Not on file     Inability: Not on file    Transportation needs:     Medical: Not on file     Non-medical: Not on file   Tobacco Use    Smoking status: Never Smoker    Smokeless tobacco: Never Used   Substance and Sexual Activity    Alcohol use: No     Alcohol/week: 0.0 oz    Drug use: No    Sexual activity: Not on file   Lifestyle    Physical activity:     Days per week: Not on file     Minutes per session: Not on file    Stress: Not on file   Relationships    Social connections:     Talks on phone: Not on file     Gets together: Not on file     Attends [employer] service: Not on file     Active member of club or organization: Not on file     Attends meetings of clubs or organizations: Not on file     Relationship status: Not on file    Intimate partner violence:     Fear of current or ex partner: Not on file     Emotionally abused: Not on file     Physically abused: Not on file     Forced sexual activity: Not on file   Other Topics Concern    Not on file   Social History Narrative    Not on file     FAMILY HISTORY   Family History   Problem Relation Age of Onset    Diabetes Mother        ALLERGIES AND DRUG REACTIONS   Allergies   Allergen Reactions    Aspirin        CURRENT MEDICATIONS     Current Outpatient Medications   Medication Sig Dispense Refill    calcitRIOL (ROCALTROL) 0.5 MCG capsule Take 1 capsule by mouth 2 times daily 180 capsule 3    calcium carbonate (CALCIUM 600) 600 MG TABS tablet Take 2 tablet three times a day 180 tablet 12    vitamin D (CHOLECALCIFEROL) 91507 UNIT CAPS Take 1 capsule weekly 12 capsule 3  levETIRAcetam (KEPPRA) 750 MG tablet Take 1 tablet by mouth 2 times daily 60 tablet 5    ranitidine (ZANTAC) 300 MG tablet Take 1 tablet by mouth nightly 30 tablet 3    sertraline (ZOLOFT) 100 MG tablet TAKE 1 & 1/2 TABLETS BY MOUTH EVERY DAY 45 tablet 0    lamoTRIgine (LAMICTAL) 25 MG tablet TAKE ONE TABLET BY MOUTH TWO TIMES A DAY 60 tablet 0    Guselkumab (TREMFYA SC) Inject into the skin      busPIRone (BUSPAR) 5 MG tablet TAKE ONE TABLET BY MOUTH TWO TIMES A DAY 60 tablet 0    ibuprofen (ADVIL;MOTRIN) 800 MG tablet TAKE ONE TABLET BY MOUTH EVERY 8 HOURS AS NEEDED FOR PAIN. 90 tablet 0    cetirizine (ZYRTEC) 10 MG tablet TAKE ONE TABLET BY MOUTH DAILY 30 tablet 2    albuterol sulfate  (90 Base) MCG/ACT inhaler Inhale 2 puffs into the lungs every 6 hours as needed for Wheezing 1 Inhaler 1    beclomethasone (QVAR) 40 MCG/ACT inhaler Inhale 2 puffs into the lungs 2 times daily Rinse mouth after use 1 Inhaler 2    clobetasol (TEMOVATE) 0.05 % cream Apply topically 2 times daily Apply topically 2 times daily. 30 g 5    Ciclopirox 1 % SHAM USE EVERY OTHER DAY AS A SHAMPOO 120 mL 5    Clobetasol Propionate Emulsion 0.05 % FOAM APPLY TO RASH ON SCALP TWICE A DAY AS NEEDED  5     No current facility-administered medications for this visit. Review of Systems    Constitutional: No fever, no chills, no diaphoresis, no generalized weakness. HEENT: No blurred vision, +sore throat, no ear pain, no hair loss  Neck: + neck swelling, + difficulty swallowing,   Cadrdiopulomary: No CP, SOB or palpitation, + orthopnea or PND. + cough. GI: +N/V/D, no constipation, + abdominal pain, no melena or hematochezia   : Denied any dysuria, hematuria, flank pain, discharge, or incontinence. Skin: denied any rash, ulcer, Hirsute, or hyperpigmentation. MSK: denied any joint deformity, joint pain/swelling, muscle pain, or back pain.   Neuro: no numbess, no tingling, no weakness Psychiatric/Behavioral: Negative for sleep disturbance and dysphoric mood. The patient is not nervous/anxious. Objective:   /78 (Site: Left Upper Arm, Position: Sitting, Cuff Size: Medium Adult)   Pulse 101   Resp 16   Ht 5' 5\" (1.651 m)   Wt 265 lb 6.4 oz (120.4 kg)   SpO2 97%   BMI 44.16 kg/m²    BP Readings from Last 4 Encounters:   06/25/19 122/78   06/13/19 138/86   06/11/19 128/78   05/31/19 112/62     Wt Readings from Last 6 Encounters:   06/25/19 265 lb 6.4 oz (120.4 kg)   06/13/19 268 lb (121.6 kg)   06/11/19 269 lb 6.4 oz (122.2 kg)   05/31/19 263 lb (119.3 kg)   05/03/19 272 lb 3.2 oz (123.5 kg)   04/15/19 230 lb (104.3 kg)       Physical examination:  General: awake alert, no abnormal position or movements. HEENT: normocephalic non traumatic. No sinus pressure. Tender cervical adenopathy;  - Chvostek sign  Neck: supple, no LN enlargement, no thyromegaly, + neck tenderness, no JVD. Pulm: expiratory wheezing throughout; no crackles or rales  CVS: S1 + S2, no murmur, no heave. Abd: soft lax no tenderness, no organomegaly, audible bowel sounds. MSK: no back deformity, no local spine tenderness  Skin: warm, no lesions, no rash.  No striae no Bruises   Neuro: CN intact, sensation notmal , muscle power normal  Psych: normal mood, and affect     Lab review   Lab Results   Component Value Date/Time     06/20/2019 11:45 AM    K 4.1 06/20/2019 11:45 AM    CO2 31 (H) 06/20/2019 11:45 AM    BUN 11 06/20/2019 11:45 AM    CALCIUM 7.7 (L) 06/20/2019 11:45 AM      Lab Results   Component Value Date    PTH 11 04/06/2018    PTH 12 03/13/2017     Lab Results   Component Value Date    MG 1.5 11/01/2018    MG 1.6 04/05/2018     Lab Results   Component Value Date    PHOS 5.0 06/20/2019    PHOS 4.7 11/01/2018    PHOS 4.9 03/09/2017     Lab Results   Component Value Date    VITD25 28 06/20/2019     No results found for: PTHRP  No results found for: Najma Grady No results found for: URINEVOLUME  Lab Results   Component Value Date/Time    WBC 9.9 06/20/2019 11:45 AM    RBC 4.49 06/20/2019 11:45 AM    HGB 13.1 06/20/2019 11:45 AM    HCT 39.6 06/20/2019 11:45 AM    MCV 88.2 06/20/2019 11:45 AM    MCH 29.2 06/20/2019 11:45 AM    MCHC 33.1 06/20/2019 11:45 AM    RDW 13.5 06/20/2019 11:45 AM     06/20/2019 11:45 AM    MPV 12.5 (H) 06/20/2019 11:45 AM     Lab Results   Component Value Date/Time    GLUCOSE 95 06/20/2019 11:45 AM    GLUCOSE 132 (H) 11/01/2018 07:25 PM    GLUCOSE 117 (H) 11/01/2018 03:40 PM    GLUCOSE 123 (H) 08/15/2018 12:54 PM    LABA1C 5.9 03/10/2017 02:52 PM       Medical Records/Labs/Images review:   I personally reviewed and summarized previous records   All labs and imaging studies  were reviewed independently     Impression and plan:  Richmond Sabillon who is 34 y.o. female in the clinic today for management hypocalcemia and hypoparathyroidism     Congenital hypoparathyroidism   · Know case of Digeorge syndrome  · I had a long discussion with the pt and her mother and explained the dangerous of keeping calcium in this level   · Continue Calcitriol 0.5 mcg BID, VitD 23238 iu/wk, increase calcium carbonate to 1200 mg TID (pt was only taking this BID)  · Check BMP, albumin, PTH, Phosphorus, magnesium, vitD level, TSH, Free T4   · Pt to f/u with PCP for recurrent pharyngitis    Hypocalcemia  · Continue Calcitriol 0.5 mcg BID, VitD 15672 iu/wk, increase calcium carbonate to 1200 mg TID (pt was only taking this BID)    VitD deficiency   · Continue weekly vitD  · Check vitD level     URTI  · Pt will contact her PCP     Return in about 4 months (around 10/25/2019) for Hypocalcemia, Hypoparathyroidism . The above issues were reviewed with the patient who understood and agreed with the plan. 30 minutes were spent today in management of this patient. More than 50% of time spent on counseling of patient on above diagnosis. Thank you for allowing us to participate in the care of this patient. Please do not hesitate to contact us with any additional questions. Diagnosis Orders   1. Hypoparathyroidism, unspecified hypoparathyroidism type ([facility] [address]  Vitamin D 25 Hydroxy    Basic Metabolic Panel    ALBUMIN    calcitRIOL (ROCALTROL) 0.5 MCG capsule    calcium carbonate (CALCIUM 600) 600 MG TABS tablet    vitamin D (CHOLECALCIFEROL) 88942 UNIT CAPS   2. Hypocalcemia  Vitamin D 25 Hydroxy    Basic Metabolic Panel    ALBUMIN    calcitRIOL (ROCALTROL) 0.5 MCG capsule    calcium carbonate (CALCIUM 600) 600 MG TABS tablet    vitamin D (CHOLECALCIFEROL) 40915 UNIT CAPS   3. Vitamin D deficiency  vitamin D (CHOLECALCIFEROL) 24036 UNIT CAPS   4.  Thyroid disorder  TSH without Reflex    T4, Free       Hans Chavez MD  Endocrinologist, [facility] [place] [facility])   [address] [place], [place] [address]   Phone: [phone]  Fax: [phone]  ------------------------------

## 2019-06-25 NOTE — PROGRESS NOTES
ENDOCRINOLOGY CLINIC NOTE    Date of Service: 6/25/2019    Medical Records Reviewed:   Inpatient records, outpatient records, outside records      Care Team:  Primary Care Physician: Valdemar Rincon DO. Provider: Severiano Grizzle MD  Other provider(s):            Reason for the visit:  Hypoparathyroidism, Hypocalcemia     HPI  DiGeorge Syndrome with chronic hypocalcemia  The history is provided by the patient. No  was used. Accuracy of the patient data is excellent. Andrez Cross is a very pleasant 34 y.o. 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 I have reviewed all her previous lab and calcium level has been running between 5.4 and 5.6 for many years. Previous labs summarized below. At last visit, she was complaining of hypocalcemia (tingling around mouth, finger tips and muscle cramps). She was sent to the ED for IV ca and she was started on OP therapy. She was to start Calcitriol 0.5 mcg BID, VitD 37889 iu/wk, calcium carbonate 1200 mg TID . She is taking everything except she is missing the afternoon dose of calcium carbonate 1200mg due to being at work and not being able to receive medication without paperwork. Today she denies symptoms of hypocalcemia (tingling around mouth, finger tips and muscle cramps)     The patient denied any h/o kidney stones, spontaneous fractures, or peptic ulcer disease    Acute Illness  Started to get sick in April with a head cold. This has been off and on but has gotten worse over the last week. She is feeling nauseaus. Throat hurts so bad she can barely sleep. Coughing up phlegm - yellow. Always feels like she is in water when she is laying down and can't sleep due to this. Mother feels this could be related to her recent initiation of Tremfya for psoriasis.      Recently started Tremfya from Dr. Patito Shultz for psoriasis. This has helped with her psoriasis. However, she has been feeling worsening illness 4-5 days after her injection. She has had 3 injections every 2-6 weeks. Last injection was 1 week ago. Mother states that during prior episodes of illnes, Dr. Patito Shultz did not feel her symptoms were related to the injections    She did see her PCP for these issues about 5 days ago. Rapid strep at that time was normal. Her wbc was normal as well. She has responded to abx therapy in the past for similar complaints. She has asthma. Has not used any inhalers. She is prescribed qvar and albuterol PRN   She had her last echo 9/16 which showed EF of 50%, mild concentric LVH and normal diastolic function.        PAST MEDICAL HISTORY   Past Medical History:   Diagnosis Date    Development delay     DiGeorge syndrome (HCC)     Fine motor delay     Obesity     Velocardiofacial syndrome      PAST SURGICAL HISTORY   Past Surgical History:   Procedure Laterality Date    CARDIAC SURGERY       SOCIAL HISTORY   Social History     Socioeconomic History    Marital status: Single     Spouse name: Not on file    Number of children: Not on file    Years of education: Not on file    Highest education level: Not on file   Occupational History    Not on file   Social Needs    Financial resource strain: Not on file    Food insecurity:     Worry: Not on file     Inability: Not on file    Transportation needs:     Medical: Not on file     Non-medical: Not on file   Tobacco Use    Smoking status: Never Smoker    Smokeless tobacco: Never Used   Substance and Sexual Activity    Alcohol use: No     Alcohol/week: 0.0 oz    Drug use: No    Sexual activity: Not on file   Lifestyle    Physical activity:     Days per week: Not on file     Minutes per session: Not on file    Stress: Not on file   Relationships    Social connections:     Talks on phone: Not on file     Gets together: Not on file     Attends [employer] service: Not on file     Active member of club or organization: Not on file     Attends meetings of clubs or organizations: Not on file     Relationship status: Not on file    Intimate partner violence:     Fear of current or ex partner: Not on file     Emotionally abused: Not on file     Physically abused: Not on file     Forced sexual activity: Not on file   Other Topics Concern    Not on file   Social History Narrative    Not on file     FAMILY HISTORY   Family History   Problem Relation Age of Onset    Diabetes Mother        ALLERGIES AND DRUG REACTIONS   Allergies   Allergen Reactions    Aspirin        CURRENT MEDICATIONS     Current Outpatient Medications   Medication Sig Dispense Refill    calcitRIOL (ROCALTROL) 0.5 MCG capsule Take 1 capsule by mouth 2 times daily 180 capsule 3    calcium carbonate (CALCIUM 600) 600 MG TABS tablet Take 2 tablet three times a day 180 tablet 12    vitamin D (CHOLECALCIFEROL) 09462 UNIT CAPS Take 1 capsule weekly 12 capsule 3    levETIRAcetam (KEPPRA) 750 MG tablet Take 1 tablet by mouth 2 times daily 60 tablet 5    ranitidine (ZANTAC) 300 MG tablet Take 1 tablet by mouth nightly 30 tablet 3    sertraline (ZOLOFT) 100 MG tablet TAKE 1 & 1/2 TABLETS BY MOUTH EVERY DAY 45 tablet 0    lamoTRIgine (LAMICTAL) 25 MG tablet TAKE ONE TABLET BY MOUTH TWO TIMES A DAY 60 tablet 0    Guselkumab (TREMFYA SC) Inject into the skin      busPIRone (BUSPAR) 5 MG tablet TAKE ONE TABLET BY MOUTH TWO TIMES A DAY 60 tablet 0    ibuprofen (ADVIL;MOTRIN) 800 MG tablet TAKE ONE TABLET BY MOUTH EVERY 8 HOURS AS NEEDED FOR PAIN.  90 tablet 0    cetirizine (ZYRTEC) 10 MG tablet TAKE ONE TABLET BY MOUTH DAILY 30 tablet 2    albuterol sulfate  (90 Base) MCG/ACT inhaler Inhale 2 puffs into the lungs every 6 hours as needed for Wheezing 1 Inhaler 1    beclomethasone (QVAR) 40 MCG/ACT inhaler Inhale 2 puffs into the lungs 2 times daily Rinse mouth after use 1 Inhaler 2    clobetasol (TEMOVATE) 0.05 % cream Apply topically 2 times daily Apply topically 2 times daily. 30 g 5    Ciclopirox 1 % SHAM USE EVERY OTHER DAY AS A SHAMPOO 120 mL 5    Clobetasol Propionate Emulsion 0.05 % FOAM APPLY TO RASH ON SCALP TWICE A DAY AS NEEDED  5     No current facility-administered medications for this visit. Review of Systems    Constitutional: No fever, no chills, no diaphoresis, no generalized weakness. HEENT: No blurred vision, +sore throat, no ear pain, no hair loss  Neck: + neck swelling, + difficulty swallowing,   Cadrdiopulomary: No CP, SOB or palpitation, + orthopnea or PND. + cough. GI: +N/V/D, no constipation, + abdominal pain, no melena or hematochezia   : Denied any dysuria, hematuria, flank pain, discharge, or incontinence. Skin: denied any rash, ulcer, Hirsute, or hyperpigmentation. MSK: denied any joint deformity, joint pain/swelling, muscle pain, or back pain. Neuro: no numbess, no tingling, no weakness  Psychiatric/Behavioral: Negative for sleep disturbance and dysphoric mood. The patient is not nervous/anxious. Objective:   /78 (Site: Left Upper Arm, Position: Sitting, Cuff Size: Medium Adult)   Pulse 101   Resp 16   Ht 5' 5\" (1.651 m)   Wt 265 lb 6.4 oz (120.4 kg)   SpO2 97%   BMI 44.16 kg/m²   BP Readings from Last 4 Encounters:   06/25/19 122/78   06/13/19 138/86   06/11/19 128/78   05/31/19 112/62     Wt Readings from Last 6 Encounters:   06/25/19 265 lb 6.4 oz (120.4 kg)   06/13/19 268 lb (121.6 kg)   06/11/19 269 lb 6.4 oz (122.2 kg)   05/31/19 263 lb (119.3 kg)   05/03/19 272 lb 3.2 oz (123.5 kg)   04/15/19 230 lb (104.3 kg)       Physical examination:  General: awake alert, no abnormal position or movements. HEENT: normocephalic non traumatic. No sinus pressure. Tender cervical adenopathy;  - Chvostek sign  Neck: supple, no LN enlargement, no thyromegaly, + neck tenderness, no JVD.   Pulm: expiratory wheezing throughout; no crackles or rales  CVS: S1 + S2, no murmur, no heave. Abd: soft lax no tenderness, no organomegaly, audible bowel sounds. MSK: no back deformity, no local spine tenderness  Skin: warm, no lesions, no rash.  No striae no Bruises   Neuro: CN intact, sensation notmal , muscle power normal  Psych: normal mood, and affect     Lab review   Lab Results   Component Value Date/Time     06/20/2019 11:45 AM    K 4.1 06/20/2019 11:45 AM    CO2 31 (H) 06/20/2019 11:45 AM    BUN 11 06/20/2019 11:45 AM    CALCIUM 7.7 (L) 06/20/2019 11:45 AM      Lab Results   Component Value Date    PTH 11 04/06/2018    PTH 12 03/13/2017     Lab Results   Component Value Date    MG 1.5 11/01/2018    MG 1.6 04/05/2018     Lab Results   Component Value Date    PHOS 5.0 06/20/2019    PHOS 4.7 11/01/2018    PHOS 4.9 03/09/2017     Lab Results   Component Value Date    VITD25 28 06/20/2019     No results found for: PTHRP  No results found for: Belem Peck  No results found for: Mya Chong  Lab Results   Component Value Date/Time    WBC 9.9 06/20/2019 11:45 AM    RBC 4.49 06/20/2019 11:45 AM    HGB 13.1 06/20/2019 11:45 AM    HCT 39.6 06/20/2019 11:45 AM    MCV 88.2 06/20/2019 11:45 AM    MCH 29.2 06/20/2019 11:45 AM    MCHC 33.1 06/20/2019 11:45 AM    RDW 13.5 06/20/2019 11:45 AM     06/20/2019 11:45 AM    MPV 12.5 (H) 06/20/2019 11:45 AM     Lab Results   Component Value Date/Time    GLUCOSE 95 06/20/2019 11:45 AM    GLUCOSE 132 (H) 11/01/2018 07:25 PM    GLUCOSE 117 (H) 11/01/2018 03:40 PM    GLUCOSE 123 (H) 08/15/2018 12:54 PM    LABA1C 5.9 03/10/2017 02:52 PM       Medical Records/Labs/Images review:   I personally reviewed and summarized previous records   All labs and imaging studies  were reviewed independently     Impression and plan:  Bibi West who is 34 y.o. female in the clinic today for management hypocalcemia and hypoparathyroidism     Congenital hypoparathyroidism   · Know case of Digeorge syndrome  · I had a long discussion with the pt and her mother and explained the dangerous of keeping calcium in this level   · Continue Calcitriol 0.5 mcg BID, VitD 22292 iu/wk, increase calcium carbonate to 1200 mg TID (pt was only taking this BID)  · Check BMP, albumin, PTH, Phosphorus, magnesium, vitD level, TSH, Free T4   · Pt to f/u with PCP for recurrent pharyngitis    Hypocalcemia  · Continue Calcitriol 0.5 mcg BID, VitD 35432 iu/wk, increase calcium carbonate to 1200 mg TID (pt was only taking this BID)    VitD deficiency   · Continue weekly vitD  · Check vitD level     URTI  · Pt will contact her PCP     Return in about 4 months (around 10/25/2019) for Hypocalcemia, Hypoparathyroidism . The above issues were reviewed with the patient who understood and agreed with the plan. 30 minutes were spent today in management of this patient. More than 50% of time spent on counseling of patient on above diagnosis. Thank you for allowing us to participate in the care of this patient. Please do not hesitate to contact us with any additional questions. Diagnosis Orders   1. Hypoparathyroidism, unspecified hypoparathyroidism type ([facility] [address]  Vitamin D 25 Hydroxy    Basic Metabolic Panel    ALBUMIN    calcitRIOL (ROCALTROL) 0.5 MCG capsule    calcium carbonate (CALCIUM 600) 600 MG TABS tablet    vitamin D (CHOLECALCIFEROL) 51436 UNIT CAPS   2. Hypocalcemia  Vitamin D 25 Hydroxy    Basic Metabolic Panel    ALBUMIN    calcitRIOL (ROCALTROL) 0.5 MCG capsule    calcium carbonate (CALCIUM 600) 600 MG TABS tablet    vitamin D (CHOLECALCIFEROL) 70968 UNIT CAPS   3. Vitamin D deficiency  vitamin D (CHOLECALCIFEROL) 43295 UNIT CAPS   4.  Thyroid disorder  TSH without Reflex    T4, Free       Prudence Zaina BHATIA  Endocrinologist, [facility])   [address] [facility] [address]   Phone: [phone]  Fax: [phone]  ------------------------------

## 2019-06-28 ENCOUNTER — OFFICE VISIT (OUTPATIENT)
Dept: FAMILY MEDICINE CLINIC | Age: 29
End: 2019-06-28
Payer: MEDICARE

## 2019-06-28 VITALS
HEART RATE: 61 BPM | TEMPERATURE: 97.7 F | OXYGEN SATURATION: 96 % | WEIGHT: 267.6 LBS | SYSTOLIC BLOOD PRESSURE: 122 MMHG | RESPIRATION RATE: 16 BRPM | BODY MASS INDEX: 43.01 KG/M2 | HEIGHT: 66 IN | DIASTOLIC BLOOD PRESSURE: 72 MMHG

## 2019-06-28 DIAGNOSIS — J06.9 VIRAL URI: ICD-10-CM

## 2019-06-28 DIAGNOSIS — H61.23 BILATERAL IMPACTED CERUMEN: Primary | ICD-10-CM

## 2019-06-28 PROCEDURE — 99213 OFFICE O/P EST LOW 20 MIN: CPT | Performed by: NURSE PRACTITIONER

## 2019-06-28 RX ORDER — DEXTROMETHORPHAN HYDROBROMIDE AND PROMETHAZINE HYDROCHLORIDE 15; 6.25 MG/5ML; MG/5ML
5 SYRUP ORAL 4 TIMES DAILY PRN
Qty: 120 ML | Refills: 0 | Status: SHIPPED | OUTPATIENT
Start: 2019-06-28 | End: 2019-07-08

## 2019-06-28 ASSESSMENT — ENCOUNTER SYMPTOMS
SHORTNESS OF BREATH: 1
COUGH: 1
DIARRHEA: 0
CONSTIPATION: 0
WHEEZING: 1
SORE THROAT: 1
SINUS PRESSURE: 0
VOMITING: 0
NAUSEA: 0
VOICE CHANGE: 0
SINUS PAIN: 0

## 2019-06-28 NOTE — PROGRESS NOTES
HPI:  Patient comes intoday for   Chief Complaint   Patient presents with    URI     cough, sore throat, hoarse, stuffiness. pt states that she just got her Tremfya shot and than got sick. .    Patient taking mucinex cold and flu with no relief. Complains of congestion, sore throat, hoarseness for past week. She had her last Tremfya injeciton 6/10. Prior to Visit Medications    Medication Sig Taking? Authorizing Provider   promethazine-dextromethorphan (PROMETHAZINE-DM) 6.25-15 MG/5ML syrup Take 5 mLs by mouth 4 times daily as needed for Cough Yes TWIN Duval CNP   calcitRIOL (ROCALTROL) 0.5 MCG capsule Take 1 capsule by mouth 2 times daily Yes Rebekah Luna MD   calcium carbonate (CALCIUM 600) 600 MG TABS tablet Take 2 tablet three times a day Yes Rebekah Luna MD   vitamin D (CHOLECALCIFEROL) 30766 UNIT CAPS Take 1 capsule weekly Yes Rebekah Luna MD   levETIRAcetam (KEPPRA) 750 MG tablet Take 1 tablet by mouth 2 times daily Yes TWIN Cornejo   ranitidine (ZANTAC) 300 MG tablet Take 1 tablet by mouth nightly Yes TWIN Duval CNP   sertraline (ZOLOFT) 100 MG tablet TAKE 1 & 1/2 TABLETS BY MOUTH EVERY DAY Yes Speedy Rocha,    lamoTRIgine (LAMICTAL) 25 MG tablet TAKE ONE TABLET BY MOUTH TWO TIMES A DAY Yes Speedy Rocha, DO   Guselkumab (TREMFYA SC) Inject into the skin Yes Historical Provider, MD   busPIRone (BUSPAR) 5 MG tablet TAKE ONE TABLET BY MOUTH TWO TIMES A DAY Yes TWIN Weiss CNP   ibuprofen (ADVIL;MOTRIN) 800 MG tablet TAKE ONE TABLET BY MOUTH EVERY 8 HOURS AS NEEDED FOR PAIN.  Yes Speedy Rocha, DO   albuterol sulfate  (90 Base) MCG/ACT inhaler Inhale 2 puffs into the lungs every 6 hours as needed for Wheezing Yes TWIN Duval CNP   beclomethasone (QVAR) 40 MCG/ACT inhaler Inhale 2 puffs into the lungs 2 times daily Rinse mouth after use Yes Graciela Miners, APRN - CNP   clobetasol ([employer]) 0.05 % cream Apply topically 2 times daily Apply topically 2 times daily. Yes TWIN Cabrera CNP   Ciclopirox 1 % SHAM USE EVERY OTHER DAY AS A SHAMPOO Yes TWIN Cabrera CNP   Clobetasol Propionate Emulsion 0.05 % FOAM APPLY TO RASH ON SCALP TWICE A DAY AS NEEDED Yes Historical Provider, MD   cetirizine (ZYRTEC) 10 MG tablet TAKE ONE TABLET BY MOUTH DAILY  TWIN Weiss CNP         Allergies   Allergen Reactions    Aspirin          Review of Systems  Review of Systems   Constitutional: Positive for chills, diaphoresis and fatigue. Negative for fever. HENT: Positive for congestion, ear pain, sore throat and tinnitus. Negative for sinus pressure, sinus pain and voice change. Respiratory: Positive for cough, shortness of breath and wheezing. Cardiovascular: Negative for chest pain and palpitations. Gastrointestinal: Negative for constipation, diarrhea, nausea and vomiting. Musculoskeletal: Negative for myalgias. Skin: Negative for rash. Neurological: Positive for headaches. Negative for weakness and light-headedness. VS:  /72   Pulse 61   Temp 97.7 °F (36.5 °C) (Oral)   Resp 16   Ht 5' 6\" (1.676 m)   Wt 267 lb 9.6 oz (121.4 kg)   SpO2 96%   BMI 43.19 kg/m²     Physical Exam  Physical Exam   Constitutional: She is oriented to person, place, and time. She appears well-developed and well-nourished. No distress. HENT:   Head: Normocephalic and atraumatic. Ear canals impacted with cerumen, irrigated moderate amount cerumen, TMs intact, nasal turbinates erythematous and boggy, pharynx shows erythema but difficult to visualize completely due to body habitus     Neck: No tracheal deviation present. No thyromegaly present. Cardiovascular: Normal rate, regular rhythm, normal heart sounds and intact distal pulses. No murmur heard. Pulmonary/Chest: Effort normal and breath sounds normal. No respiratory distress. She has no wheezes. She has no rales.  She

## 2019-07-17 DIAGNOSIS — F32.A ANXIETY AND DEPRESSION: ICD-10-CM

## 2019-07-17 DIAGNOSIS — F41.9 ANXIETY AND DEPRESSION: ICD-10-CM

## 2019-07-17 RX ORDER — SERTRALINE HYDROCHLORIDE 100 MG/1
TABLET, FILM COATED ORAL
Qty: 45 TABLET | Refills: 2 | Status: SHIPPED
Start: 2019-07-17 | End: 2020-10-16 | Stop reason: SDUPTHER

## 2019-07-17 RX ORDER — LAMOTRIGINE 25 MG/1
TABLET ORAL
Qty: 60 TABLET | Refills: 2 | Status: SHIPPED
Start: 2019-07-17 | End: 2020-09-16 | Stop reason: SDUPTHER

## 2019-10-15 ENCOUNTER — OFFICE VISIT (OUTPATIENT)
Dept: NEUROLOGY | Age: 29
End: 2019-10-15
Payer: MEDICARE

## 2019-10-15 ENCOUNTER — OFFICE VISIT (OUTPATIENT)
Dept: FAMILY MEDICINE CLINIC | Age: 29
End: 2019-10-15
Payer: MEDICARE

## 2019-10-15 VITALS
SYSTOLIC BLOOD PRESSURE: 128 MMHG | TEMPERATURE: 98.3 F | HEIGHT: 66 IN | OXYGEN SATURATION: 96 % | RESPIRATION RATE: 16 BRPM | DIASTOLIC BLOOD PRESSURE: 80 MMHG | BODY MASS INDEX: 43.71 KG/M2 | WEIGHT: 272 LBS | HEART RATE: 89 BPM

## 2019-10-15 VITALS
SYSTOLIC BLOOD PRESSURE: 147 MMHG | WEIGHT: 271 LBS | BODY MASS INDEX: 45.15 KG/M2 | HEART RATE: 89 BPM | RESPIRATION RATE: 17 BRPM | OXYGEN SATURATION: 92 % | DIASTOLIC BLOOD PRESSURE: 93 MMHG | HEIGHT: 65 IN

## 2019-10-15 DIAGNOSIS — K21.9 GASTROESOPHAGEAL REFLUX DISEASE, ESOPHAGITIS PRESENCE NOT SPECIFIED: ICD-10-CM

## 2019-10-15 DIAGNOSIS — R73.09 ELEVATED GLUCOSE: Primary | ICD-10-CM

## 2019-10-15 DIAGNOSIS — D82.1 DIGEORGE SYNDROME (HCC): ICD-10-CM

## 2019-10-15 DIAGNOSIS — R53.83 FATIGUE, UNSPECIFIED TYPE: ICD-10-CM

## 2019-10-15 DIAGNOSIS — D82.1 DIGEORGE SYNDROME (HCC): Primary | ICD-10-CM

## 2019-10-15 DIAGNOSIS — R26.9 GAIT DIFFICULTY: ICD-10-CM

## 2019-10-15 DIAGNOSIS — Z23 IMMUNIZATION DUE: ICD-10-CM

## 2019-10-15 DIAGNOSIS — G40.409 OTHER GENERALIZED EPILEPSY, NOT INTRACTABLE, WITHOUT STATUS EPILEPTICUS (HCC): ICD-10-CM

## 2019-10-15 LAB — HBA1C MFR BLD: 6.1 %

## 2019-10-15 PROCEDURE — G0008 ADMIN INFLUENZA VIRUS VAC: HCPCS | Performed by: NURSE PRACTITIONER

## 2019-10-15 PROCEDURE — 90686 IIV4 VACC NO PRSV 0.5 ML IM: CPT | Performed by: NURSE PRACTITIONER

## 2019-10-15 PROCEDURE — 99214 OFFICE O/P EST MOD 30 MIN: CPT | Performed by: NURSE PRACTITIONER

## 2019-10-15 PROCEDURE — 99214 OFFICE O/P EST MOD 30 MIN: CPT | Performed by: CLINICAL NURSE SPECIALIST

## 2019-10-15 PROCEDURE — 83036 HEMOGLOBIN GLYCOSYLATED A1C: CPT | Performed by: NURSE PRACTITIONER

## 2019-10-15 RX ORDER — RANITIDINE 300 MG/1
300 TABLET ORAL NIGHTLY
Qty: 30 TABLET | Refills: 3 | Status: SHIPPED
Start: 2019-10-15 | End: 2020-02-11

## 2019-10-15 ASSESSMENT — ENCOUNTER SYMPTOMS
WHEEZING: 0
CONSTIPATION: 0
SHORTNESS OF BREATH: 1
VOMITING: 0
NAUSEA: 0
DIARRHEA: 0
COUGH: 0

## 2019-10-22 ENCOUNTER — OFFICE VISIT (OUTPATIENT)
Dept: PHYSICAL MEDICINE AND REHAB | Age: 29
End: 2019-10-22
Payer: MEDICARE

## 2019-10-22 VITALS
HEART RATE: 93 BPM | WEIGHT: 272 LBS | SYSTOLIC BLOOD PRESSURE: 115 MMHG | HEIGHT: 66 IN | BODY MASS INDEX: 43.71 KG/M2 | DIASTOLIC BLOOD PRESSURE: 83 MMHG

## 2019-10-22 DIAGNOSIS — Q66.70 PES CAVUS: ICD-10-CM

## 2019-10-22 DIAGNOSIS — M79.672 BILATERAL FOOT PAIN: Primary | ICD-10-CM

## 2019-10-22 DIAGNOSIS — M79.671 BILATERAL FOOT PAIN: Primary | ICD-10-CM

## 2019-10-22 PROCEDURE — 99204 OFFICE O/P NEW MOD 45 MIN: CPT | Performed by: PHYSICAL MEDICINE & REHABILITATION

## 2019-11-05 ENCOUNTER — OFFICE VISIT (OUTPATIENT)
Dept: ENDOCRINOLOGY | Age: 29
End: 2019-11-05
Payer: MEDICARE

## 2019-11-05 VITALS
RESPIRATION RATE: 16 BRPM | HEIGHT: 66 IN | BODY MASS INDEX: 43.87 KG/M2 | HEART RATE: 96 BPM | OXYGEN SATURATION: 98 % | DIASTOLIC BLOOD PRESSURE: 74 MMHG | WEIGHT: 273 LBS | SYSTOLIC BLOOD PRESSURE: 126 MMHG

## 2019-11-05 DIAGNOSIS — E20.9 HYPOPARATHYROIDISM, UNSPECIFIED HYPOPARATHYROIDISM TYPE (HCC): Primary | ICD-10-CM

## 2019-11-05 DIAGNOSIS — E07.9 THYROID DISORDER: ICD-10-CM

## 2019-11-05 DIAGNOSIS — E83.51 HYPOCALCEMIA: ICD-10-CM

## 2019-11-05 DIAGNOSIS — E55.9 VITAMIN D DEFICIENCY: ICD-10-CM

## 2019-11-05 PROCEDURE — 99214 OFFICE O/P EST MOD 30 MIN: CPT | Performed by: INTERNAL MEDICINE

## 2019-11-05 RX ORDER — PHENOL 1.4 %
AEROSOL, SPRAY (ML) MUCOUS MEMBRANE
Qty: 180 TABLET | Refills: 12 | Status: SHIPPED
Start: 2019-11-05 | End: 2021-07-19 | Stop reason: SDUPTHER

## 2019-11-05 RX ORDER — CALCITRIOL 0.5 UG/1
0.5 CAPSULE, LIQUID FILLED ORAL 2 TIMES DAILY
Qty: 180 CAPSULE | Refills: 3 | Status: SHIPPED
Start: 2019-11-05 | End: 2020-10-16 | Stop reason: SDUPTHER

## 2019-11-07 ENCOUNTER — OFFICE VISIT (OUTPATIENT)
Dept: PODIATRY | Age: 29
End: 2019-11-07
Payer: MEDICARE

## 2019-11-07 ENCOUNTER — TELEPHONE (OUTPATIENT)
Dept: PHYSICAL MEDICINE AND REHAB | Age: 29
End: 2019-11-07

## 2019-11-07 VITALS — WEIGHT: 271 LBS | HEIGHT: 65 IN | BODY MASS INDEX: 45.15 KG/M2 | RESPIRATION RATE: 20 BRPM

## 2019-11-07 DIAGNOSIS — M25.371 RIGHT ANKLE INSTABILITY: ICD-10-CM

## 2019-11-07 DIAGNOSIS — M25.372 INSTABILITY OF LEFT ANKLE JOINT: ICD-10-CM

## 2019-11-07 DIAGNOSIS — R26.2 DIFFICULTY WALKING: ICD-10-CM

## 2019-11-07 DIAGNOSIS — Q66.89 CLUBFOOT, CONGENITAL: Primary | ICD-10-CM

## 2019-11-07 PROCEDURE — 99203 OFFICE O/P NEW LOW 30 MIN: CPT | Performed by: PODIATRIST

## 2019-11-14 ENCOUNTER — HOSPITAL ENCOUNTER (OUTPATIENT)
Age: 29
Discharge: HOME | End: 2019-11-14
Payer: MEDICARE

## 2019-11-14 ENCOUNTER — APPOINTMENT (OUTPATIENT)
Dept: GENERAL RADIOLOGY | Age: 29
End: 2019-11-14
Payer: MEDICARE

## 2019-11-14 ENCOUNTER — HOSPITAL ENCOUNTER (EMERGENCY)
Age: 29
Discharge: HOME | End: 2019-11-14
Payer: MEDICARE

## 2019-11-14 VITALS
OXYGEN SATURATION: 97 % | HEART RATE: 68 BPM | TEMPERATURE: 97.9 F | RESPIRATION RATE: 20 BRPM | WEIGHT: 270 LBS | BODY MASS INDEX: 44.93 KG/M2 | SYSTOLIC BLOOD PRESSURE: 124 MMHG | DIASTOLIC BLOOD PRESSURE: 74 MMHG

## 2019-11-14 DIAGNOSIS — S20.212A CONTUSION OF LEFT CHEST WALL, INITIAL ENCOUNTER: Primary | ICD-10-CM

## 2019-11-14 DIAGNOSIS — E07.9 THYROID DISORDER: ICD-10-CM

## 2019-11-14 DIAGNOSIS — G40.409 OTHER GENERALIZED EPILEPSY, NOT INTRACTABLE, WITHOUT STATUS EPILEPTICUS (HCC): ICD-10-CM

## 2019-11-14 DIAGNOSIS — E83.51 HYPOCALCEMIA: ICD-10-CM

## 2019-11-14 DIAGNOSIS — E20.9 HYPOPARATHYROIDISM, UNSPECIFIED HYPOPARATHYROIDISM TYPE (HCC): ICD-10-CM

## 2019-11-14 LAB
ALBUMIN SERPL-MCNC: 4.4 G/DL (ref 3.5–5.2)
ANION GAP SERPL CALCULATED.3IONS-SCNC: 15 MMOL/L (ref 7–16)
BUN BLDV-MCNC: 12 MG/DL (ref 6–20)
CALCIUM SERPL-MCNC: 7.7 MG/DL (ref 8.6–10.2)
CHLORIDE BLD-SCNC: 99 MMOL/L (ref 98–107)
CO2: 28 MMOL/L (ref 22–29)
CREAT SERPL-MCNC: 0.7 MG/DL (ref 0.5–1)
GFR AFRICAN AMERICAN: >60
GFR NON-AFRICAN AMERICAN: >60 ML/MIN/1.73
GLUCOSE BLD-MCNC: 160 MG/DL (ref 74–99)
POTASSIUM SERPL-SCNC: 4 MMOL/L (ref 3.5–5)
SODIUM BLD-SCNC: 142 MMOL/L (ref 132–146)
T4 FREE: 1.22 NG/DL (ref 0.93–1.7)
TSH SERPL DL<=0.05 MIU/L-ACNC: 2.46 UIU/ML (ref 0.27–4.2)
VITAMIN D 25-HYDROXY: 24 NG/ML (ref 30–100)

## 2019-11-14 PROCEDURE — 80048 BASIC METABOLIC PNL TOTAL CA: CPT

## 2019-11-14 PROCEDURE — 71101 X-RAY EXAM UNILAT RIBS/CHEST: CPT

## 2019-11-14 PROCEDURE — 82306 VITAMIN D 25 HYDROXY: CPT

## 2019-11-14 PROCEDURE — 84439 ASSAY OF FREE THYROXINE: CPT

## 2019-11-14 PROCEDURE — 82040 ASSAY OF SERUM ALBUMIN: CPT

## 2019-11-14 PROCEDURE — 80177 DRUG SCRN QUAN LEVETIRACETAM: CPT

## 2019-11-14 PROCEDURE — 84443 ASSAY THYROID STIM HORMONE: CPT

## 2019-11-14 PROCEDURE — 36415 COLL VENOUS BLD VENIPUNCTURE: CPT

## 2019-11-14 PROCEDURE — 99212 OFFICE O/P EST SF 10 MIN: CPT

## 2019-11-14 ASSESSMENT — PAIN DESCRIPTION - ORIENTATION: ORIENTATION: LEFT

## 2019-11-14 ASSESSMENT — PAIN SCALES - GENERAL: PAINLEVEL_OUTOF10: 8

## 2019-11-14 ASSESSMENT — PAIN DESCRIPTION - PAIN TYPE: TYPE: ACUTE PAIN

## 2019-11-14 ASSESSMENT — PAIN DESCRIPTION - LOCATION: LOCATION: RIB CAGE;ABDOMEN

## 2019-11-15 ENCOUNTER — TELEPHONE (OUTPATIENT)
Dept: ENDOCRINOLOGY | Age: 29
End: 2019-11-15

## 2019-11-16 LAB — KEPPRA: 18 UG/ML (ref 12–46)

## 2019-11-18 ENCOUNTER — TELEPHONE (OUTPATIENT)
Dept: PHYSICAL THERAPY | Age: 29
End: 2019-11-18

## 2019-12-19 ENCOUNTER — OFFICE VISIT (OUTPATIENT)
Dept: PODIATRY | Age: 29
End: 2019-12-19
Payer: MEDICARE

## 2019-12-19 VITALS — BODY MASS INDEX: 43.39 KG/M2 | WEIGHT: 270 LBS | HEIGHT: 66 IN

## 2019-12-19 DIAGNOSIS — M79.674 PAIN OF TOE OF RIGHT FOOT: ICD-10-CM

## 2019-12-19 DIAGNOSIS — L60.0 OC (ONYCHOCRYPTOSIS): Primary | ICD-10-CM

## 2019-12-19 DIAGNOSIS — R26.2 DIFFICULTY WALKING: ICD-10-CM

## 2019-12-19 DIAGNOSIS — M79.675 PAIN OF TOE OF LEFT FOOT: ICD-10-CM

## 2019-12-19 DIAGNOSIS — Q66.89 CLUBFOOT, CONGENITAL: ICD-10-CM

## 2019-12-19 PROCEDURE — 99213 OFFICE O/P EST LOW 20 MIN: CPT | Performed by: PODIATRIST

## 2020-01-23 ENCOUNTER — OFFICE VISIT (OUTPATIENT)
Dept: NEUROLOGY | Age: 30
End: 2020-01-23
Payer: MEDICARE

## 2020-01-23 VITALS
HEIGHT: 65 IN | SYSTOLIC BLOOD PRESSURE: 144 MMHG | RESPIRATION RATE: 18 BRPM | HEART RATE: 108 BPM | OXYGEN SATURATION: 95 % | BODY MASS INDEX: 45.15 KG/M2 | DIASTOLIC BLOOD PRESSURE: 87 MMHG | WEIGHT: 271 LBS

## 2020-01-23 PROCEDURE — 99214 OFFICE O/P EST MOD 30 MIN: CPT | Performed by: CLINICAL NURSE SPECIALIST

## 2020-01-23 RX ORDER — LEVETIRACETAM 750 MG/1
750 TABLET ORAL 2 TIMES DAILY
Qty: 60 TABLET | Refills: 11 | Status: SHIPPED
Start: 2020-01-23 | End: 2020-10-14 | Stop reason: SDUPTHER

## 2020-01-23 NOTE — PROGRESS NOTES
Rafia Olvera is a 31 y.o. right handed female     This is a 31 y.o. woman who follows for seizures  The pt has a hx of DiGeorge syndrome with associated developmental delay and is with her mother today - who is an excellent historian. She reports seizures since birth - telling me she \"wasn't able to even hold her for hours because she wouldn't stop seizing\". She describes GTC events- her last one being April of 2017. At that time she was started on Keppra 500 mg BID. Continues on this medication without ill effects or further seizures. In 2018, her mother was still reporting \"stiffening spells\"   We increased her Keppra to 750mg BID and these stopped   No continued spells   EEG after increase was unrevealing     She is not sleeping well--- reportedly getting 5-6 hours per night. Sporadic and not restful   Snores frequently and \"makes sounds\"   But readily admits to a few hour nap everyday after work   Now c/o headaches daily -- waking up and going to bed with them     Also reports tremors -- both hands   Worsens as she gets anxious    Graduated from [employer] and attended [employer] for 4 years. She now attends [employer]. Medically, she is otherwise stable.     No chest pain or palpitations  No SOB  No vertigo, lightheadedness or loss of consciousness  No falls, tripping or stumbling  No incontinence of bowels or bladder  No itching or bruising appreciated  No numbness, tingling or focal arm/leg weakness    ROS otherwise negative     Allergies as of 01/23/2020 - Review Complete 01/23/2020   Allergen Reaction Noted    Aspirin  03/25/2013     Objective:     BP (!) 144/87 (Site: Right Upper Arm, Position: Sitting, Cuff Size: Large Adult)   Pulse 108   Resp 18   Ht 5' 5\" (1.651 m)   Wt 271 lb (122.9 kg)   SpO2 95%   BMI 45.10 kg/m²   Afebrile     General Appearance: alert, cooperative, no distress, appears stated age    Neck: supple, symmetrical, no adenopathy; no carotid bruit or JVD; large neck circumference    Lungs: clear to auscultation bilaterally, respirations unlabored   Heart: regular rate and rhythm, S1 and S2 normal, distant heart sounds; no murmur, rub or gallop   Extremities: no cyanosis or edema  Pulses: 1+ and symmetric all extremities -- hammer toes bilaterally   Skin: no rashes or lesions     Mental Status: alert; oriented to self and place; pleasant and cooperative    Speech: hypernasal  speech  Language: intact - without aphasias     Cranial Nerves:  I: smell    II: visual acuity     II: visual fields Full   II: pupils JOAN   III,VII: ptosis None   III,IV,VI: extraocular muscles  EOMI without nystagmus    V: mastication Normal   V: facial light touch sensation  Normal   V,VII: corneal reflex  Present   VII: facial muscle function - upper     VII: facial muscle function - lower Normal   VIII: hearing Normal   IX: soft palate elevation  Normal   IX,X: gag reflex Present   XI: trapezius strength  5/5   XI: sternocleidomastoid strength 5/5   XI: neck extension strength  5/5   XII: tongue strength  Normal     Motor  5/5 throughout  Slightly decreased tone throughout  Overweight bulk    Sensory:  LT and PP normal  Vibration minimally decreased in ankles     Coordination:   FN, FFM and EUGENIO symmetrical     Gait:  Slow and slightly unsteady  Walks on toes mainly     DTR:   BE throughout  No Celis's  No Babinski's  Bilateral grasp reflexes present     Laboratory/Radiology:     CBC with Differential:    Lab Results   Component Value Date    WBC 9.9 06/20/2019    RBC 4.49 06/20/2019    HGB 13.1 06/20/2019    HCT 39.6 06/20/2019     06/20/2019    MCV 88.2 06/20/2019    MCH 29.2 06/20/2019    MCHC 33.1 06/20/2019    RDW 13.5 06/20/2019    LYMPHOPCT 25.2 06/20/2019    MONOPCT 7.3 06/20/2019    BASOPCT 0.3 06/20/2019    MONOSABS 0.72 06/20/2019    LYMPHSABS 2.49 06/20/2019    EOSABS 0.27 06/20/2019    BASOSABS 0.03 06/20/2019     CMP:    Lab Results   Component Value Date

## 2020-02-11 ENCOUNTER — OFFICE VISIT (OUTPATIENT)
Dept: FAMILY MEDICINE CLINIC | Age: 30
End: 2020-02-11
Payer: MEDICARE

## 2020-02-11 VITALS
DIASTOLIC BLOOD PRESSURE: 66 MMHG | TEMPERATURE: 98.2 F | SYSTOLIC BLOOD PRESSURE: 118 MMHG | OXYGEN SATURATION: 96 % | RESPIRATION RATE: 16 BRPM | WEIGHT: 268 LBS | HEIGHT: 65 IN | HEART RATE: 98 BPM | BODY MASS INDEX: 44.65 KG/M2

## 2020-02-11 PROCEDURE — 99213 OFFICE O/P EST LOW 20 MIN: CPT | Performed by: NURSE PRACTITIONER

## 2020-02-11 RX ORDER — CETIRIZINE HYDROCHLORIDE 10 MG/1
TABLET ORAL
Qty: 30 TABLET | Refills: 3 | Status: SHIPPED | OUTPATIENT
Start: 2020-02-11

## 2020-02-11 RX ORDER — BROMPHENIRAMINE MALEATE, PSEUDOEPHEDRINE HYDROCHLORIDE, AND DEXTROMETHORPHAN HYDROBROMIDE 2; 30; 10 MG/5ML; MG/5ML; MG/5ML
5 SYRUP ORAL 4 TIMES DAILY PRN
Qty: 1 BOTTLE | Refills: 0 | Status: SHIPPED
Start: 2020-02-11 | End: 2020-02-28

## 2020-02-11 RX ORDER — FAMOTIDINE 20 MG/1
20 TABLET, FILM COATED ORAL 2 TIMES DAILY
Qty: 60 TABLET | Refills: 3 | Status: SHIPPED
Start: 2020-02-11 | End: 2020-06-09 | Stop reason: SDUPTHER

## 2020-02-11 ASSESSMENT — ENCOUNTER SYMPTOMS
WHEEZING: 0
COUGH: 1
VOMITING: 0
SORE THROAT: 1
SINUS PRESSURE: 1
DIARRHEA: 0
CONSTIPATION: 0
SINUS PAIN: 1
NAUSEA: 0
SHORTNESS OF BREATH: 0

## 2020-02-11 NOTE — PROGRESS NOTES
No murmur. Pulmonary:      Effort: Pulmonary effort is normal.      Breath sounds: Normal breath sounds. No wheezing or rales. Chest:      Chest wall: No tenderness. Abdominal:      General: Bowel sounds are normal.      Palpations: Abdomen is soft. Tenderness: There is no abdominal tenderness. Lymphadenopathy:      Cervical: No cervical adenopathy. Skin:     General: Skin is warm and dry. Neurological:      Mental Status: She is alert and oriented to person, place, and time. Psychiatric:         Behavior: Behavior normal.           Assessment/Plan:  Peng was seen today for gastroesophageal reflux and uri. Diagnoses and all orders for this visit:    Gastroesophageal reflux disease without esophagitis  -     famotidine (PEPCID) 20 MG tablet; Take 1 tablet by mouth 2 times daily  -Contact office if symptoms do not improve as expected or worsen. Seasonal allergies  -     cetirizine (ZYRTEC) 10 MG tablet; TAKE ONE TABLET BY MOUTH DAILY    Viral URI  -     brompheniramine-pseudoephedrine-DM (BROMFED DM) 2-30-10 MG/5ML syrup; Take 5 mLs by mouth 4 times daily as needed for Congestion or Cough  -Contact office if symptoms do not improve as expected or worsen.           Greater than 15  Minutes was spent with patient and more than 50% of the time was spent face to facecounseling and educating regarding diagnoses

## 2020-02-19 ENCOUNTER — OFFICE VISIT (OUTPATIENT)
Dept: PODIATRY | Age: 30
End: 2020-02-19
Payer: MEDICARE

## 2020-02-19 VITALS — BODY MASS INDEX: 43.32 KG/M2 | HEIGHT: 65 IN | WEIGHT: 260 LBS

## 2020-02-19 PROCEDURE — 99213 OFFICE O/P EST LOW 20 MIN: CPT | Performed by: PODIATRIST

## 2020-02-19 NOTE — PROGRESS NOTES
Patient is in today for 2 month follow up of bilateral clubfoot. Patient has been wearing her custom orthotics and has had no problems with those. Patient does have some callouses on bilateral feet that she would like looked at today.  pcp is Nam Benson DO last ov 2-

## 2020-02-20 NOTE — PROGRESS NOTES
20     Gilford Lana    : 1990   Sex: female    Age: 31 y.o. Patient's PCP/Provider is:  Akin Jean Baptiste DO    Subjective:  Patient is seen today for follow-up regarding continued treatment regarding clubfoot issues bilaterally. She is also dealing with chronic ingrown nails to both great toes. She did recently get her AFO device and additional orthotic recently which she has been wearing. No additional issues noted at this time. Chief Complaint   Patient presents with    Foot Pain     bilateral clubfoot        ROS:  Const: Positives and pertinent negatives as per HPI. Musculo: Denies symptoms other than stated above. Neuro: Denies symptoms other than stated above. Skin: Denies symptoms other than stated above.     Current Medications:    Current Outpatient Medications:     cetirizine (ZYRTEC) 10 MG tablet, TAKE ONE TABLET BY MOUTH DAILY, Disp: 30 tablet, Rfl: 3    famotidine (PEPCID) 20 MG tablet, Take 1 tablet by mouth 2 times daily, Disp: 60 tablet, Rfl: 3    brompheniramine-pseudoephedrine-DM (BROMFED DM) 2-30-10 MG/5ML syrup, Take 5 mLs by mouth 4 times daily as needed for Congestion or Cough, Disp: 1 Bottle, Rfl: 0    levETIRAcetam (KEPPRA) 750 MG tablet, Take 1 tablet by mouth 2 times daily, Disp: 60 tablet, Rfl: 11    vitamin D (CHOLECALCIFEROL) 59851 UNIT CAPS, Take 1 capsule weekly, Disp: 12 capsule, Rfl: 3    calcitRIOL (ROCALTROL) 0.5 MCG capsule, Take 1 capsule by mouth 2 times daily, Disp: 180 capsule, Rfl: 3    calcium carbonate (CALCIUM 600) 600 MG TABS tablet, Take 2 tablet three times a day, Disp: 180 tablet, Rfl: 12    sertraline (ZOLOFT) 100 MG tablet, TAKE 1 & 1/2 TABLETS BY MOUTH EVERY DAY, Disp: 45 tablet, Rfl: 2    lamoTRIgine (LAMICTAL) 25 MG tablet, TAKE 1 TABLET BY MOUTH TWICE A DAY, Disp: 60 tablet, Rfl: 2    Guselkumab (TREMFYA SC), Inject into the skin, Disp: , Rfl:     busPIRone (BUSPAR) 5 MG tablet, TAKE ONE TABLET BY MOUTH TWO TIMES A DAY, Disp: 60 tablet, Rfl: 0    ibuprofen (ADVIL;MOTRIN) 800 MG tablet, TAKE ONE TABLET BY MOUTH EVERY 8 HOURS AS NEEDED FOR PAIN., Disp: 90 tablet, Rfl: 0    albuterol sulfate  (90 Base) MCG/ACT inhaler, Inhale 2 puffs into the lungs every 6 hours as needed for Wheezing, Disp: 1 Inhaler, Rfl: 1    beclomethasone (QVAR) 40 MCG/ACT inhaler, Inhale 2 puffs into the lungs 2 times daily Rinse mouth after use, Disp: 1 Inhaler, Rfl: 2    clobetasol (TEMOVATE) 0.05 % cream, Apply topically 2 times daily Apply topically 2 times daily. , Disp: 30 g, Rfl: 5    Ciclopirox 1 % SHAM, USE EVERY OTHER DAY AS A SHAMPOO, Disp: 120 mL, Rfl: 5    Clobetasol Propionate Emulsion 0.05 % FOAM, APPLY TO RASH ON SCALP TWICE A DAY AS NEEDED, Disp: , Rfl: 5    Allergies: Allergies   Allergen Reactions    Aspirin Other (See Comments)       Vitals:    02/19/20 1554   Weight: 260 lb (117.9 kg)   Height: 5' 5\" (1.651 m)       Exam:  Neurovascular status unchanged. Ingrown nails noted to both great toes. No signs of infection noted bilateral great toes. Clubfoot issues are stable to both lower extremities with current AFO and orthotic in place. No maceration webspaces noted. Edematous changes are stable bilateral lower extremities. Diagnostic Studies:     No results found. Procedures:    None    Plan Per Assessment  Peng was seen today for foot pain. Diagnoses and all orders for this visit:    OC (onychocryptosis)    Clubfoot, congenital    Difficulty walking      1. Evaluation and management  2. We did discuss continued use of the AFO and orthotics to help alleviate the gait abnormalities. 3. Debridement ingrown nails was performed to patient tolerance. No nail matrix procedure performed on today's visit. 4. Patient will be followed up in 2 months time or sooner if needed for reevaluation.       Seen By:    Clarisa Calderon DPM    Electronically signed by Clarisa Calderon DPM on 2/19/2020 at 9:17

## 2020-02-28 ENCOUNTER — OFFICE VISIT (OUTPATIENT)
Dept: FAMILY MEDICINE CLINIC | Age: 30
End: 2020-02-28
Payer: MEDICARE

## 2020-02-28 VITALS
WEIGHT: 269 LBS | BODY MASS INDEX: 44.82 KG/M2 | HEIGHT: 65 IN | RESPIRATION RATE: 16 BRPM | SYSTOLIC BLOOD PRESSURE: 122 MMHG | HEART RATE: 102 BPM | OXYGEN SATURATION: 97 % | DIASTOLIC BLOOD PRESSURE: 64 MMHG | TEMPERATURE: 98.2 F

## 2020-02-28 PROCEDURE — 99213 OFFICE O/P EST LOW 20 MIN: CPT | Performed by: NURSE PRACTITIONER

## 2020-02-28 RX ORDER — ALBUTEROL SULFATE 90 UG/1
2 AEROSOL, METERED RESPIRATORY (INHALATION) EVERY 6 HOURS PRN
Qty: 1 INHALER | Refills: 0 | Status: SHIPPED
Start: 2020-02-28 | End: 2021-04-19 | Stop reason: SDUPTHER

## 2020-02-28 ASSESSMENT — ENCOUNTER SYMPTOMS
VOMITING: 0
COUGH: 0
WHEEZING: 0
DIARRHEA: 0
SHORTNESS OF BREATH: 0
NAUSEA: 0
CONSTIPATION: 0

## 2020-02-28 NOTE — PROGRESS NOTES
Propionate Emulsion 0.05 % FOAM APPLY TO RASH ON SCALP TWICE A DAY AS NEEDED Yes Historical Provider, MD         Allergies   Allergen Reactions    Aspirin Other (See Comments)         Review of Systems  Review of Systems   Constitutional: Negative for activity change, appetite change and unexpected weight change. Respiratory: Negative for cough, shortness of breath and wheezing. Cardiovascular: Negative for chest pain and palpitations. Gastrointestinal: Negative for constipation, diarrhea, nausea and vomiting. Neurological: Negative for weakness and headaches. VS:  /64   Pulse 102   Temp 98.2 °F (36.8 °C) (Oral)   Resp 16   Ht 5' 5\" (1.651 m)   Wt 269 lb (122 kg)   SpO2 97%   BMI 44.76 kg/m²     Physical Exam  Physical Exam  Constitutional:       General: She is not in acute distress. Appearance: She is well-developed. HENT:      Head: Normocephalic and atraumatic. Neck:      Thyroid: No thyromegaly. Trachea: No tracheal deviation. Cardiovascular:      Rate and Rhythm: Normal rate and regular rhythm. Heart sounds: No murmur. Pulmonary:      Effort: Pulmonary effort is normal.      Breath sounds: Normal breath sounds. No wheezing or rales. Chest:      Chest wall: No tenderness. Abdominal:      General: Bowel sounds are normal.      Palpations: Abdomen is soft. Tenderness: There is no abdominal tenderness. Lymphadenopathy:      Cervical: No cervical adenopathy. Skin:     General: Skin is warm and dry. Neurological:      Mental Status: She is alert and oriented to person, place, and time. Psychiatric:         Behavior: Behavior normal.           Assessment/Plan:  Danielle Cherry was seen today for discuss medications. Diagnoses and all orders for this visit:    Mild persistent asthma without complication  -     albuterol sulfate  (90 Base) MCG/ACT inhaler;  Inhale 2 puffs into the lungs every 6 hours as needed for Wheezing or Shortness of Breath  -The current medical regimen is effective;  continue present plan and medications.           Greater than 15  Minutes was spent with patient and more than 50% of the time was spent face to facecounseling and educating regarding diagnoses

## 2020-03-05 ENCOUNTER — TELEPHONE (OUTPATIENT)
Dept: FAMILY MEDICINE CLINIC | Age: 30
End: 2020-03-05

## 2020-03-13 ENCOUNTER — OFFICE VISIT (OUTPATIENT)
Dept: FAMILY MEDICINE CLINIC | Age: 30
End: 2020-03-13
Payer: MEDICARE

## 2020-03-13 VITALS
WEIGHT: 274 LBS | RESPIRATION RATE: 14 BRPM | HEIGHT: 65 IN | TEMPERATURE: 97.4 F | BODY MASS INDEX: 45.65 KG/M2 | SYSTOLIC BLOOD PRESSURE: 124 MMHG | DIASTOLIC BLOOD PRESSURE: 78 MMHG | HEART RATE: 108 BPM | OXYGEN SATURATION: 97 %

## 2020-03-13 PROCEDURE — 99213 OFFICE O/P EST LOW 20 MIN: CPT | Performed by: NURSE PRACTITIONER

## 2020-03-13 ASSESSMENT — ENCOUNTER SYMPTOMS
COUGH: 0
VOMITING: 0
WHEEZING: 0
NAUSEA: 0
SHORTNESS OF BREATH: 0
CONSTIPATION: 0
DIARRHEA: 0

## 2020-03-13 NOTE — PROGRESS NOTES
HPI:  Patient comes intoday for   Chief Complaint   Patient presents with    Finger Injury     right pinky finger - hurt at work last week, was bruised, had difficulty moving. pain today 10/10    Cerumen Impaction     both ears, complains left ear is worse. has not used any debrox drops. wants removed. .    Complains of pain to right pinky finger for past week. States worse when using her phone    Complains of bilateral ear pain. Has had impacted ear wax in the past    Prior to Visit Medications    Medication Sig Taking? Authorizing Provider   albuterol sulfate  (90 Base) MCG/ACT inhaler Inhale 2 puffs into the lungs every 6 hours as needed for Wheezing or Shortness of Breath Yes TWIN Andrade CNP   cetirizine (ZYRTEC) 10 MG tablet TAKE ONE TABLET BY MOUTH DAILY Yes TWIN Andrade CNP   famotidine (PEPCID) 20 MG tablet Take 1 tablet by mouth 2 times daily Yes TWIN Andrade CNP   levETIRAcetam (KEPPRA) 750 MG tablet Take 1 tablet by mouth 2 times daily Yes TWIN Ochoa - CNS   vitamin D (CHOLECALCIFEROL) 44110 UNIT CAPS Take 1 capsule weekly Yes Sofaí Lee MD   calcitRIOL (ROCALTROL) 0.5 MCG capsule Take 1 capsule by mouth 2 times daily Yes Sofía Lee MD   calcium carbonate (CALCIUM 600) 600 MG TABS tablet Take 2 tablet three times a day Yes Sofía Lee MD   sertraline (ZOLOFT) 100 MG tablet TAKE 1 & 1/2 TABLETS BY MOUTH EVERY DAY Yes TWIN Andrade CNP   lamoTRIgine (LAMICTAL) 25 MG tablet TAKE 1 TABLET BY MOUTH TWICE A DAY Yes TWIN Andrade CNP   Guselkumab (TREMFYA SC) Inject into the skin Yes Historical Provider, MD   busPIRone (BUSPAR) 5 MG tablet TAKE ONE TABLET BY MOUTH TWO TIMES A DAY Yes TWIN Weiss - CNP   ibuprofen (ADVIL;MOTRIN) 800 MG tablet TAKE ONE TABLET BY MOUTH EVERY 8 HOURS AS NEEDED FOR PAIN.  Yes Martha Bill,    clobetasol (TEMOVATE) 0.05 % cream Apply topically 2 times daily Apply topically 2 times daily. Yes Wes Score, APRN - CNP   Ciclopirox 1 % SHAM USE EVERY OTHER DAY AS A SHAMPOO Yes Wes Score, APRN - CNP   Clobetasol Propionate Emulsion 0.05 % FOAM APPLY TO RASH ON SCALP TWICE A DAY AS NEEDED Yes Historical Provider, MD         Allergies   Allergen Reactions    Aspirin Other (See Comments)         Review of Systems  Review of Systems   Constitutional: Negative for activity change and appetite change. HENT: Positive for ear pain. Respiratory: Negative for cough, shortness of breath and wheezing. Cardiovascular: Negative for chest pain and palpitations. Gastrointestinal: Negative for constipation, diarrhea, nausea and vomiting. Musculoskeletal: Positive for arthralgias. Neurological: Negative for weakness, light-headedness and headaches. VS:  /78   Pulse 108   Temp 97.4 °F (36.3 °C) (Oral)   Resp 14   Ht 5' 5\" (1.651 m)   Wt 274 lb (124.3 kg)   SpO2 97%   BMI 45.60 kg/m²     Physical Exam  Physical Exam  Constitutional:       General: She is not in acute distress. Appearance: She is well-developed. HENT:      Head: Normocephalic and atraumatic. Right Ear: There is impacted cerumen. Left Ear: There is impacted cerumen. Ears:      Comments: Large amount of cerumen irrigated from left ear, small amount from right ear. Bilateral TMs intact after irrigation  Neck:      Thyroid: No thyromegaly. Trachea: No tracheal deviation. Cardiovascular:      Rate and Rhythm: Normal rate and regular rhythm. Heart sounds: No murmur. Pulmonary:      Effort: Pulmonary effort is normal.      Breath sounds: Normal breath sounds. No wheezing or rales. Chest:      Chest wall: No tenderness. Abdominal:      General: Bowel sounds are normal.      Palpations: Abdomen is soft. Tenderness: There is no abdominal tenderness. Musculoskeletal:         General: Tenderness present. No deformity.       Comments: Right pinky finger shows no obvious injury, ROM not painful. Cap refill < 2 sec   Lymphadenopathy:      Cervical: No cervical adenopathy. Skin:     General: Skin is warm and dry. Neurological:      Mental Status: She is alert and oriented to person, place, and time. Psychiatric:         Behavior: Behavior normal.           Assessment/Plan:  Peng was seen today for finger injury and cerumen impaction. Diagnoses and all orders for this visit:    Bilateral impacted cerumen  -     REMOVAL IMPACTED CERUMEN    Pain of finger of right hand  -rest  -Contact office if symptoms do not improve as expected or worsen.           Greater than 15  Minutes was spent with patient and more than 50% of the time was spent face to facecounseling and educating regarding diagnoses

## 2020-05-07 ENCOUNTER — OFFICE VISIT (OUTPATIENT)
Dept: PODIATRY | Age: 30
End: 2020-05-07
Payer: MEDICARE

## 2020-05-07 VITALS — WEIGHT: 260 LBS | OXYGEN SATURATION: 97 % | HEART RATE: 108 BPM | HEIGHT: 65 IN | BODY MASS INDEX: 43.32 KG/M2

## 2020-05-07 PROCEDURE — 99213 OFFICE O/P EST LOW 20 MIN: CPT | Performed by: PODIATRIST

## 2020-05-07 NOTE — PROGRESS NOTES
Patient is being seen today for a 2 month clubfoot follow up and nail care. Patient states having stabbing pains in both feet. Especially when putting lotion on. Not wearing orthotics since COVID and been house bound. No AFO braces recently. pcp is DO ronnie Beck 2-.

## 2020-05-08 NOTE — PROGRESS NOTES
20     Russell Monahan    : 1990   Sex: female    Age: 31 y.o. Patient's PCP/Provider is:  Ousmane Mcdonough DO    Subjective:  Patient is seen today for follow-up regarding continued care ingrown nail issues bilateral great toes. She is still having some right ankle instability issues as well. Denies any nausea, vomiting, fever, chills. No other additional issues noted at this time. Chief Complaint   Patient presents with    Foot Pain     bilateral    Nail Problem     nail care       ROS:  Const: Positives and pertinent negatives as per HPI. Musculo: Denies symptoms other than stated above. Neuro: Denies symptoms other than stated above. Skin: Denies symptoms other than stated above.     Current Medications:    Current Outpatient Medications:     albuterol sulfate  (90 Base) MCG/ACT inhaler, Inhale 2 puffs into the lungs every 6 hours as needed for Wheezing or Shortness of Breath, Disp: 1 Inhaler, Rfl: 0    cetirizine (ZYRTEC) 10 MG tablet, TAKE ONE TABLET BY MOUTH DAILY, Disp: 30 tablet, Rfl: 3    famotidine (PEPCID) 20 MG tablet, Take 1 tablet by mouth 2 times daily, Disp: 60 tablet, Rfl: 3    levETIRAcetam (KEPPRA) 750 MG tablet, Take 1 tablet by mouth 2 times daily, Disp: 60 tablet, Rfl: 11    vitamin D (CHOLECALCIFEROL) 09923 UNIT CAPS, Take 1 capsule weekly, Disp: 12 capsule, Rfl: 3    calcitRIOL (ROCALTROL) 0.5 MCG capsule, Take 1 capsule by mouth 2 times daily, Disp: 180 capsule, Rfl: 3    calcium carbonate (CALCIUM 600) 600 MG TABS tablet, Take 2 tablet three times a day, Disp: 180 tablet, Rfl: 12    sertraline (ZOLOFT) 100 MG tablet, TAKE 1 & 1/2 TABLETS BY MOUTH EVERY DAY, Disp: 45 tablet, Rfl: 2    lamoTRIgine (LAMICTAL) 25 MG tablet, TAKE 1 TABLET BY MOUTH TWICE A DAY, Disp: 60 tablet, Rfl: 2    Guselkumab (TREMFYA SC), Inject into the skin, Disp: , Rfl:     busPIRone (BUSPAR) 5 MG tablet, TAKE ONE TABLET BY MOUTH TWO TIMES A DAY, Disp: 60 tablet, Rfl: 0    ibuprofen (ADVIL;MOTRIN) 800 MG tablet, TAKE ONE TABLET BY MOUTH EVERY 8 HOURS AS NEEDED FOR PAIN., Disp: 90 tablet, Rfl: 0    clobetasol (TEMOVATE) 0.05 % cream, Apply topically 2 times daily Apply topically 2 times daily. , Disp: 30 g, Rfl: 5    Ciclopirox 1 % SHAM, USE EVERY OTHER DAY AS A SHAMPOO, Disp: 120 mL, Rfl: 5    Clobetasol Propionate Emulsion 0.05 % FOAM, APPLY TO RASH ON SCALP TWICE A DAY AS NEEDED, Disp: , Rfl: 5    Allergies: Allergies   Allergen Reactions    Aspirin Other (See Comments)       Vitals:    05/07/20 1355   Pulse: 108   SpO2: 97%   Weight: 260 lb (117.9 kg)   Height: 5' 5\" (1.651 m)       Exam:  NVS status unchanged. Bilateral great toes are incurvated with tenderness noted to palpation. No signs of infection noted bilateral great toes. Mild tenderness noted to palpation right lateral ankle with range of motion and muscle testing performed. Diagnostic Studies:     No results found. Procedures:    None    Plan Per Assessment  Peng was seen today for foot pain and nail problem. Diagnoses and all orders for this visit:    OC (onychocryptosis)    Pain of toe of right foot    Pain of toe of left foot    Right ankle instability      1. Evaluation and management  2. Debridement ingrown nails was performed to patient tolerance. No nail matrix procedure performed on today's visit. 3. Patient and her mother were advised continued use of the AFO device right lower extremity with ambulatory activities. 4. Will be followed up in 2 months time or sooner if needed for reevaluation. Seen By:    Anup Ingram DPM    Electronically signed by Anup Ingram DPM on 5/8/2020 at 9:17 AM    This note was created using voice recognition software. The note was reviewed however may contain grammatical errors.

## 2020-06-09 ENCOUNTER — TELEMEDICINE (OUTPATIENT)
Dept: FAMILY MEDICINE CLINIC | Age: 30
End: 2020-06-09
Payer: MEDICARE

## 2020-06-09 PROCEDURE — 99213 OFFICE O/P EST LOW 20 MIN: CPT | Performed by: NURSE PRACTITIONER

## 2020-06-09 RX ORDER — FAMOTIDINE 20 MG/1
20 TABLET, FILM COATED ORAL 2 TIMES DAILY
Qty: 60 TABLET | Refills: 3 | Status: SHIPPED
Start: 2020-06-09 | End: 2020-12-01

## 2020-06-09 ASSESSMENT — ENCOUNTER SYMPTOMS
COUGH: 0
SHORTNESS OF BREATH: 0
SORE THROAT: 1
VOMITING: 0
WHEEZING: 0
ABDOMINAL PAIN: 1
NAUSEA: 0
CONSTIPATION: 0
DIARRHEA: 0

## 2020-06-09 NOTE — PROGRESS NOTES
Fior Sheets, APRN - CNS   vitamin D (CHOLECALCIFEROL) 26059 UNIT CAPS Take 1 capsule weekly Yes Milad Radene Boxer, MD   calcitRIOL (ROCALTROL) 0.5 MCG capsule Take 1 capsule by mouth 2 times daily Yes Elis Varghese MD   calcium carbonate (CALCIUM 600) 600 MG TABS tablet Take 2 tablet three times a day Yes Elis Varghese MD   sertraline (ZOLOFT) 100 MG tablet TAKE 1 & 1/2 TABLETS BY MOUTH EVERY DAY Yes TWIN Hughes - CNP   lamoTRIgine (LAMICTAL) 25 MG tablet TAKE 1 TABLET BY MOUTH TWICE A DAY Yes TWIN Hughes - CNP   Guselkumab (TREMFYA SC) Inject into the skin Yes Historical Provider, MD   busPIRone (BUSPAR) 5 MG tablet TAKE ONE TABLET BY MOUTH TWO TIMES A DAY Yes TWIN Weiss - CNP   ibuprofen (ADVIL;MOTRIN) 800 MG tablet TAKE ONE TABLET BY MOUTH EVERY 8 HOURS AS NEEDED FOR PAIN. Yes Bee Rosales,    clobetasol (TEMOVATE) 0.05 % cream Apply topically 2 times daily Apply topically 2 times daily. Yes TWIN Hughes - CNP   Ciclopirox 1 % SHAM USE EVERY OTHER DAY AS A SHAMPOO Yes TWIN Hughes - CNP   Clobetasol Propionate Emulsion 0.05 % FOAM APPLY TO RASH ON SCALP TWICE A DAY AS NEEDED Yes Historical Provider, MD         Allergies   Allergen Reactions    Aspirin Other (See Comments)         Review of Systems  Review of Systems   Constitutional: Negative for activity change, appetite change, fever and unexpected weight change. HENT: Positive for ear pain, sneezing and sore throat. Respiratory: Negative for cough, shortness of breath and wheezing. Cardiovascular: Negative for chest pain and palpitations. Gastrointestinal: Positive for abdominal pain. Negative for constipation, diarrhea, nausea and vomiting. Neurological: Negative for weakness, light-headedness and headaches. VS:  There were no vitals taken for this visit. Physical Exam  Physical Exam  Constitutional:       General: She is not in acute distress.      Appearance:

## 2020-06-15 ENCOUNTER — HOSPITAL ENCOUNTER (OUTPATIENT)
Age: 30
Discharge: HOME | End: 2020-06-15
Payer: MEDICARE

## 2020-06-15 LAB
ALBUMIN SERPL-MCNC: 4.3 G/DL (ref 3.5–5.2)
ALP BLD-CCNC: 42 U/L (ref 35–104)
ALT SERPL-CCNC: 75 U/L (ref 0–32)
ANION GAP SERPL CALCULATED.3IONS-SCNC: 14 MMOL/L (ref 7–16)
AST SERPL-CCNC: 76 U/L (ref 0–31)
BASOPHILS ABSOLUTE: 0.05 E9/L (ref 0–0.2)
BASOPHILS RELATIVE PERCENT: 0.5 % (ref 0–2)
BILIRUB SERPL-MCNC: 0.5 MG/DL (ref 0–1.2)
BUN BLDV-MCNC: 12 MG/DL (ref 6–20)
CALCIUM SERPL-MCNC: 7.7 MG/DL (ref 8.6–10.2)
CHLORIDE BLD-SCNC: 101 MMOL/L (ref 98–107)
CO2: 28 MMOL/L (ref 22–29)
CREAT SERPL-MCNC: 0.6 MG/DL (ref 0.5–1)
EOSINOPHILS ABSOLUTE: 0.25 E9/L (ref 0.05–0.5)
EOSINOPHILS RELATIVE PERCENT: 2.4 % (ref 0–6)
GFR AFRICAN AMERICAN: >60
GFR NON-AFRICAN AMERICAN: >60 ML/MIN/1.73
GLUCOSE BLD-MCNC: 110 MG/DL (ref 74–99)
HCT VFR BLD CALC: 42.2 % (ref 34–48)
HEMOGLOBIN: 14.2 G/DL (ref 11.5–15.5)
IMMATURE GRANULOCYTES #: 0.06 E9/L
IMMATURE GRANULOCYTES %: 0.6 % (ref 0–5)
LYMPHOCYTES ABSOLUTE: 2.29 E9/L (ref 1.5–4)
LYMPHOCYTES RELATIVE PERCENT: 22.4 % (ref 20–42)
MCH RBC QN AUTO: 30.5 PG (ref 26–35)
MCHC RBC AUTO-ENTMCNC: 33.6 % (ref 32–34.5)
MCV RBC AUTO: 90.8 FL (ref 80–99.9)
MONOCYTES ABSOLUTE: 0.66 E9/L (ref 0.1–0.95)
MONOCYTES RELATIVE PERCENT: 6.5 % (ref 2–12)
NEUTROPHILS ABSOLUTE: 6.92 E9/L (ref 1.8–7.3)
NEUTROPHILS RELATIVE PERCENT: 67.6 % (ref 43–80)
PDW BLD-RTO: 13.2 FL (ref 11.5–15)
PLATELET # BLD: 137 E9/L (ref 130–450)
PMV BLD AUTO: 12.4 FL (ref 7–12)
POTASSIUM SERPL-SCNC: 3.9 MMOL/L (ref 3.5–5)
RBC # BLD: 4.65 E12/L (ref 3.5–5.5)
SODIUM BLD-SCNC: 143 MMOL/L (ref 132–146)
TOTAL PROTEIN: 8.2 G/DL (ref 6.4–8.3)
TSH SERPL DL<=0.05 MIU/L-ACNC: 3.93 UIU/ML (ref 0.27–4.2)
WBC # BLD: 10.2 E9/L (ref 4.5–11.5)

## 2020-06-15 PROCEDURE — 36415 COLL VENOUS BLD VENIPUNCTURE: CPT

## 2020-06-15 PROCEDURE — 86481 TB AG RESPONSE T-CELL SUSP: CPT

## 2020-06-15 PROCEDURE — 80053 COMPREHEN METABOLIC PANEL: CPT

## 2020-06-15 PROCEDURE — 85025 COMPLETE CBC W/AUTO DIFF WBC: CPT

## 2020-06-15 PROCEDURE — 84443 ASSAY THYROID STIM HORMONE: CPT

## 2020-06-19 LAB
COMMENT: NORMAL
REPORT: NORMAL

## 2020-06-30 ENCOUNTER — OFFICE VISIT (OUTPATIENT)
Dept: FAMILY MEDICINE CLINIC | Age: 30
End: 2020-06-30
Payer: MEDICARE

## 2020-06-30 ENCOUNTER — TELEPHONE (OUTPATIENT)
Dept: SLEEP MEDICINE | Age: 30
End: 2020-06-30

## 2020-06-30 VITALS
HEIGHT: 65 IN | DIASTOLIC BLOOD PRESSURE: 76 MMHG | HEART RATE: 91 BPM | OXYGEN SATURATION: 96 % | TEMPERATURE: 98.1 F | RESPIRATION RATE: 14 BRPM | BODY MASS INDEX: 45 KG/M2 | WEIGHT: 270.1 LBS | SYSTOLIC BLOOD PRESSURE: 120 MMHG

## 2020-06-30 PROCEDURE — 99213 OFFICE O/P EST LOW 20 MIN: CPT | Performed by: NURSE PRACTITIONER

## 2020-06-30 ASSESSMENT — ENCOUNTER SYMPTOMS
SHORTNESS OF BREATH: 1
DIARRHEA: 0
VOMITING: 0
ABDOMINAL PAIN: 1
WHEEZING: 0
NAUSEA: 0
CONSTIPATION: 0
COUGH: 0

## 2020-06-30 NOTE — PROGRESS NOTES
NEEDED FOR PAIN. Yes Lauren Dean,    clobetasol (TEMOVATE) 0.05 % cream Apply topically 2 times daily Apply topically 2 times daily. Yes TWIN Pressley CNP   Ciclopirox 1 % SHAM USE EVERY OTHER DAY AS A SHAMPOO Yes TWIN Pressley CNP   Clobetasol Propionate Emulsion 0.05 % FOAM APPLY TO RASH ON SCALP TWICE A DAY AS NEEDED Yes Historical Provider, MD         Allergies   Allergen Reactions    Aspirin Other (See Comments)         Review of Systems  Review of Systems   Constitutional: Positive for unexpected weight change (gain). Negative for activity change and appetite change. HENT: Positive for ear pain (plugged). Respiratory: Positive for shortness of breath. Negative for cough and wheezing. Cardiovascular: Negative for chest pain and palpitations. Gastrointestinal: Positive for abdominal pain (RUQ). Negative for constipation, diarrhea, nausea and vomiting. Neurological: Negative for weakness and headaches. VS:  /76   Pulse 91   Temp 98.1 °F (36.7 °C) (Temporal)   Resp 14   Ht 5' 5\" (1.651 m)   Wt 270 lb 1.6 oz (122.5 kg)   SpO2 96%   BMI 44.95 kg/m²     Physical Exam  Physical Exam  Constitutional:       General: She is not in acute distress. Appearance: She is well-developed. HENT:      Head: Normocephalic and atraumatic. Right Ear: Tympanic membrane and ear canal normal. There is no impacted cerumen. Left Ear: Tympanic membrane and ear canal normal. There is no impacted cerumen. Neck:      Thyroid: No thyromegaly. Trachea: No tracheal deviation. Cardiovascular:      Rate and Rhythm: Normal rate and regular rhythm. Heart sounds: No murmur. Pulmonary:      Effort: Pulmonary effort is normal.      Breath sounds: Normal breath sounds. No wheezing or rales. Chest:      Chest wall: No tenderness. Abdominal:      General: Bowel sounds are normal. There is no distension. Palpations: Abdomen is soft. There is no mass. Tenderness: There is abdominal tenderness (RUQ and epigastric). There is no guarding. Lymphadenopathy:      Cervical: No cervical adenopathy. Skin:     General: Skin is warm and dry. Neurological:      Mental Status: She is alert and oriented to person, place, and time. Psychiatric:         Behavior: Behavior normal.           Assessment/Plan:  Peng was seen today for ear fullness, letter for school/work and tremors. Diagnoses and all orders for this visit:    RUQ pain  -     US Gallbladder Ruq; Future    Tremor of both hands  Has upcoming appointment with neurology    -ok to write letter for mom, Noa Anaya, to not work at present time due to [employer] chronic health conditions.       Greater than 15  Minutes was spent with patient and more than 50% of the time was spent face to facecounseling and educating regarding diagnoses

## 2020-07-08 ENCOUNTER — TELEMEDICINE (OUTPATIENT)
Dept: SLEEP MEDICINE | Age: 30
End: 2020-07-08
Payer: MEDICARE

## 2020-07-08 PROCEDURE — 99202 OFFICE O/P NEW SF 15 MIN: CPT | Performed by: INTERNAL MEDICINE

## 2020-07-08 NOTE — PROGRESS NOTES
Harry Mcfarland is a 31 y.o. female being evaluated by a Virtual Visit (video visit) encounter to address concerns as mentioned below. A caregiver was present when appropriate. Due to this being a TeleHealth encounter (During [facility]- public health emergency), evaluation of the following organ systems was limited: Vitals/Constitutional/EENT/Resp/CV/GI//MS/Neuro/Skin/Heme-Lymph-Imm. Pursuant to the emergency declaration under the [address] [place] [address] and the [employer] Act, this Virtual Visit was conducted with patient's (and/or legal guardian's) consent, to reduce the patient's risk of exposure to COVID-19 and provide necessary medical care. The patient (and/or legal guardian) has also been advised to contact this office for worsening conditions or problems, and seek emergency medical treatment and/or call 911 if deemed necessary. Patient identification was verified at the start of the visit: Yes    Total time spent for this encounter: 13 min  --- visit abbreviated as patient's mother reported that she had another appointment she needed to be present for  Start time: 12:55pm  End time: 1:08pm    Services were provided through a video synchronous discussion virtually to substitute for in-person clinic visit. Patient and her mother were located at home. Provider was located at the [facility]. --Randolph Pillai MD on 2020 at 12:55 PM    An electronic signature was used to authenticate this note.       [facility] Sleep Medicine    Patient Name: Harry Mcfarland  Age: 31 y.o.   : 1990    Date of Visit: 20      HPI   Harry Mcfarland is a 31 y.o. 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 Her last sleep study was in 2016. It showed REM-predominant JANELLE (AHI 2, REM AHI 10), similar to a prior study in 2014. She has never been treated with PAP therapy. She is not sleeping well and makes odd noises in her sleep, per her mother. The patient reports bad dreams. She does snore (the patient thinks it is mild, but her mother can sometimes hear her snoring from her own room). Her mom has rarely noticed a pause in her breathing during sleep. Margarette Goodell denies waking up with the sensation of choking/gasping. She tends to sleep on her stomach or side, primarily. It is not comfortable to sleep on her back. She does not have good energy during the day. She naps on a regular basis during the day, but moreso since the lockdown. She achieves about 8 hours of sleep at night, although she does have difficulty with both falling and staying asleep on occasion. She does not drive due to her disabilities. STOP-BANG score of 3/8 for  (+)snoring, (+)daytime fatigue, (-)observed apneas, (-)high blood pressure, (+)BMI>35, (-)age >47, (unknown)neck circumference >15in, (-)female gender    Parkin Sleepiness Scale not completed today (scores of 10 or higher are considered indicative of excessive daytime sleepiness)    [facility] Depression Scale (JOHN-D) not completed today  (scores of 16 or higher may be suggestive of depressive or mood-related issues).     PMH:  Past Medical History:   Diagnosis Date    Development delay     DiGeorge syndrome (HCC)     Fine motor delay     Obesity     Velocardiofacial syndrome         PSH:  Past Surgical History:   Procedure Laterality Date    CARDIAC SURGERY         Soc Hx:  Social History     Tobacco Use    Smoking status: Never Smoker    Smokeless tobacco: Never Used   Substance Use Topics    Alcohol use: No     Alcohol/week: 0.0 standard drinks    Drug use: No        Fam Hx:  Family History Problem Relation Age of Onset    Diabetes Mother         Current Outpatient Medications   Medication Sig Dispense Refill    famotidine (PEPCID) 20 MG tablet Take 1 tablet by mouth 2 times daily 60 tablet 3    albuterol sulfate  (90 Base) MCG/ACT inhaler Inhale 2 puffs into the lungs every 6 hours as needed for Wheezing or Shortness of Breath 1 Inhaler 0    cetirizine (ZYRTEC) 10 MG tablet TAKE ONE TABLET BY MOUTH DAILY 30 tablet 3    levETIRAcetam (KEPPRA) 750 MG tablet Take 1 tablet by mouth 2 times daily 60 tablet 11    vitamin D (CHOLECALCIFEROL) 45525 UNIT CAPS Take 1 capsule weekly 12 capsule 3    calcitRIOL (ROCALTROL) 0.5 MCG capsule Take 1 capsule by mouth 2 times daily 180 capsule 3    calcium carbonate (CALCIUM 600) 600 MG TABS tablet Take 2 tablet three times a day 180 tablet 12    sertraline (ZOLOFT) 100 MG tablet TAKE 1 & 1/2 TABLETS BY MOUTH EVERY DAY 45 tablet 2    lamoTRIgine (LAMICTAL) 25 MG tablet TAKE 1 TABLET BY MOUTH TWICE A DAY 60 tablet 2    Guselkumab (TREMFYA SC) Inject into the skin      busPIRone (BUSPAR) 5 MG tablet TAKE ONE TABLET BY MOUTH TWO TIMES A DAY 60 tablet 0    ibuprofen (ADVIL;MOTRIN) 800 MG tablet TAKE ONE TABLET BY MOUTH EVERY 8 HOURS AS NEEDED FOR PAIN. 90 tablet 0    clobetasol (TEMOVATE) 0.05 % cream Apply topically 2 times daily Apply topically 2 times daily. 30 g 5    Ciclopirox 1 % SHAM USE EVERY OTHER DAY AS A SHAMPOO 120 mL 5    Clobetasol Propionate Emulsion 0.05 % FOAM APPLY TO RASH ON SCALP TWICE A DAY AS NEEDED  5     No current facility-administered medications for this visit. Review of Systems  Review of Systems     Objective:   Physical Exam  There were no vitals taken for this visit. There were no vitals filed for this visit.     Physical Exam  Morbidly obese young  woman, alert and interactive  Mood and behavior appropriate, limited insight      PROCEDURE HISTORY  1. PSG 4/27/2014 at [facility] (BMI 43):  min, AHI 12, REM AHI 41, SpO2 avg 91%    2. PSG 10/18/2016 at [place] [address] (BMI 43.3):  min, AHI 2, REM AHI 10, SpO2 brittanie 88%, T89 22% of TST      PERTINENT LAB RESULTS  TSH   Date Value Ref Range Status   06/15/2020 3.930 0.270 - 4.200 uIU/mL Final      No results found for: FERRITIN     Assessment & Plan:   Scar Bellamy is a 31 y.o. 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 She is at moderate risk for JANELLE based on STOP-BANG score of 3/8 and interval weight gain since last study in 2016. The patient and her mother were both amenable to an in-lab PSG to reassess JANELLE severity and potentially trial CPAP, if AHI >15.    1. Obstructive Sleep Apnea      At moderate risk with STOP-BANG 3/8. Morbid obesity and cranofacial anatomy may both be contributing risk factors. Will proceed with in-lab split-night polysomnography. Order placed today for [address].  Discussed pathophysiology of JANELLE and its impact on daily well-being, as well as cardiometabolic and neurocognitive health (particularly in moderate-severe cases).  Discussed CPAP as first-line and gold-standard therapy for JANELLE should diagnosis be confirmed. Patient understands that CPAP should be worn every night for the duration of the night (in order to not miss therapy during early-morning REM period) for maximum benefit.  Patient does not drive. 2. Excessive Daytime Sleepiness      Patient's mother reports that [place] sleeps/naps regularly and is tired. May be in part secondary to untreated JANELLE, although lack of daytime structure/schedule during lockdown has also contributed (acknowledged by mother).  Will assess for improvement with PAP therapy.       3. Morbid Obesity (There is no height or weight on file to calculate BMI.)      Discussed impact of weight gain on JANELLE severity. Patient understands that JANELLE severity may improve with weight loss but no guarantee of cure can be made. Encouraged weight loss efforts. Patient will follow up within 3 months of completing PSG.       Yogi Morillo MD

## 2020-07-10 ENCOUNTER — TELEPHONE (OUTPATIENT)
Dept: SLEEP CENTER | Age: 30
End: 2020-07-10

## 2020-07-14 ENCOUNTER — HOSPITAL ENCOUNTER (OUTPATIENT)
Dept: ULTRASOUND IMAGING | Age: 30
Discharge: HOME | End: 2020-07-14
Payer: MEDICARE

## 2020-07-14 PROCEDURE — 76705 ECHO EXAM OF ABDOMEN: CPT

## 2020-07-15 ENCOUNTER — OFFICE VISIT (OUTPATIENT)
Dept: PODIATRY | Age: 30
End: 2020-07-15
Payer: MEDICARE

## 2020-07-15 VITALS — WEIGHT: 260 LBS | HEIGHT: 65 IN | BODY MASS INDEX: 43.32 KG/M2

## 2020-07-15 PROCEDURE — 99213 OFFICE O/P EST LOW 20 MIN: CPT | Performed by: PODIATRIST

## 2020-07-15 NOTE — PROGRESS NOTES
7/15/20     Yadira Mk    : 1990   Sex: female    Age: 31 y.o. Patient's PCP/Provider is:  Karel Clement DO    Subjective:  Patient is seen today for follow-up regarding continued care ingrown nail issues bilateral great toes. Patient is still wearing her AFO devices and orthotic devices regarding the clubfoot issues and chronic ankle instability issues. She denies any additional issues at this time. Chief Complaint   Patient presents with    Nail Problem     nail care       ROS:  Const: Positives and pertinent negatives as per HPI. Musculo: Denies symptoms other than stated above. Neuro: Denies symptoms other than stated above. Skin: Denies symptoms other than stated above.     Current Medications:    Current Outpatient Medications:     famotidine (PEPCID) 20 MG tablet, Take 1 tablet by mouth 2 times daily, Disp: 60 tablet, Rfl: 3    albuterol sulfate  (90 Base) MCG/ACT inhaler, Inhale 2 puffs into the lungs every 6 hours as needed for Wheezing or Shortness of Breath, Disp: 1 Inhaler, Rfl: 0    cetirizine (ZYRTEC) 10 MG tablet, TAKE ONE TABLET BY MOUTH DAILY, Disp: 30 tablet, Rfl: 3    levETIRAcetam (KEPPRA) 750 MG tablet, Take 1 tablet by mouth 2 times daily, Disp: 60 tablet, Rfl: 11    vitamin D (CHOLECALCIFEROL) 82622 UNIT CAPS, Take 1 capsule weekly, Disp: 12 capsule, Rfl: 3    calcitRIOL (ROCALTROL) 0.5 MCG capsule, Take 1 capsule by mouth 2 times daily, Disp: 180 capsule, Rfl: 3    calcium carbonate (CALCIUM 600) 600 MG TABS tablet, Take 2 tablet three times a day, Disp: 180 tablet, Rfl: 12    sertraline (ZOLOFT) 100 MG tablet, TAKE 1 & 1/2 TABLETS BY MOUTH EVERY DAY, Disp: 45 tablet, Rfl: 2    lamoTRIgine (LAMICTAL) 25 MG tablet, TAKE 1 TABLET BY MOUTH TWICE A DAY, Disp: 60 tablet, Rfl: 2    Guselkumab (TREMFYA SC), Inject into the skin, Disp: , Rfl:     busPIRone (BUSPAR) 5 MG tablet, TAKE ONE TABLET BY MOUTH TWO TIMES A DAY, Disp: 60 tablet, Rfl: 0   ibuprofen (ADVIL;MOTRIN) 800 MG tablet, TAKE ONE TABLET BY MOUTH EVERY 8 HOURS AS NEEDED FOR PAIN., Disp: 90 tablet, Rfl: 0    clobetasol (TEMOVATE) 0.05 % cream, Apply topically 2 times daily Apply topically 2 times daily. , Disp: 30 g, Rfl: 5    Ciclopirox 1 % SHAM, USE EVERY OTHER DAY AS A SHAMPOO, Disp: 120 mL, Rfl: 5    Clobetasol Propionate Emulsion 0.05 % FOAM, APPLY TO RASH ON SCALP TWICE A DAY AS NEEDED, Disp: , Rfl: 5    Allergies: Allergies   Allergen Reactions    Aspirin Other (See Comments)       Vitals:    07/15/20 1354   Weight: 260 lb (117.9 kg)   Height: 5' 5\" (1.651 m)       Exam:  Neurovascular status unchanged. Tenderness noted to both great toes due to incurvated nail borders present. No signs of infection noted to both great toes. Clubfoot deformity noted bilaterally with mild tenderness lateral ankle regions. Edematous changes noted throughout both lower extremities. Diagnostic Studies:     Us Gallbladder Ruq    Result Date: 7/14/2020  EXAMINATION: RIGHT UPPER QUADRANT ULTRASOUND 7/14/2020 7:42 am COMPARISON: None. HISTORY: ORDERING SYSTEM PROVIDED HISTORY: RUQ pain TECHNOLOGIST PROVIDED HISTORY: Reason for exam:->other FINDINGS: LIVER:  The liver is normal in size with increased echotexture consistent with hepatic steatosis. There is no ductal dilatation or mass. BILIARY SYSTEM: Cholelithiasis present in lumen of the gallbladder. The gallbladder wall is within normal limits. The common bile duct measures 5 mm. RIGHT KIDNEY: The right kidney is unremarkable measuring 11.2 cm. There is no renal mass or hydronephrosis. PANCREAS:  Visualized portions of the pancreas are unremarkable. OTHER: No evidence of right upper quadrant ascites. 1. Cholelithiases without evidence of acute cholecystitis. 2. Hepatic steatosis. Procedures:    None    Plan Per Assessment  Peng was seen today for nail problem.     Diagnoses and all orders for this visit:    OC (onychocryptosis)    Pain of toe of right foot    Pain of toe of left foot    Clubfoot, congenital      1. Evaluation and management  2. Debridement ingrown nails was performed to patient tolerance. No nail matrix procedure performed on today's visit. 3. Patient was advised on continued use of the AFO devices to prevent further instability issues to both lower extremities. Shoe gear recommendations were discussed in detail today again. 4. Patient will be followed up at a later date for continued evaluation and management. Seen By:    Papito Juares DPM    Electronically signed by Papito Juares DPM on 7/15/2020 at 2:05 PM    This note was created using voice recognition software. The note was reviewed however may contain grammatical errors.

## 2020-07-15 NOTE — PROGRESS NOTES
Patient in today for nail care. Patient does not have any complaints of pain at this time.  Patient's PCP is Judith Garvin,  date of last ov 6-          Ethelda Goldberg, LPN

## 2020-07-20 ENCOUNTER — TELEPHONE (OUTPATIENT)
Dept: SURGERY | Age: 30
End: 2020-07-20

## 2020-07-20 ENCOUNTER — OFFICE VISIT (OUTPATIENT)
Dept: SURGERY | Age: 30
End: 2020-07-20
Payer: MEDICARE

## 2020-07-20 VITALS
BODY MASS INDEX: 43.32 KG/M2 | TEMPERATURE: 98.1 F | SYSTOLIC BLOOD PRESSURE: 140 MMHG | HEART RATE: 96 BPM | HEIGHT: 65 IN | WEIGHT: 260 LBS | DIASTOLIC BLOOD PRESSURE: 76 MMHG | OXYGEN SATURATION: 93 %

## 2020-07-20 PROCEDURE — 99204 OFFICE O/P NEW MOD 45 MIN: CPT | Performed by: SURGERY

## 2020-07-20 RX ORDER — SODIUM CHLORIDE 9 MG/ML
INJECTION, SOLUTION INTRAVENOUS CONTINUOUS
Status: CANCELLED | OUTPATIENT
Start: 2020-07-20

## 2020-07-20 RX ORDER — SODIUM CHLORIDE 0.9 % (FLUSH) 0.9 %
10 SYRINGE (ML) INJECTION PRN
Status: CANCELLED | OUTPATIENT
Start: 2020-07-20

## 2020-07-20 RX ORDER — SODIUM CHLORIDE 0.9 % (FLUSH) 0.9 %
10 SYRINGE (ML) INJECTION EVERY 12 HOURS SCHEDULED
Status: CANCELLED | OUTPATIENT
Start: 2020-07-20

## 2020-07-20 NOTE — PROGRESS NOTES
tablet TAKE ONE TABLET BY MOUTH TWO TIMES A DAY 60 tablet 0    ibuprofen (ADVIL;MOTRIN) 800 MG tablet TAKE ONE TABLET BY MOUTH EVERY 8 HOURS AS NEEDED FOR PAIN. 90 tablet 0    clobetasol (TEMOVATE) 0.05 % cream Apply topically 2 times daily Apply topically 2 times daily. 30 g 5    Ciclopirox 1 % SHAM USE EVERY OTHER DAY AS A SHAMPOO 120 mL 5    Clobetasol Propionate Emulsion 0.05 % FOAM APPLY TO RASH ON SCALP TWICE A DAY AS NEEDED  5     No current facility-administered medications for this visit. Allergies   Allergen Reactions    Aspirin Other (See Comments)       The patient has a family history that is negative for severe cardiovascular or respiratory issues, negative for reaction to anesthesia.     Social History     Socioeconomic History    Marital status: Single     Spouse name: Not on file    Number of children: Not on file    Years of education: Not on file    Highest education level: Not on file   Occupational History    Not on file   Social Needs    Financial resource strain: Not on file    Food insecurity     Worry: Not on file     Inability: Not on file    Transportation needs     Medical: Not on file     Non-medical: Not on file   Tobacco Use    Smoking status: Never Smoker    Smokeless tobacco: Never Used   Substance and Sexual Activity    Alcohol use: No     Alcohol/week: 0.0 standard drinks    Drug use: No    Sexual activity: Not Currently   Lifestyle    Physical activity     Days per week: Not on file     Minutes per session: Not on file    Stress: Not on file   Relationships    Social connections     Talks on phone: Not on file     Gets together: Not on file     Attends Synagogue service: Not on file     Active member of club or organization: Not on file     Attends meetings of clubs or organizations: Not on file     Relationship status: Not on file    Intimate partner violence     Fear of current or ex partner: Not on file     Emotionally abused: Not on file Physically abused: Not on file     Forced sexual activity: Not on file   Other Topics Concern    Not on file   Social History Narrative    Not on file         Review of Systems    A complete 10 system review was performed and are otherwise negative unless mentioned in the above HPI. Specific negatives are listed below but may not include all those reviewed. General ROS: negative obtundation, AMS  ENT ROS: negative rhinorrhea, epistaxis  Allergy and Immunology ROS: negative itchy/watery eyes or nasal congestion  Hematological and Lymphatic ROS: negative spontaneous bleeding or bruising  Endocrine ROS: negative  lethargy, mood swings, palpitations or polydipsia/polyuria  Respiratory ROS: negative sputum changes, stridor, tachypnea or wheezing  Cardiovascular ROS: negative for - loss of consciousness, murmur or orthopnea  Gastrointestinal ROS: negative for - hematochezia or hematemesis  Genito-Urinary ROS: negative for -  genital discharge or hematuria  Musculoskeletal ROS: negative for - focal weakness, gangrene  Psych/Neuro ROS: negative for - visual or auditory hallucinations, suicidal ideation    Physical exam:   BP (!) 140/76   Pulse 96   Temp 98.1 °F (36.7 °C) (Oral)   Ht 5' 5\" (1.651 m)   Wt 260 lb (117.9 kg)   SpO2 93%   BMI 43.27 kg/m²   General appearance:  NAD, appears stated age  Head: NCAT, PERRLA, EOMI, red conjunctiva  Neck: supple, no masses, trachea midline  Lungs: Equal chest rise bilateral, no retractions, no wheezing  Heart: Reg rate  Abdomen: soft, tender RUQ  Skin; warm and dry, no cyanosis  Gu: no cva tenderness  Extremities: atraumatic, no focal motor deficits, no open wounds  Psych: No tremor, visual hallucinations        Radiology: I reviewed relevant abdominal imaging from this admission and that available in the EMR including RUQ US. My assessment is   Impression    1. Cholelithiases without evidence of acute cholecystitis. 2. Hepatic steatosis. Assessment:  31 y.o.

## 2020-07-23 ENCOUNTER — TELEPHONE (OUTPATIENT)
Dept: FAMILY MEDICINE CLINIC | Age: 30
End: 2020-07-23

## 2020-07-23 NOTE — TELEPHONE ENCOUNTER
Pt is scheduled for laproscopy cholecystectomy on 8/6. .. getting cardiac clearance with jean on 7/28. .. . she wouldn't need clearance from us as well, would she?

## 2020-07-24 NOTE — TELEPHONE ENCOUNTER
Called and spoke with zeinab's mother. Explained she wouldn't need clearance from us if she is getting cardiac clearance.  Cancelled appt with dr. Parry Show

## 2020-07-28 ENCOUNTER — OFFICE VISIT (OUTPATIENT)
Dept: CARDIOLOGY CLINIC | Age: 30
End: 2020-07-28
Payer: MEDICARE

## 2020-07-28 VITALS
DIASTOLIC BLOOD PRESSURE: 64 MMHG | HEART RATE: 105 BPM | WEIGHT: 273 LBS | RESPIRATION RATE: 16 BRPM | SYSTOLIC BLOOD PRESSURE: 126 MMHG | HEIGHT: 65 IN | BODY MASS INDEX: 45.48 KG/M2

## 2020-07-28 PROCEDURE — 99213 OFFICE O/P EST LOW 20 MIN: CPT | Performed by: INTERNAL MEDICINE

## 2020-07-28 PROCEDURE — 93000 ELECTROCARDIOGRAM COMPLETE: CPT | Performed by: INTERNAL MEDICINE

## 2020-07-28 NOTE — PROGRESS NOTES
[facility] Cardiology Progress Note  Dr. Marylou Sorensen      Referring Physician: Judith Garvin DO  CHIEF COMPLAINT:   Chief Complaint   Patient presents with    Cardiac Clearance       HISTORY OF PRESENT ILLNESS:   Patient is 31year old female with history of DiGeorge syndrome, surgical repair of interrupted aortic arch and closure of ventricular septal defect, here for follow up. Since complaining of right chest pain, shortness of breath is at baseline, denies any lightheadedness or dizziness no palpitations, no pedal edema, no PND, no orthopnea, no syncope, no presyncopal episodes.   Very sedentary    Past Medical History:   Diagnosis Date    Development delay     DiGeorge syndrome (HCC)     Fine motor delay     Obesity     Velocardiofacial syndrome          Past Surgical History:   Procedure Laterality Date    CARDIAC SURGERY           Current Outpatient Medications   Medication Sig Dispense Refill    famotidine (PEPCID) 20 MG tablet Take 1 tablet by mouth 2 times daily 60 tablet 3    albuterol sulfate  (90 Base) MCG/ACT inhaler Inhale 2 puffs into the lungs every 6 hours as needed for Wheezing or Shortness of Breath 1 Inhaler 0    cetirizine (ZYRTEC) 10 MG tablet TAKE ONE TABLET BY MOUTH DAILY 30 tablet 3    levETIRAcetam (KEPPRA) 750 MG tablet Take 1 tablet by mouth 2 times daily 60 tablet 11    vitamin D (CHOLECALCIFEROL) 34917 UNIT CAPS Take 1 capsule weekly 12 capsule 3    calcitRIOL (ROCALTROL) 0.5 MCG capsule Take 1 capsule by mouth 2 times daily 180 capsule 3    calcium carbonate (CALCIUM 600) 600 MG TABS tablet Take 2 tablet three times a day 180 tablet 12    sertraline (ZOLOFT) 100 MG tablet TAKE 1 & 1/2 TABLETS BY MOUTH EVERY DAY 45 tablet 2    lamoTRIgine (LAMICTAL) 25 MG tablet TAKE 1 TABLET BY MOUTH TWICE A DAY 60 tablet 2    busPIRone (BUSPAR) 5 MG tablet TAKE ONE TABLET BY MOUTH TWO TIMES A DAY 60 tablet 0    ibuprofen (ADVIL;MOTRIN) 800 MG tablet TAKE ONE TABLET BY Family History   Problem Relation Age of Onset    Diabetes Mother        REVIEW OF SYSTEMS:     CONSTITUTIONAL:  negative for  fevers, chills, sweats, + fatigue  HEENT:  negative for  tinnitus, earaches, nasal congestion and epistaxis  RESPIRATORY:  negative for  dry cough, cough with sputum, wheezing and hemoptysis  GASTROINTESTINAL:  negative for nausea, vomiting, diarrhea, constipation, pruritus and jaundice  HEMATOLOGIC/LYMPHATIC:  negative for easy bruising, bleeding, lymphadenopathy and petechiae  ENDOCRINE:  negative for heat intolerance, cold intolerance, tremor, hair loss and diabetic symptoms including neither polyuria nor polydipsia nor blurred vision  MUSCULOSKELETAL:  negative for  myalgias, arthralgias, joint swelling, stiff joints and decreased range of motion  NEUROLOGICAL:  negative for memory problems, speech problems, visual disturbance, dysphagia, weakness and numbness      PHYSICAL EXAM:   Constitutional:  Awake, alert cooperative, no apparent distress, and appears stated age. HEENT:  Moist and pink mucous membranes, normocephalic, without obvious abnormality, atraumatic, normal ears and nose. Neck:  Supple, symmetrical, trachea midline, no JVD, no adenopathy, thyroid symmetric, not enlarged and no tenderness, good carotid upstroke bilaterally, no carotid bruit, skin normal  Lungs: No increased work of breathing, mildly decreased air exchange with diffuse scattered wheezing  Cardiovascular: Normal apical impulse, regular rate and rhythm, normal S1 and S2, no S3 or S4, 3/6 systolic murmur at the apex, 3/6 systolic ejection murmur at the right upper sternal border, no pedal edema, good carotid upstroke bilaterally, no carotid bruit, no JVD, no abdominal pulsating masses. Abdomen: Soft, nontender, no hepatomegaly, no splenomegaly, bowel sound positive. CHEST:  Expands symmetrically, nontender to palpation. Musculoskeletal:  No clubbing or cyanosis.   No redness, warmth, or swelling of the joints. Neurological: Alert, awake, and oriented X3. /64   Pulse 105   Resp 16   Ht 5' 5\" (1.651 m)   Wt 273 lb (123.8 kg)   BMI 45.43 kg/m²     DATA:   I personally reviewed the visit EKG with the following interpretation: Sinus tachycardia nonspecific T wave changes    EKG 11/1/18 Normal sinus rhythm  Possible Lateral infarct (cited on or before 01-NOV-2018)  Prolonged QT  Abnormal ECG  When compared with ECG of 01-NOV-2018 15:35,  No significant change was found    ECHO:9/1/2016) Mild concentric left ventricular hypertrophy. Ejection fraction is visually estimated at 50%. No regional wall motion abnormalities seen. Normal left ventricular diastolic filling pattern for age. Right ventricle global systolic function is mildly reduced. Mild aortic stenosis is present. Phsiologic and/or trace mitral regurgitation is present. Mild to moderate tricuspid regurgitation.     Cardiology Labs: BMP:    Lab Results   Component Value Date     06/15/2020    K 3.9 06/15/2020     06/15/2020    CO2 28 06/15/2020    BUN 12 06/15/2020    CREATININE 0.6 06/15/2020     CMP:    Lab Results   Component Value Date     06/15/2020    K 3.9 06/15/2020     06/15/2020    CO2 28 06/15/2020    BUN 12 06/15/2020    CREATININE 0.6 06/15/2020    PROT 8.2 06/15/2020     CBC:    Lab Results   Component Value Date    WBC 10.2 06/15/2020    RBC 4.65 06/15/2020    HGB 14.2 06/15/2020    HCT 42.2 06/15/2020    MCV 90.8 06/15/2020    RDW 13.2 06/15/2020     06/15/2020     PT/INR:  No results found for: PTINR  PT/INR Warfarin:  No components found for: PTPATWAR, PTINRWAR  PTT:  No results found for: APTT  PTT Heparin:  No components found for: APTTHEP  Magnesium:    Lab Results   Component Value Date    MG 1.5 11/01/2018     TSH:    Lab Results   Component Value Date    TSH 3.930 06/15/2020     TROPONIN:  No components found for: TROP  BNP:  No results found for: BNP  FASTING LIPID PANEL:  No results found for: CHOL, HDL, TRIG  No orders to display     I have personally reviewed the laboratory, cardiac diagnostic and radiographic testing as outlined above:      IMPRESSION:  1. Di Alban's syndrome: Status post surgical repair of interrupted aortic arch and VSD repair, echocardiogram showed mild aortic valve stenosis, intact ventriculoseptal defect repair, mild-to-moderate TR, no significant changes when compared to previous. 2.  Chest pain: Noncardiac, patient was reassured  3. Shortness of breath: Secondary to asthma and deconditioning    4. Nonrheumatic aortic (valve) stenosis :  Mild by most recent echo   5. Nonrheumatic tricuspid (valve) insufficiency  :  Mild to moderate mitral most recent echo   6. Abnormal electrocardiogram EKG: Chronic changes    RECOMMENDATIONS:   1. Patient is at acceptable risk for perioperative cardiovascular event for the planned procedure (cholecystectomy), patient may proceed without any further cardiac testing. Please feel free to call for any further information  2. Increase ambulation as tolerated  3. Continue current treatment  4. Follow-up with Dr. Armando Bishop as scheduled  5. Follow-up with Dr. Nette Finley in 1 year, sooner if symptomatic for any reason    I have reviewed my findings and recommendations with patient    Electronically signed by Bernardo Palomino MD on 7/28/2020 at 2:43 PM    NOTE: This report was transcribed using voice recognition software.  Every effort was made to ensure accuracy; however, inadvertent computerized transcription errors may be present

## 2020-07-30 NOTE — TELEPHONE ENCOUNTER
Patricia Del Rio did get cardiac clearance from Dr. Danny Acevedo, however pre-testing still requires something signed by you stating she is cleared by you as well for surgery. I spoke with Vidhi Lopes at Dr. Orvilla Heimlich office who said you can sign her clearance form and check yes or no for clearance and that will be OK. Do you want to just sign the paper since she was cleared by cardiology or do you want her to still come in and get a clearance from you?

## 2020-07-30 NOTE — PROGRESS NOTES
Dr Kenya Peres office called regarding medical clearance from dr Luis F Ferro. .. notes in computer-- they will call office regarding notes

## 2020-07-31 ENCOUNTER — HOSPITAL ENCOUNTER (OUTPATIENT)
Age: 30
Discharge: HOME | End: 2020-08-02
Payer: MEDICARE

## 2020-07-31 ENCOUNTER — HOSPITAL ENCOUNTER (OUTPATIENT)
Dept: PREADMISSION TESTING | Age: 30
Discharge: HOME | End: 2020-07-31
Payer: MEDICARE

## 2020-07-31 VITALS
WEIGHT: 269 LBS | OXYGEN SATURATION: 95 % | BODY MASS INDEX: 44.82 KG/M2 | SYSTOLIC BLOOD PRESSURE: 147 MMHG | HEIGHT: 65 IN | DIASTOLIC BLOOD PRESSURE: 98 MMHG | HEART RATE: 90 BPM | RESPIRATION RATE: 20 BRPM | TEMPERATURE: 98.8 F

## 2020-07-31 LAB
ANION GAP SERPL CALCULATED.3IONS-SCNC: 14 MMOL/L (ref 7–16)
BUN BLDV-MCNC: 10 MG/DL (ref 6–20)
CALCIUM SERPL-MCNC: 9.1 MG/DL (ref 8.6–10.2)
CHLORIDE BLD-SCNC: 97 MMOL/L (ref 98–107)
CO2: 27 MMOL/L (ref 22–29)
CREAT SERPL-MCNC: 0.7 MG/DL (ref 0.5–1)
GFR AFRICAN AMERICAN: >60
GFR NON-AFRICAN AMERICAN: >60 ML/MIN/1.73
GLUCOSE BLD-MCNC: 138 MG/DL (ref 74–99)
HCT VFR BLD CALC: 45.1 % (ref 34–48)
HEMOGLOBIN: 14.1 G/DL (ref 11.5–15.5)
MCH RBC QN AUTO: 29.6 PG (ref 26–35)
MCHC RBC AUTO-ENTMCNC: 31.3 % (ref 32–34.5)
MCV RBC AUTO: 94.7 FL (ref 80–99.9)
PDW BLD-RTO: 13 FL (ref 11.5–15)
PLATELET # BLD: 133 E9/L (ref 130–450)
PMV BLD AUTO: 12.3 FL (ref 7–12)
POTASSIUM SERPL-SCNC: 4 MMOL/L (ref 3.5–5)
RBC # BLD: 4.76 E12/L (ref 3.5–5.5)
SODIUM BLD-SCNC: 138 MMOL/L (ref 132–146)
WBC # BLD: 10.9 E9/L (ref 4.5–11.5)

## 2020-07-31 PROCEDURE — 80048 BASIC METABOLIC PNL TOTAL CA: CPT

## 2020-07-31 PROCEDURE — 36415 COLL VENOUS BLD VENIPUNCTURE: CPT

## 2020-07-31 PROCEDURE — 85027 COMPLETE CBC AUTOMATED: CPT

## 2020-07-31 PROCEDURE — U0003 INFECTIOUS AGENT DETECTION BY NUCLEIC ACID (DNA OR RNA); SEVERE ACUTE RESPIRATORY SYNDROME CORONAVIRUS 2 (SARS-COV-2) (CORONAVIRUS DISEASE [COVID-19]), AMPLIFIED PROBE TECHNIQUE, MAKING USE OF HIGH THROUGHPUT TECHNOLOGIES AS DESCRIBED BY CMS-2020-01-R: HCPCS

## 2020-07-31 ASSESSMENT — PAIN DESCRIPTION - ORIENTATION: ORIENTATION: RIGHT

## 2020-07-31 ASSESSMENT — PAIN DESCRIPTION - PROGRESSION: CLINICAL_PROGRESSION: NOT CHANGED

## 2020-07-31 ASSESSMENT — PAIN DESCRIPTION - DESCRIPTORS: DESCRIPTORS: STABBING

## 2020-07-31 ASSESSMENT — PAIN SCALES - GENERAL: PAINLEVEL_OUTOF10: 9

## 2020-07-31 ASSESSMENT — PAIN DESCRIPTION - FREQUENCY: FREQUENCY: INTERMITTENT

## 2020-07-31 ASSESSMENT — PAIN DESCRIPTION - LOCATION: LOCATION: ABDOMEN

## 2020-07-31 ASSESSMENT — PAIN DESCRIPTION - PAIN TYPE: TYPE: ACUTE PAIN

## 2020-07-31 ASSESSMENT — PAIN DESCRIPTION - ONSET: ONSET: ON-GOING

## 2020-07-31 NOTE — PROGRESS NOTES
[facility]                                                                                                                     PRE OP INSTRUCTIONS FOR  [place]        Date: 7/31/2020    Date and time of surgery: 8/6/2020 Arrival Time: will call with time    1. Do not eat or drink anything after 12 midnight prior to surgery. This includes no water, chewing gum, mints or ice chips. 2. Take the following pills with a small sip of water on the morning of Surgery: keppra buspar zoloft lamital     3. Diabetics may take evening dose of insulin but none after midnight. If you feel symptomatic or low blood sugar take 1-2 ounces of apple juice only. 4. Aspirin, Ibuprofen, Advil, Naproxen, Vitamin E and other Anti-inflammatory products should be stopped  before surgery  as directed by your physician. 5. Check with your Doctor regarding stopping Plavix, Coumadin, Lovenox, Fragmin or other blood thinners. 6. Do not smoke,use illicit drugs and do not drink any alcoholic beverages 24 hours prior to surgery. 7. You may brush your teeth and gargle the morning of surgery. DO NOT SWALLOW WATER    8. You MUST make arrangements for a responsible adult to take you home after your surgery. You will not be allowed to leave alone or drive yourself home. It is strongly suggested someone stay with you the first 24 hrs. Your surgery will be cancelled if you do not have a ride home. 9. A parent/legal guardian must accompany a child scheduled for surgery and plan to stay at the hospital until the child is discharged. Please do not bring other children with you. 10. Please wear simple, loose fitting clothing to the hospital.  Shiv Dose not bring valuables (money, credit cards, checkbooks, etc.) Do not wear any makeup (including no eye makeup) or nail polish on your fingers or toes. 11. DO NOT wear any jewelry or piercings on day of surgery. All body piercing jewelry must be removed. 12.  Shower the night before surgery with ___Antibacterial soap ___Hibiclens. 15. Remember to bring Blood Bank bracelet to the hospital on the day of surgery. 14. If you have a Living Will and Durable Power of  for Healthcare, please bring in a copy. 15. If appropriate bring crutches, inspirex, etc... 12. Notify your Surgeon if you develop any illness between now and surgery time, cough, cold, fever, sore throat, nausea, vomiting, etc.  Please notify your surgeon if you experience dizziness, shortness of breath or blurred vision between now & the time of your surgery. 17. If you have ___dentures, they will be removed before going to the OR; we will provide you a container. If you wear ___contact lenses or ___glasses, they will be removed; please bring a case for them. 18. To provide excellent care visitors will be limited to one in the room at any given time. 19. Please bring picture ID and insurance card. 20. Sleep apnea patients need to bring CPAP to hospital on day of surgery. 21. Visit our web site for additional information: [email or web address]    22. During flu season no children under the age of 15 are permitted in the hospital for the safety of all patients. 23. Other  Come front lobby wear mask  Take temp                Please call pre admission testing if you have any further questions.    [address] [facility]     [address]

## 2020-08-02 LAB
SARS-COV-2: NOT DETECTED
SOURCE: NORMAL

## 2020-08-05 ENCOUNTER — ANESTHESIA EVENT (OUTPATIENT)
Dept: OPERATING ROOM | Age: 30
End: 2020-08-05
Payer: MEDICARE

## 2020-08-06 ENCOUNTER — HOSPITAL ENCOUNTER (OUTPATIENT)
Dept: GENERAL RADIOLOGY | Age: 30
Setting detail: OUTPATIENT SURGERY
Discharge: HOME | End: 2020-08-08
Attending: SURGERY
Payer: MEDICARE

## 2020-08-06 ENCOUNTER — HOSPITAL ENCOUNTER (OUTPATIENT)
Age: 30
Setting detail: OUTPATIENT SURGERY
Discharge: HOME | End: 2020-08-06
Attending: SURGERY | Admitting: SURGERY
Payer: MEDICARE

## 2020-08-06 ENCOUNTER — ANESTHESIA (OUTPATIENT)
Dept: OPERATING ROOM | Age: 30
End: 2020-08-06
Payer: MEDICARE

## 2020-08-06 VITALS
WEIGHT: 268 LBS | TEMPERATURE: 96.4 F | BODY MASS INDEX: 44.65 KG/M2 | RESPIRATION RATE: 20 BRPM | DIASTOLIC BLOOD PRESSURE: 71 MMHG | HEART RATE: 113 BPM | SYSTOLIC BLOOD PRESSURE: 131 MMHG | OXYGEN SATURATION: 93 % | HEIGHT: 65 IN

## 2020-08-06 VITALS
RESPIRATION RATE: 3 BRPM | DIASTOLIC BLOOD PRESSURE: 99 MMHG | SYSTOLIC BLOOD PRESSURE: 128 MMHG | OXYGEN SATURATION: 95 %

## 2020-08-06 LAB — HCG(URINE) PREGNANCY TEST: NEGATIVE

## 2020-08-06 PROCEDURE — 2580000003 HC RX 258: Performed by: NURSE ANESTHETIST, CERTIFIED REGISTERED

## 2020-08-06 PROCEDURE — 6360000002 HC RX W HCPCS: Performed by: NURSE ANESTHETIST, CERTIFIED REGISTERED

## 2020-08-06 PROCEDURE — 3600000004 HC SURGERY LEVEL 4 BASE: Performed by: SURGERY

## 2020-08-06 PROCEDURE — 2500000003 HC RX 250 WO HCPCS: Performed by: SURGERY

## 2020-08-06 PROCEDURE — 74300 X-RAY BILE DUCTS/PANCREAS: CPT

## 2020-08-06 PROCEDURE — 7100000011 HC PHASE II RECOVERY - ADDTL 15 MIN: Performed by: SURGERY

## 2020-08-06 PROCEDURE — 6360000002 HC RX W HCPCS: Performed by: ANESTHESIOLOGY

## 2020-08-06 PROCEDURE — 7100000000 HC PACU RECOVERY - FIRST 15 MIN: Performed by: SURGERY

## 2020-08-06 PROCEDURE — 6360000002 HC RX W HCPCS: Performed by: SURGERY

## 2020-08-06 PROCEDURE — 3700000001 HC ADD 15 MINUTES (ANESTHESIA): Performed by: SURGERY

## 2020-08-06 PROCEDURE — 7100000001 HC PACU RECOVERY - ADDTL 15 MIN: Performed by: SURGERY

## 2020-08-06 PROCEDURE — 7100000010 HC PHASE II RECOVERY - FIRST 15 MIN: Performed by: SURGERY

## 2020-08-06 PROCEDURE — 81025 URINE PREGNANCY TEST: CPT

## 2020-08-06 PROCEDURE — 88304 TISSUE EXAM BY PATHOLOGIST: CPT

## 2020-08-06 PROCEDURE — C1894 INTRO/SHEATH, NON-LASER: HCPCS | Performed by: SURGERY

## 2020-08-06 PROCEDURE — 3600000014 HC SURGERY LEVEL 4 ADDTL 15MIN: Performed by: SURGERY

## 2020-08-06 PROCEDURE — 6370000000 HC RX 637 (ALT 250 FOR IP): Performed by: SURGERY

## 2020-08-06 PROCEDURE — 3700000000 HC ANESTHESIA ATTENDED CARE: Performed by: SURGERY

## 2020-08-06 PROCEDURE — 6360000004 HC RX CONTRAST MEDICATION: Performed by: SURGERY

## 2020-08-06 PROCEDURE — 2500000003 HC RX 250 WO HCPCS: Performed by: NURSE ANESTHETIST, CERTIFIED REGISTERED

## 2020-08-06 PROCEDURE — 47563 LAPARO CHOLECYSTECTOMY/GRAPH: CPT | Performed by: SURGERY

## 2020-08-06 PROCEDURE — 2709999900 HC NON-CHARGEABLE SUPPLY: Performed by: SURGERY

## 2020-08-06 RX ORDER — LABETALOL HYDROCHLORIDE 5 MG/ML
5 INJECTION, SOLUTION INTRAVENOUS EVERY 10 MIN PRN
Status: DISCONTINUED | OUTPATIENT
Start: 2020-08-06 | End: 2020-08-06 | Stop reason: HOSPADM

## 2020-08-06 RX ORDER — MIDAZOLAM HYDROCHLORIDE 1 MG/ML
INJECTION INTRAMUSCULAR; INTRAVENOUS PRN
Status: DISCONTINUED | OUTPATIENT
Start: 2020-08-06 | End: 2020-08-06 | Stop reason: SDUPTHER

## 2020-08-06 RX ORDER — SODIUM CHLORIDE 0.9 % (FLUSH) 0.9 %
10 SYRINGE (ML) INJECTION EVERY 12 HOURS SCHEDULED
Status: DISCONTINUED | OUTPATIENT
Start: 2020-08-06 | End: 2020-08-06 | Stop reason: HOSPADM

## 2020-08-06 RX ORDER — BUPIVACAINE HYDROCHLORIDE AND EPINEPHRINE 2.5; 5 MG/ML; UG/ML
INJECTION, SOLUTION EPIDURAL; INFILTRATION; INTRACAUDAL; PERINEURAL PRN
Status: DISCONTINUED | OUTPATIENT
Start: 2020-08-06 | End: 2020-08-06 | Stop reason: ALTCHOICE

## 2020-08-06 RX ORDER — LIDOCAINE HYDROCHLORIDE 20 MG/ML
INJECTION, SOLUTION INTRAVENOUS PRN
Status: DISCONTINUED | OUTPATIENT
Start: 2020-08-06 | End: 2020-08-06 | Stop reason: SDUPTHER

## 2020-08-06 RX ORDER — DEXAMETHASONE SODIUM PHOSPHATE 10 MG/ML
INJECTION, SOLUTION INTRAMUSCULAR; INTRAVENOUS PRN
Status: DISCONTINUED | OUTPATIENT
Start: 2020-08-06 | End: 2020-08-06 | Stop reason: SDUPTHER

## 2020-08-06 RX ORDER — SODIUM CHLORIDE 0.9 % (FLUSH) 0.9 %
10 SYRINGE (ML) INJECTION PRN
Status: DISCONTINUED | OUTPATIENT
Start: 2020-08-06 | End: 2020-08-06 | Stop reason: HOSPADM

## 2020-08-06 RX ORDER — SODIUM CHLORIDE 9 MG/ML
INJECTION, SOLUTION INTRAVENOUS CONTINUOUS
Status: DISCONTINUED | OUTPATIENT
Start: 2020-08-06 | End: 2020-08-06 | Stop reason: HOSPADM

## 2020-08-06 RX ORDER — ONDANSETRON 2 MG/ML
INJECTION INTRAMUSCULAR; INTRAVENOUS PRN
Status: DISCONTINUED | OUTPATIENT
Start: 2020-08-06 | End: 2020-08-06 | Stop reason: SDUPTHER

## 2020-08-06 RX ORDER — ONDANSETRON 4 MG/1
4 TABLET, ORALLY DISINTEGRATING ORAL EVERY 8 HOURS PRN
Qty: 20 TABLET | Refills: 1 | Status: SHIPPED
Start: 2020-08-06 | End: 2020-09-16 | Stop reason: SDUPTHER

## 2020-08-06 RX ORDER — ROCURONIUM BROMIDE 10 MG/ML
INJECTION, SOLUTION INTRAVENOUS PRN
Status: DISCONTINUED | OUTPATIENT
Start: 2020-08-06 | End: 2020-08-06 | Stop reason: SDUPTHER

## 2020-08-06 RX ORDER — MEPERIDINE HYDROCHLORIDE 25 MG/ML
INJECTION INTRAMUSCULAR; INTRAVENOUS; SUBCUTANEOUS
Status: DISCONTINUED
Start: 2020-08-06 | End: 2020-08-06 | Stop reason: HOSPADM

## 2020-08-06 RX ORDER — MEPERIDINE HYDROCHLORIDE 25 MG/ML
12.5 INJECTION INTRAMUSCULAR; INTRAVENOUS; SUBCUTANEOUS EVERY 5 MIN PRN
Status: DISCONTINUED | OUTPATIENT
Start: 2020-08-06 | End: 2020-08-06 | Stop reason: HOSPADM

## 2020-08-06 RX ORDER — OXYCODONE HYDROCHLORIDE AND ACETAMINOPHEN 5; 325 MG/1; MG/1
1 TABLET ORAL EVERY 6 HOURS PRN
Qty: 18 TABLET | Refills: 0 | Status: SHIPPED | OUTPATIENT
Start: 2020-08-06 | End: 2020-08-11

## 2020-08-06 RX ORDER — PROPOFOL 10 MG/ML
INJECTION, EMULSION INTRAVENOUS PRN
Status: DISCONTINUED | OUTPATIENT
Start: 2020-08-06 | End: 2020-08-06 | Stop reason: SDUPTHER

## 2020-08-06 RX ORDER — SODIUM CHLORIDE 9 MG/ML
INJECTION, SOLUTION INTRAVENOUS CONTINUOUS PRN
Status: DISCONTINUED | OUTPATIENT
Start: 2020-08-06 | End: 2020-08-06 | Stop reason: SDUPTHER

## 2020-08-06 RX ORDER — FENTANYL CITRATE 50 UG/ML
INJECTION, SOLUTION INTRAMUSCULAR; INTRAVENOUS PRN
Status: DISCONTINUED | OUTPATIENT
Start: 2020-08-06 | End: 2020-08-06 | Stop reason: SDUPTHER

## 2020-08-06 RX ORDER — CEFAZOLIN SODIUM 2 G/50ML
2 SOLUTION INTRAVENOUS
Status: COMPLETED | OUTPATIENT
Start: 2020-08-06 | End: 2020-08-06

## 2020-08-06 RX ORDER — PROMETHAZINE HYDROCHLORIDE 25 MG/ML
25 INJECTION, SOLUTION INTRAMUSCULAR; INTRAVENOUS PRN
Status: DISCONTINUED | OUTPATIENT
Start: 2020-08-06 | End: 2020-08-06 | Stop reason: HOSPADM

## 2020-08-06 RX ADMIN — MEPERIDINE HYDROCHLORIDE 12.5 MG: 25 INJECTION INTRAMUSCULAR; INTRAVENOUS; SUBCUTANEOUS at 11:30

## 2020-08-06 RX ADMIN — FENTANYL CITRATE 50 MCG: 50 INJECTION, SOLUTION INTRAMUSCULAR; INTRAVENOUS at 10:57

## 2020-08-06 RX ADMIN — HYDROMORPHONE HYDROCHLORIDE 0.25 MG: 1 INJECTION, SOLUTION INTRAMUSCULAR; INTRAVENOUS; SUBCUTANEOUS at 12:20

## 2020-08-06 RX ADMIN — DEXAMETHASONE SODIUM PHOSPHATE 10 MG: 10 INJECTION, SOLUTION INTRAMUSCULAR; INTRAVENOUS at 10:38

## 2020-08-06 RX ADMIN — PROPOFOL 150 MG: 10 INJECTION, EMULSION INTRAVENOUS at 10:38

## 2020-08-06 RX ADMIN — CEFAZOLIN SODIUM 2 G: 2 SOLUTION INTRAVENOUS at 10:46

## 2020-08-06 RX ADMIN — HYDROMORPHONE HYDROCHLORIDE 0.25 MG: 1 INJECTION, SOLUTION INTRAMUSCULAR; INTRAVENOUS; SUBCUTANEOUS at 12:10

## 2020-08-06 RX ADMIN — ROCURONIUM BROMIDE 50 MG: 10 SOLUTION INTRAVENOUS at 10:38

## 2020-08-06 RX ADMIN — MEPERIDINE HYDROCHLORIDE 12.5 MG: 25 INJECTION INTRAMUSCULAR; INTRAVENOUS; SUBCUTANEOUS at 11:35

## 2020-08-06 RX ADMIN — SODIUM CHLORIDE: 9 INJECTION, SOLUTION INTRAVENOUS at 10:31

## 2020-08-06 RX ADMIN — PHENYLEPHRINE HYDROCHLORIDE 100 MCG: 10 INJECTION INTRAVENOUS at 10:53

## 2020-08-06 RX ADMIN — ONDANSETRON 4 MG: 2 INJECTION INTRAMUSCULAR; INTRAVENOUS at 11:09

## 2020-08-06 RX ADMIN — MIDAZOLAM 2 MG: 1 INJECTION INTRAMUSCULAR; INTRAVENOUS at 10:31

## 2020-08-06 RX ADMIN — SUGAMMADEX 500 MG: 100 INJECTION, SOLUTION INTRAVENOUS at 11:13

## 2020-08-06 RX ADMIN — FENTANYL CITRATE 50 MCG: 50 INJECTION, SOLUTION INTRAMUSCULAR; INTRAVENOUS at 10:38

## 2020-08-06 RX ADMIN — LIDOCAINE HYDROCHLORIDE 100 MG: 20 INJECTION, SOLUTION INTRAVENOUS at 10:38

## 2020-08-06 ASSESSMENT — PULMONARY FUNCTION TESTS
PIF_VALUE: 1
PIF_VALUE: 0
PIF_VALUE: 1
PIF_VALUE: 35
PIF_VALUE: 35
PIF_VALUE: 0
PIF_VALUE: 35
PIF_VALUE: 30
PIF_VALUE: 29
PIF_VALUE: 29
PIF_VALUE: 11
PIF_VALUE: 33
PIF_VALUE: 30
PIF_VALUE: 33
PIF_VALUE: 2
PIF_VALUE: 3
PIF_VALUE: 1
PIF_VALUE: 28
PIF_VALUE: 30
PIF_VALUE: 27
PIF_VALUE: 30
PIF_VALUE: 30
PIF_VALUE: 31
PIF_VALUE: 33
PIF_VALUE: 1
PIF_VALUE: 27
PIF_VALUE: 35
PIF_VALUE: 5
PIF_VALUE: 0
PIF_VALUE: 31
PIF_VALUE: 25
PIF_VALUE: 2
PIF_VALUE: 5
PIF_VALUE: 35
PIF_VALUE: 2
PIF_VALUE: 41
PIF_VALUE: 32
PIF_VALUE: 12
PIF_VALUE: 35
PIF_VALUE: 28
PIF_VALUE: 24
PIF_VALUE: 29
PIF_VALUE: 32
PIF_VALUE: 1
PIF_VALUE: 0
PIF_VALUE: 30
PIF_VALUE: 27
PIF_VALUE: 2
PIF_VALUE: 27
PIF_VALUE: 1
PIF_VALUE: 25
PIF_VALUE: 1

## 2020-08-06 ASSESSMENT — PAIN DESCRIPTION - PROGRESSION
CLINICAL_PROGRESSION: GRADUALLY IMPROVING
CLINICAL_PROGRESSION: GRADUALLY IMPROVING
CLINICAL_PROGRESSION: NOT CHANGED
CLINICAL_PROGRESSION: GRADUALLY IMPROVING

## 2020-08-06 ASSESSMENT — PAIN DESCRIPTION - FREQUENCY
FREQUENCY: CONTINUOUS
FREQUENCY: CONTINUOUS
FREQUENCY: INTERMITTENT
FREQUENCY: CONTINUOUS

## 2020-08-06 ASSESSMENT — PAIN DESCRIPTION - ONSET
ONSET: AWAKENED FROM SLEEP
ONSET: GRADUAL
ONSET: GRADUAL
ONSET: ON-GOING

## 2020-08-06 ASSESSMENT — PAIN DESCRIPTION - DESCRIPTORS
DESCRIPTORS: ACHING
DESCRIPTORS: ACHING
DESCRIPTORS: THROBBING
DESCRIPTORS: ACHING;TIGHTNESS
DESCRIPTORS: ACHING

## 2020-08-06 ASSESSMENT — PAIN DESCRIPTION - PAIN TYPE
TYPE: ACUTE PAIN
TYPE: SURGICAL PAIN
TYPE: SURGICAL PAIN

## 2020-08-06 ASSESSMENT — PAIN SCALES - GENERAL
PAINLEVEL_OUTOF10: 1
PAINLEVEL_OUTOF10: 5
PAINLEVEL_OUTOF10: 0
PAINLEVEL_OUTOF10: 0
PAINLEVEL_OUTOF10: 6
PAINLEVEL_OUTOF10: 10
PAINLEVEL_OUTOF10: 0
PAINLEVEL_OUTOF10: 0
PAINLEVEL_OUTOF10: 3

## 2020-08-06 ASSESSMENT — PAIN DESCRIPTION - ORIENTATION
ORIENTATION: RIGHT
ORIENTATION: MID

## 2020-08-06 ASSESSMENT — PAIN DESCRIPTION - LOCATION
LOCATION: ABDOMEN

## 2020-08-06 ASSESSMENT — PAIN - FUNCTIONAL ASSESSMENT: PAIN_FUNCTIONAL_ASSESSMENT: 0-10

## 2020-08-06 NOTE — ANESTHESIA PRE PROCEDURE
Department of Anesthesiology  Preprocedure Note       Name:  Heather Beauchamp   Age:  31 y.o.  :  1990                                          MRN:  [id number]         Date:  2020      Surgeon: Lasha Grajeda):  Dimas Ricks MD    Procedure: Procedure(s):  CHOLECYSTECTOMY LAPAROSCOPIC WITH INTRAOPERATIVE CHOLANGIOGRAM    Medications prior to admission:   Prior to Admission medications    Medication Sig Start Date End Date Taking? Authorizing Provider   famotidine (PEPCID) 20 MG tablet Take 1 tablet by mouth 2 times daily 20   TWIN Sigala CNP   albuterol sulfate  (90 Base) MCG/ACT inhaler Inhale 2 puffs into the lungs every 6 hours as needed for Wheezing or Shortness of Breath 20   TWIN Sigala CNP   cetirizine (ZYRTEC) 10 MG tablet TAKE ONE TABLET BY MOUTH DAILY 20   TWIN Sigala CNP   levETIRAcetam (KEPPRA) 750 MG tablet Take 1 tablet by mouth 2 times daily 20   TWIN Beard   vitamin D (CHOLECALCIFEROL) 94339 UNIT CAPS Take 1 capsule weekly 19   Ari Cardona MD   calcitRIOL (ROCALTROL) 0.5 MCG capsule Take 1 capsule by mouth 2 times daily 19   Ari Cardona MD   calcium carbonate (CALCIUM 600) 600 MG TABS tablet Take 2 tablet three times a day 19   Ari Cardona MD   sertraline (ZOLOFT) 100 MG tablet TAKE 1 & 1/2 TABLETS BY MOUTH EVERY DAY 19   TWIN Sigala CNP   lamoTRIgine (LAMICTAL) 25 MG tablet TAKE 1 TABLET BY MOUTH TWICE A DAY 19   TWIN Sigala CNP   Guselkumab (TREMFYA SC) Inject into the skin    Historical Provider, MD   busPIRone (BUSPAR) 5 MG tablet TAKE ONE TABLET BY MOUTH TWO TIMES A DAY 19   TWIN Harrell - CNP   ibuprofen (ADVIL;MOTRIN) 800 MG tablet TAKE ONE TABLET BY MOUTH EVERY 8 HOURS AS NEEDED FOR PAIN. 3/7/19   Rashida Mccullough DO   clobetasol (TEMOVATE) 0.05 % cream Apply topically 2 times daily Apply topically 2 times daily. 1/23/18   TWIN Cantu CNP   Ciclopirox 1 % SHAM USE EVERY OTHER DAY AS A SHAMPOO 11/28/17   TWIN Cantu CNP   Clobetasol Propionate Emulsion 0.05 % FOAM APPLY TO RASH ON SCALP TWICE A DAY AS NEEDED 3/22/17   Historical Provider, MD       Current medications:    Current Facility-Administered Medications   Medication Dose Route Frequency Provider Last Rate Last Dose    0.9 % sodium chloride infusion   Intravenous Continuous Angelita Quintanilla MD        ceFAZolin (ANCEF) 2 g in dextrose 3 % 50 mL IVPB (duplex)  2 g Intravenous On Call to Rubi Shah MD        sodium chloride flush 0.9 % injection 10 mL  10 mL Intravenous 2 times per day Angelita Quintanilla MD        sodium chloride flush 0.9 % injection 10 mL  10 mL Intravenous PRN Angelita Quintanilla MD           Allergies: Allergies   Allergen Reactions    Aspirin Other (See Comments)       Problem List:    Patient Active Problem List   Diagnosis Code    Gastroesophageal reflux disease without esophagitis K21.9    Psoriasis L40.9    Seizure ([address] R56.9    Anxiety and depression F41.9, F32.9    Velocardiofacial syndrome Q93.81    DiGeorge syndrome ([address] D82.1    Hypocalcemia E83.51    Clubfoot, congenital Q66.89    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    Right ankle instability M25.371    Difficulty walking R26.2    OC (onychocryptosis) L60.0       Past Medical History:        Diagnosis Date    Development delay     DiGeorge syndrome ([address]     Fine motor delay     Obesity     Velocardiofacial syndrome        Past Surgical History:        Procedure Laterality Date    CARDIAC SURGERY         Social History:    Social History     Tobacco Use    Smoking status: Never Smoker    Smokeless tobacco: Never Used   Substance Use Topics    Alcohol use: No     Alcohol/week: 0.0 standard drinks                                Counseling given: Not Answered      Vital Signs (Current): There were no vitals filed for this visit. BP Readings from Last 3 Encounters:   07/31/20 (!) 147/98   07/28/20 126/64   07/20/20 (!) 140/76       NPO Status:                                                                                 BMI:   Wt Readings from Last 3 Encounters:   07/31/20 269 lb (122 kg)   07/28/20 273 lb (123.8 kg)   07/20/20 260 lb (117.9 kg)     There is no height or weight on file to calculate BMI.    CBC:   Lab Results   Component Value Date    WBC 10.9 07/31/2020    RBC 4.76 07/31/2020    HGB 14.1 07/31/2020    HCT 45.1 07/31/2020    MCV 94.7 07/31/2020    RDW 13.0 07/31/2020     07/31/2020       CMP:   Lab Results   Component Value Date     07/31/2020    K 4.0 07/31/2020    CL 97 07/31/2020    CO2 27 07/31/2020    BUN 10 07/31/2020    CREATININE 0.7 07/31/2020    GFRAA >60 07/31/2020    LABGLOM >60 07/31/2020    GLUCOSE 138 07/31/2020    PROT 8.2 06/15/2020    CALCIUM 9.1 07/31/2020    BILITOT 0.5 06/15/2020    ALKPHOS 42 06/15/2020    AST 76 06/15/2020    ALT 75 06/15/2020       POC Tests: No results for input(s): POCGLU, POCNA, POCK, POCCL, POCBUN, POCHEMO, POCHCT in the last 72 hours.     Coags: No results found for: PROTIME, INR, APTT    HCG (If Applicable):   Lab Results   Component Value Date    PREGTESTUR negative 03/27/2016        ABGs: No results found for: PHART, PO2ART, SMW1WCX, JVT0GTA, BEART, D5FWKDYA     Type & Screen (If Applicable):  No results found for: LABABO, LABRH    Drug/Infectious Status (If Applicable):  No results found for: HIV, HEPCAB    COVID-19 Screening (If Applicable):   Lab Results   Component Value Date    COVID19 Not Detected 07/31/2020         Anesthesia Evaluation  Patient summary reviewed no history of anesthetic complications:   Airway: Mallampati: III  TM distance: >3 FB   Neck ROM: full  Mouth opening: > = 3 FB Dental: normal exam         Pulmonary: breath sounds clear to auscultation  (+) asthma (Last used inhaler 1 yr ago): exercise-induced asthma,                           ROS comment: COVID Neg 7/31/2020   Cardiovascular:          ECG reviewed  Rhythm: regular  Rate: normal  Echocardiogram reviewed    Cleared by cardiology           ROS comment: TTE 9/1/16  Summary   No previous echo for comparison. Technically limited study. Mild concentric left ventricular hypertrophy   Ejection fraction is visually estimated at 50%. No regional wall motion abnormalities seen. Normal left ventricular diastolic filling pattern for age. Right ventricle global systolic function is mildly reduced . mild aortic stenosis is present. Physiologic and/or trace mitral regurgitation is present. Mild to moderate tricuspid regurgitation. RVSP is 43 mmHg. Heart Surgery as an infant 30 yrs ago. Cardiology evaluation:  IMPRESSION:  1. Di Alban's syndrome: Status post surgical repair of interrupted aortic arch and VSD repair, echocardiogram showed mild aortic valve stenosis, intact ventriculoseptal defect repair, mild-to-moderate TR, no significant changes when compared to previous. 2.  Chest pain: Noncardiac, patient was reassured  3. Shortness of breath: Secondary to asthma and deconditioning    4. Nonrheumatic aortic (valve) stenosis :  Mild by most recent echo   5. Nonrheumatic tricuspid (valve) insufficiency  :  Mild to moderate mitral most recent echo   6. Abnormal electrocardiogram EKG: Chronic changes     RECOMMENDATIONS:   1. Patient is at acceptable risk for perioperative cardiovascular event for the planned procedure (cholecystectomy), patient may proceed without any further cardiac testing. Please feel free to call for any further information  2. Increase ambulation as tolerated  3. Continue current treatment  4. Follow-up with Dr. Kaden Cutler as scheduled  5.   Follow-up with Dr. Shane Ortiz in 1 year, sooner if symptomatic for any reason     I have reviewed my findings and recommendations with patient     Electronically signed by Lachelle Smith MD on 7/28/2020     Neuro/Psych:   (+) seizures:, depression/anxiety             GI/Hepatic/Renal:   (+) GERD:, morbid obesity          Endo/Other:                      ROS comment: Clubfoot, congenital   Instability of left ankle joint   Right ankle instability   Difficulty walking  Abdominal:   (+) obese,         Vascular: negative vascular ROS. Anesthesia Plan      general     ASA 3       Induction: intravenous. MIPS: Postoperative opioids intended and Prophylactic antiemetics administered. Anesthetic plan and risks discussed with patient and mother. Plan discussed with CRNA.                 Jair Weathers MD   8/6/2020

## 2020-08-06 NOTE — PROGRESS NOTES
Resting quietly Encouraged to cough and deep breathe Medicated for c/o pain with some relief Report called to RN [facility]

## 2020-08-06 NOTE — H&P
General Surgery History and Physical  Tamiko Castro MD    Patient's Name/Date of Birth: Harry Mcfarland / 1990    Date: August 6, 2020     Surgeon: Kezia Gonzales M.D., M.S.    PCP: Shweta Collier DO     Chief Complaint: Right upper quadrant pain    HPI:   Harry Mcfarland is a 31 y.o. female who presents for evaluation of right upper quadrant pain, symptomatic gallstones. Timing is intermittent, radiation to back, alleviated by npo and started several weeks ago. Denies SOB, chest pain, fever, chills, diarrhea, constipation. LFT elevation on recent labs. Pain worse with food. She is MRDD, DiGeorge, cardiac surgery as a child. Past Medical History:   Diagnosis Date    Development delay     DiGeorge syndrome (HCC)     Fine motor delay     Obesity     Velocardiofacial syndrome        Past Surgical History:   Procedure Laterality Date    CARDIAC SURGERY         Current Facility-Administered Medications   Medication Dose Route Frequency Provider Last Rate Last Dose    0.9 % sodium chloride infusion   Intravenous Continuous Kezia Gonzales MD        ceFAZolin (ANCEF) 2 g in dextrose 3 % 50 mL IVPB (duplex)  2 g Intravenous On Call to Yue Campos MD        sodium chloride flush 0.9 % injection 10 mL  10 mL Intravenous 2 times per day Kezia Gonzales MD        sodium chloride flush 0.9 % injection 10 mL  10 mL Intravenous PRN Kezia Gonzales MD           Allergies   Allergen Reactions    Aspirin Other (See Comments)       The patient has a family history that is negative for severe cardiovascular or respiratory issues, negative for reaction to anesthesia.     Social History     Socioeconomic History    Marital status: Single     Spouse name: Not on file    Number of children: Not on file    Years of education: Not on file    Highest education level: Not on file   Occupational History    Not on file   Social Needs    Financial resource weakness, gangrene  Psych/Neuro ROS: negative for - visual or auditory hallucinations, suicidal ideation    Physical exam:   /84   Pulse 97   Temp 96 °F (35.6 °C) (Temporal)   Resp 20   Ht 5' 5\" (1.651 m)   Wt 268 lb (121.6 kg)   SpO2 98%   Breastfeeding No Comment: 7/31/2020  BMI 44.60 kg/m²   General appearance:  NAD, appears stated age  Head: NCAT, PERRLA, EOMI, red conjunctiva  Neck: supple, no masses, trachea midline  Lungs: Equal chest rise bilateral, no retractions, no wheezing  Heart: Reg rate  Abdomen: soft, tender RUQ  Skin; warm and dry, no cyanosis  Gu: no cva tenderness  Extremities: atraumatic, no focal motor deficits, no open wounds  Psych: No tremor, visual hallucinations        Radiology: I reviewed relevant abdominal imaging from this admission and that available in the EMR including RUQ US. My assessment is   Impression    1. Cholelithiases without evidence of acute cholecystitis. 2. Hepatic steatosis. Assessment:  31 y.o. female with symptomatic cholelithiasis, elevated LFTs, MRDD    Plan: To OR for laparoscopic possible robotic cholecystectomy with intraoperative cholangiogram  Discussed risks of injury to liver, common bile duct, hepatic duct, surrounding vascular structures, small bowel, stomach. Risk for further surgery to correct complications. Plan for laparoscopic, possible open cholecystectomy with possible intraoperative cholangiogram. Patient agrees and all questions answered to their and family's satisfaction    Medical and Cardiac clearance    The patient was counseled at length about the risks of ashley Covid-19 during their perioperative period and any recovery window from their procedure. The patient was made aware that ashley Covid-19  may worsen their prognosis for recovering from their procedure  and lend to a higher morbidity and/or mortality risk.   All material risks, benefits, and reasonable alternatives including postponing the procedure were discussed. The patient does wish to proceed with the procedure at this time.           Michelle Abreu MD  9:29 AM  8/6/2020

## 2020-08-06 NOTE — ANESTHESIA POSTPROCEDURE EVALUATION
Department of Anesthesiology  Postprocedure Note    Patient: Shayna Carter  MRN: [id number]  YOB: 1990  Date of evaluation: 8/6/2020  Time:  3:50 PM     Procedure Summary     Date:  08/06/20 Room / Location:  [address] [place] [address] / [address] [place] [address]    Anesthesia Start:  1031 Anesthesia Stop:  4274    Procedure:  CHOLECYSTECTOMY LAPAROSCOPIC WITH INTRAOPERATIVE CHOLANGIOGRAM (N/A Abdomen) Diagnosis:  (CHOLELITHIASIS)    Surgeon:  Joseiln Carroll MD Responsible Provider:  Dione Fierro MD    Anesthesia Type:  general ASA Status:  3          Anesthesia Type: general    Nikki Phase I: Nikki Score: 10    Nikki Phase II:      Last vitals: Reviewed and per EMR flowsheets.        Anesthesia Post Evaluation    Patient location during evaluation: PACU  Patient participation: complete - patient participated  Level of consciousness: awake  Airway patency: patent  Nausea & Vomiting: no nausea and no vomiting  Complications: no  Cardiovascular status: hemodynamically stable  Respiratory status: acceptable  Hydration status: stable

## 2020-08-06 NOTE — OP NOTE
[facility]  Operative Note      DATE OF PROCEDURE: 8/6/2020    SURGEON: Andree Barajas MD    ASSISTANT: none    PREOPERATIVE DIAGNOSIS: Symptomatic Cholelithiasis, Elevated LFTs    POSTOPERATIVE DIAGNOSIS: Symptomatic Cholelithiasis, Fatty liver    OPERATION: Laparoscopic cholecystectomy with intraoperative cholangiogram    ANESTHESIA: General endotracheal.    ESTIMATED BLOOD LOSS: Less than 10 mL. COMPLICATIONS: None. FLUIDS: Crystalloid. SPECIMEN: Gallbladder. DESCRIPTION OF PROCEDURE: This is a 31 y.o. female increasingly symptomatic right upper quadrant epigastric pain. After being explained the risks, benefits, and alternatives of the procedure, the patient agreed to proceed. We discussed at length the risks of bleeding, biliary and liver ductal injury, need for repeat or open procedures, need for catheter drainage and prolonged hospitalization. The patient agreed to proceed. The patient was taken to the operating room and placed supine on the operating room table, administered general anesthesia and intubated. Once the airway was secured and the patient was adequately sedated a time-out was performed to confirm the surgical site and the patient's name. We initially made a 5-mm incision superior to the umbilicus, inserted a Veress needle, confirmed needle placement and insufflated to 15 mmHg. We then removed the Veress needle and inserted a 5-mm trocar and inserted a camera to follow, and inspected the abdomen. Upon inspection of the abdomen, there was moderate inflammation around the right upper quadrant near the gallbladder. A 12-mm trocar was inserted subxiphoid just right and lateral to the falciform ligament and two more 5-mm trocars in the right upper quadrant. Atraumatic graspers were used grasp the gallbladder and retract cephalad. The gallbladder was distended, and taught to grasp.  On grasping bile was spilled as the gallbladder burs due to pressure from bile and stones. The fluid was suctioned. We then again retracted the gallbladder cephalad and exposed the infundibulum identifying the infundibulum. We dissected the peritoneum and omentum as well as transverse colon and duodenum off the infundibulum identifying the cystic duct junction. The cystic duct was skeletonized. The cystic artery was also identified ands skeletonized confirming it was leading directly into the gallbladder. The critical view was obtained. The 10mm titanium clip applier was used to place a single clip on the gallbladder side of the cystic duct. 2 clips proximally on the patient side and one distal on the gallbladder side of the cystic artery. A ductotomy was perfomed in the cystic duct and the Mixter catheter was inserted and clamped in place with the summers clamp. The catheter was flushed with saline first. It flushed well. A cholangiogram was performed with full strength dye showing full arborization of the intrahepatic biliary tree, common hepatic duct and cystic duct confluence into the common bile duct. Delayed imaging showed spillage of contrast into the small bowel. Our perceived anatomy was confirmed with the cholangiogram. No obstruction was appreciated. The catheter was removed, two clips were place on the patient side of the cystic duct. Pause was taken again to identify the critical view. The cystic duct was transected with laparoscopic scissors. Two clips were placed on the patient side, 1 distal on the gallbladder side of the cystic duct and it was transected with laparoscopic scissors and electrocautery. Electrocautery was then used to dissect the gallbladder from the gallbladder fossa. Traction-countertraction technique was used to expose the medial and lateral aspects of the gallbladder. Gallbladder was completely dissected off the gallbladder fossa, placed in a laparoscopic bag, and retracted through the 12-mm port site.  We then inspected our clip sites which were intact. Suction irrigation was undertaken to clear out the abdomen and it was suctioned until clear. One liter of fluids was used to irrigate. The gallbladder fossa was dry at this point and clips remained intact. We then removed the trocars under direct visualization. The abdomen was decompressed. The subxiphoid fascial incision was closed using 0 Vicryl suture and the suture closure device. Then we used 4-0 Monocryl suture to reapproximate the skin. Surgical glue was placed over the skin. The patient was awoken and extubated in the operating room without any difficulty, and transferred to the postoperative care unit in stable condition. All instrument counts, lap counts, and needle counts were correct at the completion of the procedure.     Vinnie Severe, MD  Minimally Invasive and Bariatric Surgery  8/6/2020  11:12 AM

## 2020-08-14 ENCOUNTER — OFFICE VISIT (OUTPATIENT)
Dept: NEUROLOGY | Age: 30
End: 2020-08-14
Payer: MEDICARE

## 2020-08-14 VITALS
HEART RATE: 95 BPM | WEIGHT: 268 LBS | HEIGHT: 65 IN | TEMPERATURE: 97.2 F | DIASTOLIC BLOOD PRESSURE: 75 MMHG | RESPIRATION RATE: 20 BRPM | OXYGEN SATURATION: 95 % | BODY MASS INDEX: 44.65 KG/M2 | SYSTOLIC BLOOD PRESSURE: 133 MMHG

## 2020-08-14 PROCEDURE — 99214 OFFICE O/P EST MOD 30 MIN: CPT | Performed by: CLINICAL NURSE SPECIALIST

## 2020-08-14 NOTE — PROGRESS NOTES
Clara Jaen is a 31 y.o. right handed female     This is a 31 y.o. woman who follows for seizures  The pt has a hx of DiGeorge syndrome with associated developmental delay and is with her mother today - who is an excellent historian. She reports seizures since birth - telling me she \"wasn't able to even hold her for hours because she wouldn't stop seizing\". She describes GTC events- her last one being April of 2017. At that time she was started on Keppra 500 mg BID. Continues on this medication without ill effects or further seizures. In 2018, her mother was still reporting \"stiffening spells\"   We increased her Keppra to 750mg BID and these stopped   No continued spells   EEG after increase was unrevealing     She is not sleeping well--- reportedly getting 5-6 hours per night. Sporadic and not restful   Snores frequently and \"makes sounds\"   But readily admits to a few hour nap everyday after work   We referred to sleep medicine which studies demonstrated JANELLE -- no Cpap thus far     Also reports tremors -- both hands   Worsens as she gets anxious    Graduated from [employer] and attended [employer] for 4 years. She now attends [employer]. Recent cholecystectomy     Medically, she is otherwise stable.     No chest pain or palpitations  No SOB  No vertigo, lightheadedness or loss of consciousness  No falls, tripping or stumbling  No incontinence of bowels or bladder  No itching or bruising appreciated  No numbness, tingling or focal arm/leg weakness    ROS otherwise negative     Allergies as of 08/14/2020 - Review Complete 08/14/2020   Allergen Reaction Noted    Aspirin Other (See Comments) 03/25/2013     Objective:     /75 (Site: Right Upper Arm, Position: Sitting, Cuff Size: Medium Adult)   Pulse 95   Temp 97.2 °F (36.2 °C)   Resp 20   Ht 5' 5\" (1.651 m)   Wt 268 lb (121.6 kg)   LMP  (LMP Unknown)   SpO2 95%   BMI 44.60 kg/m²   Afebrile     General Appearance: alert, cooperative, no distress, appears stated age    Extremities: no cyanosis or edema  Pulses: 1+ and symmetric all extremities -- hammer toes bilaterally   Skin: no rashes or lesions     Mental Status: alert; oriented to self and place; pleasant and cooperative    Speech: hypernasal  speech  Language: intact - without aphasias     Cranial Nerves:  I: smell    II: visual acuity     II: visual fields Full   II: pupils JOAN   III,VII: ptosis None   III,IV,VI: extraocular muscles  EOMI without nystagmus    V: mastication Normal   V: facial light touch sensation  Normal   V,VII: corneal reflex  Present   VII: facial muscle function - upper     VII: facial muscle function - lower Normal   VIII: hearing Normal   IX: soft palate elevation  Normal   IX,X: gag reflex Present   XI: trapezius strength  5/5   XI: sternocleidomastoid strength 5/5   XI: neck extension strength  5/5   XII: tongue strength  Normal     Motor  5/5 throughout  Slightly decreased tone throughout  Overweight bulk    Sensory:  LT  normal  Vibration minimally decreased in ankles     Coordination:   FN, FFM and EUGENIO symmetrical     Gait:  Slow and slightly unsteady  Walks on toes mainly     DTR:   BE throughout  No Celis's  No Babinski's  Bilateral grasp reflexes present     Laboratory/Radiology:     CBC with Differential:    Lab Results   Component Value Date    WBC 10.9 07/31/2020    RBC 4.76 07/31/2020    HGB 14.1 07/31/2020    HCT 45.1 07/31/2020     07/31/2020    MCV 94.7 07/31/2020    MCH 29.6 07/31/2020    MCHC 31.3 07/31/2020    RDW 13.0 07/31/2020    LYMPHOPCT 22.4 06/15/2020    MONOPCT 6.5 06/15/2020    BASOPCT 0.5 06/15/2020    MONOSABS 0.66 06/15/2020    LYMPHSABS 2.29 06/15/2020    EOSABS 0.25 06/15/2020    BASOSABS 0.05 06/15/2020     CMP:    Lab Results   Component Value Date     07/31/2020    K 4.0 07/31/2020    CL 97 07/31/2020    CO2 27 07/31/2020    BUN 10 07/31/2020    CREATININE 0.7 07/31/2020    GFRAA >60 07/31/2020 LABGLOM >60 07/31/2020    GLUCOSE 138 07/31/2020    PROT 8.2 06/15/2020    LABALBU 4.3 06/15/2020    CALCIUM 9.1 07/31/2020    BILITOT 0.5 06/15/2020    ALKPHOS 42 06/15/2020    AST 76 06/15/2020    ALT 75 06/15/2020     TSH:    Lab Results   Component Value Date    TSH 3.930 06/15/2020     Heads CT 3/2016: No acute intracranial hemorrhage or midline shift. Left frontal scalp hematoma present without calvarial fracture. [s/p fall] Symmetric bilateral basal ganglia calcifications are present. The gray-white matter junctions are intact. No space-occupying intra-axial masses. 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 No opacification of the mastoid sinuses and middle ears. EEG - June 2018  Normal     Imaging, diagnostic tests and labs (from 2018) personally reviewed at the time of this office visit    Assessment: This is a 31 y.o. woman with a hx of DiGeorge syndrome     History of GTC seizures since birth - likely associated with developmental disabilities    Resolved with higher doses of Keppra -- now taking 750mg BID    Developmental disability    Related to DiGeorge syndrome     With her new headaches and history of snoring, I am concerned with underlying JANELLE   Will refer to sleep medicine     Medically, she is otherwise well.      Plan:     continue Keppra to 750 mg BID    RTO 6 months     Kelly Doherty DNP, APRN  4:00 PM  8/14/2020

## 2020-08-19 ENCOUNTER — OFFICE VISIT (OUTPATIENT)
Dept: SURGERY | Age: 30
End: 2020-08-19

## 2020-08-19 VITALS
HEIGHT: 65 IN | DIASTOLIC BLOOD PRESSURE: 88 MMHG | TEMPERATURE: 98 F | BODY MASS INDEX: 44.65 KG/M2 | SYSTOLIC BLOOD PRESSURE: 141 MMHG | HEART RATE: 95 BPM | OXYGEN SATURATION: 94 % | WEIGHT: 268 LBS

## 2020-08-19 PROCEDURE — 99024 POSTOP FOLLOW-UP VISIT: CPT | Performed by: SURGERY

## 2020-08-24 ENCOUNTER — TELEPHONE (OUTPATIENT)
Dept: SLEEP CENTER | Age: 30
End: 2020-08-24

## 2020-09-16 ENCOUNTER — TELEMEDICINE (OUTPATIENT)
Dept: FAMILY MEDICINE CLINIC | Age: 30
End: 2020-09-16
Payer: MEDICARE

## 2020-09-16 PROCEDURE — 99213 OFFICE O/P EST LOW 20 MIN: CPT | Performed by: NURSE PRACTITIONER

## 2020-09-16 RX ORDER — ONDANSETRON 4 MG/1
4 TABLET, ORALLY DISINTEGRATING ORAL EVERY 8 HOURS PRN
Qty: 20 TABLET | Refills: 1 | Status: SHIPPED | OUTPATIENT
Start: 2020-09-16

## 2020-09-16 RX ORDER — LAMOTRIGINE 25 MG/1
TABLET ORAL
Qty: 60 TABLET | Refills: 2 | Status: SHIPPED
Start: 2020-09-16 | End: 2021-01-18

## 2020-09-16 ASSESSMENT — ENCOUNTER SYMPTOMS
NAUSEA: 1
ABDOMINAL PAIN: 0
CONSTIPATION: 0
SHORTNESS OF BREATH: 0
WHEEZING: 0
ABDOMINAL DISTENTION: 0
VOMITING: 0
DIARRHEA: 1
COUGH: 0

## 2020-10-14 RX ORDER — LEVETIRACETAM 750 MG/1
750 TABLET ORAL 2 TIMES DAILY
Qty: 60 TABLET | Refills: 11 | Status: SHIPPED
Start: 2020-10-14 | End: 2021-08-09 | Stop reason: SDUPTHER

## 2020-10-15 ENCOUNTER — TELEPHONE (OUTPATIENT)
Dept: FAMILY MEDICINE CLINIC | Age: 30
End: 2020-10-15

## 2020-10-16 ENCOUNTER — OFFICE VISIT (OUTPATIENT)
Dept: FAMILY MEDICINE CLINIC | Age: 30
End: 2020-10-16
Payer: MEDICARE

## 2020-10-16 VITALS
BODY MASS INDEX: 45.42 KG/M2 | WEIGHT: 272.6 LBS | HEART RATE: 86 BPM | HEIGHT: 65 IN | SYSTOLIC BLOOD PRESSURE: 110 MMHG | TEMPERATURE: 96.9 F | OXYGEN SATURATION: 98 % | DIASTOLIC BLOOD PRESSURE: 72 MMHG | RESPIRATION RATE: 20 BRPM

## 2020-10-16 PROCEDURE — 99213 OFFICE O/P EST LOW 20 MIN: CPT | Performed by: NURSE PRACTITIONER

## 2020-10-16 RX ORDER — CALCITRIOL 0.5 UG/1
0.5 CAPSULE, LIQUID FILLED ORAL 2 TIMES DAILY
Qty: 180 CAPSULE | Refills: 3 | Status: SHIPPED
Start: 2020-10-16 | End: 2022-10-03

## 2020-10-16 RX ORDER — IBUPROFEN 800 MG/1
TABLET ORAL
Qty: 90 TABLET | Refills: 0 | Status: SHIPPED
Start: 2020-10-16 | End: 2021-02-01 | Stop reason: SDUPTHER

## 2020-10-16 RX ORDER — CIPROFLOXACIN/HYDROCORTISONE 0.2 %-1 %
4 SUSPENSION, DROPS(FINAL DOSAGE FORM)(ML) OTIC (EAR) 2 TIMES DAILY
Qty: 1 BOTTLE | Refills: 0 | Status: SHIPPED | OUTPATIENT
Start: 2020-10-16 | End: 2020-10-23

## 2020-10-16 RX ORDER — SERTRALINE HYDROCHLORIDE 100 MG/1
TABLET, FILM COATED ORAL
Qty: 45 TABLET | Refills: 2 | Status: SHIPPED
Start: 2020-10-16 | End: 2021-02-25

## 2020-10-16 ASSESSMENT — ENCOUNTER SYMPTOMS
COUGH: 0
CONSTIPATION: 0
DIARRHEA: 1
WHEEZING: 1
VOMITING: 0
SHORTNESS OF BREATH: 1
NAUSEA: 1

## 2020-10-16 NOTE — PROGRESS NOTES
HPI:  Patient comes in today for   Chief Complaint   Patient presents with    Nausea    Dizziness     all the time sitting/standing    Cerumen Impaction    Otalgia     some ear pain in left ear   . Had gall bladder removed in August. F/u with surgeon a few weeks after. Have not had an appointment. Has been nauseated often since her surgery. She has been taking her zofran twice daily. Also complains of cerumen impaction. Trouble hearing in the right ear. She complains of more headaches. Patient snores. She has a sleep study coming     Special needs, mother in room for visit. Prior to Visit Medications    Medication Sig Taking? Authorizing Provider   calcitRIOL (ROCALTROL) 0.5 MCG capsule Take 1 capsule by mouth 2 times daily Yes TWIN Weiss CNP   sertraline (ZOLOFT) 100 MG tablet 1 1/2 tablets qd Yes TWIN Weiss CNP   ibuprofen (ADVIL;MOTRIN) 800 MG tablet TAKE ONE TABLET BY MOUTH EVERY 8 HOURS AS NEEDED FOR PAIN.  Yes TWIN Shelton CNP   ciprofloxacin-hydrocortisone (CIPRO HC) 0.2-1 % otic suspension Place 4 drops into both ears 2 times daily for 7 days Yes TWIN Weiss CNP   levETIRAcetam (KEPPRA) 750 MG tablet Take 1 tablet by mouth 2 times daily Yes TWIN Roldan   lamoTRIgine (LAMICTAL) 25 MG tablet TAKE 1 TABLET BY MOUTH TWICE A DAY Yes TWIN Erickson CNP   ondansetron (ZOFRAN-ODT) 4 MG disintegrating tablet Take 1 tablet by mouth every 8 hours as needed for Nausea or Vomiting Yes TWIN Erickson CNP   famotidine (PEPCID) 20 MG tablet Take 1 tablet by mouth 2 times daily Yes TWIN Erickson CNP   albuterol sulfate  (90 Base) MCG/ACT inhaler Inhale 2 puffs into the lungs every 6 hours as needed for Wheezing or Shortness of Breath Yes TWIN Erickson - CNP   cetirizine (ZYRTEC) 10 MG tablet TAKE ONE TABLET BY MOUTH DAILY Yes TWIN Erickson CNP   vitamin D (CHOLECALCIFEROL) 28009 UNIT CAPS Take 1 Rate and Rhythm: Normal rate and regular rhythm. Heart sounds: No murmur. Pulmonary:      Effort: Pulmonary effort is normal.      Breath sounds: Normal breath sounds. Abdominal:      General: Bowel sounds are normal.      Palpations: Abdomen is soft. Tenderness: There is no abdominal tenderness. Musculoskeletal: Normal range of motion. Lymphadenopathy:      Cervical: No cervical adenopathy. Skin:     General: Skin is warm and dry. Neurological:      Mental Status: She is alert and oriented to person, place, and time. Psychiatric:         Behavior: Behavior normal.           Assessment/Plan:  Peng was seen today for nausea, dizziness, cerumen impaction and otalgia. Diagnoses and all orders for this visit:    Acute otitis media, unspecified otitis media type  -     ciprofloxacin-hydrocortisone (CIPRO HC) 0.2-1 % otic suspension; Place 4 drops into both ears 2 times daily for 7 days  - Reviewed side effects of medication and patient verbalizes understanding.   - Advised to call back directly if there are further questions, or if these symptoms fail to improve as anticipated or worsen. - Do not flush ears for at least one week    Hypocalcemia  -     calcitRIOL (ROCALTROL) 0.5 MCG capsule; Take 1 capsule by mouth 2 times daily  - The current medical regimen is effective;  continue present plan and medications. Hypoparathyroidism, unspecified hypoparathyroidism type ([address]  -     calcitRIOL (ROCALTROL) 0.5 MCG capsule; Take 1 capsule by mouth 2 times daily  - The current medical regimen is effective;  continue present plan and medications. Anxiety and depression  -     sertraline (ZOLOFT) 100 MG tablet; 1 1/2 tablets qd  - The current medical regimen is effective;  continue present plan and medications.     Nausea  -  Increase zofran to 3 times daily  -  Continue Pepcid  -  Avoid acidic foods     Functional diarrhea  -  Increase daily fiber intake    Development delay    Bilateral impacted cerumen  -  Ceruminosis is noted. Wax is removed by syringing and manual debridement. Instructions for home care to prevent wax buildup are given. Other orders  -     ibuprofen (ADVIL;MOTRIN) 800 MG tablet; TAKE ONE TABLET BY MOUTH EVERY 8 HOURS AS NEEDED FOR PAIN.  - The current medical regimen is effective;  continue present plan and medications.     Greater than 15 minutes was spent with patient and more than 50% of the time was spent face to face counseling and educating regarding diagnoses

## 2020-10-23 ENCOUNTER — TELEPHONE (OUTPATIENT)
Dept: FAMILY MEDICINE CLINIC | Age: 30
End: 2020-10-23

## 2020-10-23 RX ORDER — OFLOXACIN 3 MG/ML
5 SOLUTION AURICULAR (OTIC) 2 TIMES DAILY
Qty: 10 ML | Refills: 0 | Status: SHIPPED | OUTPATIENT
Start: 2020-10-23 | End: 2020-11-02

## 2020-10-23 NOTE — TELEPHONE ENCOUNTER
Pharmacy sent Cipro otic solution script sent back (from 10/16), states that it is not covered by insurance and is over $340 on discount card, wants to know if we will switch to something else (alternative not listed).

## 2020-10-23 NOTE — TELEPHONE ENCOUNTER
Sent oflocacin otic to [facility]. If it is expensive with insurance, GoodRx price is $28 at [address] [place] [address]. The Rx could be transferred if [facility] will not meet price.

## 2020-11-10 ENCOUNTER — OFFICE VISIT (OUTPATIENT)
Dept: PODIATRY | Age: 30
End: 2020-11-10
Payer: MEDICARE

## 2020-11-10 VITALS — HEIGHT: 65 IN | BODY MASS INDEX: 45.32 KG/M2 | WEIGHT: 272 LBS

## 2020-11-10 PROCEDURE — 99213 OFFICE O/P EST LOW 20 MIN: CPT | Performed by: PODIATRIST

## 2020-11-10 NOTE — PROGRESS NOTES
Patient is in today for routine nail care. Patient also says she has been having pain in her right ankle.  pcp is Lyn Fields, DO

## 2020-11-11 NOTE — PROGRESS NOTES
11/10/20     Feliciano Hernándze    : 1990   Sex: female    Age: 31 y.o. Patient's PCP/Provider is:  Eloina Dia DO    Subjective:  Patient is seen today for follow-up regarding new care ingrown nails to both great toes. She denies any nausea, vomiting, fever, chills. No other additional abnormalities noted at this time. Chief Complaint   Patient presents with    Nail Problem     nail care    Ankle Pain     right        ROS:  Const: Positives and pertinent negatives as per HPI. Musculo: Denies symptoms other than stated above. Neuro: Denies symptoms other than stated above. Skin: Denies symptoms other than stated above.     Current Medications:    Current Outpatient Medications:     calcitRIOL (ROCALTROL) 0.5 MCG capsule, Take 1 capsule by mouth 2 times daily, Disp: 180 capsule, Rfl: 3    sertraline (ZOLOFT) 100 MG tablet, 1 1/2 tablets qd, Disp: 45 tablet, Rfl: 2    ibuprofen (ADVIL;MOTRIN) 800 MG tablet, TAKE ONE TABLET BY MOUTH EVERY 8 HOURS AS NEEDED FOR PAIN., Disp: 90 tablet, Rfl: 0    levETIRAcetam (KEPPRA) 750 MG tablet, Take 1 tablet by mouth 2 times daily, Disp: 60 tablet, Rfl: 11    lamoTRIgine (LAMICTAL) 25 MG tablet, TAKE 1 TABLET BY MOUTH TWICE A DAY, Disp: 60 tablet, Rfl: 2    ondansetron (ZOFRAN-ODT) 4 MG disintegrating tablet, Take 1 tablet by mouth every 8 hours as needed for Nausea or Vomiting, Disp: 20 tablet, Rfl: 1    famotidine (PEPCID) 20 MG tablet, Take 1 tablet by mouth 2 times daily, Disp: 60 tablet, Rfl: 3    albuterol sulfate  (90 Base) MCG/ACT inhaler, Inhale 2 puffs into the lungs every 6 hours as needed for Wheezing or Shortness of Breath, Disp: 1 Inhaler, Rfl: 0    cetirizine (ZYRTEC) 10 MG tablet, TAKE ONE TABLET BY MOUTH DAILY, Disp: 30 tablet, Rfl: 3    vitamin D (CHOLECALCIFEROL) 67779 UNIT CAPS, Take 1 capsule weekly, Disp: 12 capsule, Rfl: 3    calcium carbonate (CALCIUM 600) 600 MG TABS tablet, Take 2 tablet three times a day,

## 2020-12-01 RX ORDER — FAMOTIDINE 20 MG/1
TABLET, FILM COATED ORAL
Qty: 60 TABLET | Refills: 0 | Status: SHIPPED
Start: 2020-12-01 | End: 2021-01-18

## 2021-01-16 DIAGNOSIS — F41.9 ANXIETY: ICD-10-CM

## 2021-01-16 DIAGNOSIS — K21.9 GASTROESOPHAGEAL REFLUX DISEASE WITHOUT ESOPHAGITIS: ICD-10-CM

## 2021-01-18 RX ORDER — LAMOTRIGINE 25 MG/1
TABLET ORAL
Qty: 60 TABLET | Refills: 0 | Status: SHIPPED
Start: 2021-01-18 | End: 2021-02-25

## 2021-01-18 RX ORDER — FAMOTIDINE 20 MG/1
TABLET, FILM COATED ORAL
Qty: 60 TABLET | Refills: 0 | Status: SHIPPED
Start: 2021-01-18 | End: 2021-02-25

## 2021-01-19 ENCOUNTER — OFFICE VISIT (OUTPATIENT)
Dept: PODIATRY | Age: 31
End: 2021-01-19
Payer: MEDICARE

## 2021-01-19 VITALS — WEIGHT: 272 LBS | BODY MASS INDEX: 45.32 KG/M2 | HEIGHT: 65 IN

## 2021-01-19 DIAGNOSIS — M25.371 RIGHT ANKLE INSTABILITY: ICD-10-CM

## 2021-01-19 DIAGNOSIS — R26.2 DIFFICULTY WALKING: ICD-10-CM

## 2021-01-19 DIAGNOSIS — L60.0 OC (ONYCHOCRYPTOSIS): Primary | ICD-10-CM

## 2021-01-19 DIAGNOSIS — M25.372 INSTABILITY OF LEFT ANKLE JOINT: ICD-10-CM

## 2021-01-19 DIAGNOSIS — Q66.89 CLUBFOOT, CONGENITAL: ICD-10-CM

## 2021-01-19 PROCEDURE — 99213 OFFICE O/P EST LOW 20 MIN: CPT | Performed by: PODIATRIST

## 2021-01-19 NOTE — PROGRESS NOTES
Patient in today for nail care. Patient does not have any complaints of pain at this time.  Patient's PCP is Torsten Merchant,  date of last ov 12/5/2020          Electronically signed by Valentin Esteban MA on 1/19/2021 at 11:37 AM

## 2021-01-20 NOTE — PROGRESS NOTES
21     Amy Tripathi    : 1990   Sex: female    Age: 36 y.o. Patient's PCP/Provider is:  Scot Swanson DO    Subjective:  Patient is seen today for follow-up regarding continued evaluation regarding chronic ingrown nail to both great toes. Patient also complaining of some ankle instability issues both lower extremities left greater than right. Patient denies any additional issues at this time. She has been trying to wear her good supportive shoe gear as previously instructed. She denies any recent injury or change in activities. Chief Complaint   Patient presents with    Nail Problem     nail care       ROS:  Const: Positives and pertinent negatives as per HPI. Musculo: Denies symptoms other than stated above. Neuro: Denies symptoms other than stated above. Skin: Denies symptoms other than stated above.     Current Medications:    Current Outpatient Medications:     famotidine (PEPCID) 20 MG tablet, TAKE ONE TABLET BY MOUTH TWO TIMES A DAY, Disp: 60 tablet, Rfl: 0    lamoTRIgine (LAMICTAL) 25 MG tablet, TAKE ONE TABLET BY MOUTH TWO TIMES A DAY, Disp: 60 tablet, Rfl: 0    calcitRIOL (ROCALTROL) 0.5 MCG capsule, Take 1 capsule by mouth 2 times daily, Disp: 180 capsule, Rfl: 3    sertraline (ZOLOFT) 100 MG tablet, 1 1/2 tablets qd, Disp: 45 tablet, Rfl: 2    ibuprofen (ADVIL;MOTRIN) 800 MG tablet, TAKE ONE TABLET BY MOUTH EVERY 8 HOURS AS NEEDED FOR PAIN., Disp: 90 tablet, Rfl: 0    levETIRAcetam (KEPPRA) 750 MG tablet, Take 1 tablet by mouth 2 times daily, Disp: 60 tablet, Rfl: 11    ondansetron (ZOFRAN-ODT) 4 MG disintegrating tablet, Take 1 tablet by mouth every 8 hours as needed for Nausea or Vomiting, Disp: 20 tablet, Rfl: 1    albuterol sulfate  (90 Base) MCG/ACT inhaler, Inhale 2 puffs into the lungs every 6 hours as needed for Wheezing or Shortness of Breath, Disp: 1 Inhaler, Rfl: 0    cetirizine (ZYRTEC) 10 MG tablet, TAKE ONE TABLET BY MOUTH DAILY, Disp: 30 tablet, Rfl: 3    vitamin D (CHOLECALCIFEROL) 60092 UNIT CAPS, Take 1 capsule weekly, Disp: 12 capsule, Rfl: 3    calcium carbonate (CALCIUM 600) 600 MG TABS tablet, Take 2 tablet three times a day, Disp: 180 tablet, Rfl: 12    Guselkumab (TREMFYA SC), Inject into the skin, Disp: , Rfl:     busPIRone (BUSPAR) 5 MG tablet, TAKE ONE TABLET BY MOUTH TWO TIMES A DAY, Disp: 60 tablet, Rfl: 0    clobetasol (TEMOVATE) 0.05 % cream, Apply topically 2 times daily Apply topically 2 times daily. , Disp: 30 g, Rfl: 5    Ciclopirox 1 % SHAM, USE EVERY OTHER DAY AS A SHAMPOO, Disp: 120 mL, Rfl: 5    Clobetasol Propionate Emulsion 0.05 % FOAM, APPLY TO RASH ON SCALP TWICE A DAY AS NEEDED, Disp: , Rfl: 5    Allergies: Allergies   Allergen Reactions    Aspirin Other (See Comments)       Vitals:    01/19/21 1137   Weight: 272 lb (123.4 kg)   Height: 5' 5\" (1.651 m)       Exam:  Neurovascular status unchanged. Ingrown nail issues noted to both great toes, without signs of infection noted. No maceration the webspaces noted bilateral foot. Clubfoot issues noted both lower extremities left greater than right which has led to increased ankle instability left lower extremity. Generalized edematous issues noted to both lower extremities. No other additional abnormalities noted. Diagnostic Studies:     No results found. Procedures:    None    Plan Per Assessment  Peng was seen today for nail problem. Diagnoses and all orders for this visit:    OC (onychocryptosis)  -     Amb External Referral For Orthotics    Clubfoot, congenital  -     Amb External Referral For Orthotics    Right ankle instability  -     Amb External Referral For Orthotics    Instability of left ankle joint    Difficulty walking      1. Evaluation and management  2. Debridement ingrown nails was performed to patient tolerance. No nail matrix procedure performed on today's visit.   3. We did advised the patient to continue with her current AFO

## 2021-02-01 ENCOUNTER — OFFICE VISIT (OUTPATIENT)
Dept: FAMILY MEDICINE CLINIC | Age: 31
End: 2021-02-01
Payer: MEDICARE

## 2021-02-01 VITALS
BODY MASS INDEX: 43.49 KG/M2 | TEMPERATURE: 96.9 F | HEART RATE: 99 BPM | HEIGHT: 65 IN | WEIGHT: 261 LBS | RESPIRATION RATE: 14 BRPM | SYSTOLIC BLOOD PRESSURE: 126 MMHG | DIASTOLIC BLOOD PRESSURE: 80 MMHG | OXYGEN SATURATION: 98 %

## 2021-02-01 DIAGNOSIS — F32.A ANXIETY AND DEPRESSION: ICD-10-CM

## 2021-02-01 DIAGNOSIS — M25.372 INSTABILITY OF LEFT ANKLE JOINT: ICD-10-CM

## 2021-02-01 DIAGNOSIS — F41.9 ANXIETY AND DEPRESSION: ICD-10-CM

## 2021-02-01 DIAGNOSIS — E55.9 VITAMIN D DEFICIENCY: ICD-10-CM

## 2021-02-01 DIAGNOSIS — R26.2 DIFFICULTY WALKING: ICD-10-CM

## 2021-02-01 DIAGNOSIS — Q66.89 CLUBFOOT, CONGENITAL: Primary | ICD-10-CM

## 2021-02-01 PROCEDURE — 99213 OFFICE O/P EST LOW 20 MIN: CPT | Performed by: NURSE PRACTITIONER

## 2021-02-01 RX ORDER — IBUPROFEN 800 MG/1
TABLET ORAL
Qty: 90 TABLET | Refills: 0 | Status: SHIPPED
Start: 2021-02-01 | End: 2021-11-24 | Stop reason: SDUPTHER

## 2021-02-01 ASSESSMENT — ENCOUNTER SYMPTOMS
VOMITING: 0
CONSTIPATION: 0
COUGH: 0
SHORTNESS OF BREATH: 1
NAUSEA: 0
WHEEZING: 0
DIARRHEA: 0

## 2021-02-01 NOTE — PROGRESS NOTES
Natalia Allen (:  1990) is a 36 y.o. female,Established patient, here for evaluation of the following chief complaint(s): Other (wants walker for bad balance. podiatry already referred her for one)      ASSESSMENT/PLAN:  1. Clubfoot, congenital  -     DME Order for Walker as OP  2. Vitamin D deficiency  -     vitamin D-3 (CHOLECALCIFEROL) 125 MCG (5000 UT) TABS; Take 1 tablet by mouth daily, Disp-30 tablet, R-5Normal  3. Instability of left ankle joint  -     DME Order for Jean Ibarra as OP  4. Difficulty walking  -     DME Order for Jean Ibarra as OP  5. Anxiety and depression  -mother agrees to call Peng's mental health specialist when she leaves our office and schedule a follow up. No follow-ups on file. SUBJECTIVE/OBJECTIVE:  Complains of poor balance. Patient is concerned she is going to fall. Has had several falls. She has inserts in right and brace on left due to ankle instablity. Review of Systems   Constitutional: Negative for activity change, appetite change and unexpected weight change. Respiratory: Positive for shortness of breath. Negative for cough and wheezing. Cardiovascular: Negative for chest pain and palpitations. Gastrointestinal: Negative for constipation, diarrhea, nausea and vomiting. Musculoskeletal: Positive for gait problem. Neurological: Negative for weakness, light-headedness and headaches. Psychiatric/Behavioral: Positive for dysphoric mood, sleep disturbance and suicidal ideas (thoughts but no plans or attempts). The patient is nervous/anxious. Physical Exam  Constitutional:       General: She is not in acute distress. Appearance: She is well-developed. HENT:      Head: Normocephalic and atraumatic. Neck:      Thyroid: No thyromegaly. Trachea: No tracheal deviation. Cardiovascular:      Rate and Rhythm: Normal rate and regular rhythm. Heart sounds: No murmur.    Pulmonary:      Effort: Pulmonary effort is normal.      Breath sounds: Normal breath sounds. No wheezing or rales. Chest:      Chest wall: No tenderness. Abdominal:      General: Bowel sounds are normal.      Palpations: Abdomen is soft. Tenderness: There is no abdominal tenderness. Musculoskeletal:      Comments: Brace left ankle   Lymphadenopathy:      Cervical: No cervical adenopathy. Skin:     General: Skin is warm and dry. Neurological:      Mental Status: She is alert and oriented to person, place, and time. Comments: Wide based unstable gait   Psychiatric:         Mood and Affect: Mood normal.      Comments: Patient states she sees demons, has night terrors and is afraid she is going to hurt herself. [facility] low      An electronic signature was used to authenticate this note.     --Amber Kidd, TWIN - CNP

## 2021-02-25 DIAGNOSIS — F32.A ANXIETY AND DEPRESSION: ICD-10-CM

## 2021-02-25 DIAGNOSIS — F41.9 ANXIETY: ICD-10-CM

## 2021-02-25 DIAGNOSIS — K21.9 GASTROESOPHAGEAL REFLUX DISEASE WITHOUT ESOPHAGITIS: ICD-10-CM

## 2021-02-25 DIAGNOSIS — F41.9 ANXIETY AND DEPRESSION: ICD-10-CM

## 2021-02-25 RX ORDER — SERTRALINE HYDROCHLORIDE 100 MG/1
TABLET, FILM COATED ORAL
Qty: 45 TABLET | Refills: 0 | Status: SHIPPED
Start: 2021-02-25 | End: 2021-04-16

## 2021-02-25 RX ORDER — FAMOTIDINE 20 MG/1
TABLET, FILM COATED ORAL
Qty: 60 TABLET | Refills: 0 | Status: SHIPPED
Start: 2021-02-25 | End: 2021-04-16

## 2021-02-25 RX ORDER — LAMOTRIGINE 25 MG/1
TABLET ORAL
Qty: 60 TABLET | Refills: 0 | Status: SHIPPED
Start: 2021-02-25 | End: 2021-04-16

## 2021-03-16 ENCOUNTER — OFFICE VISIT (OUTPATIENT)
Dept: NEUROLOGY | Age: 31
End: 2021-03-16
Payer: MEDICARE

## 2021-03-16 VITALS
TEMPERATURE: 97 F | BODY MASS INDEX: 41.95 KG/M2 | HEIGHT: 66 IN | WEIGHT: 261 LBS | DIASTOLIC BLOOD PRESSURE: 90 MMHG | RESPIRATION RATE: 20 BRPM | OXYGEN SATURATION: 95 % | SYSTOLIC BLOOD PRESSURE: 140 MMHG | HEART RATE: 93 BPM

## 2021-03-16 DIAGNOSIS — R26.9 GAIT DIFFICULTY: ICD-10-CM

## 2021-03-16 DIAGNOSIS — G40.409 OTHER GENERALIZED EPILEPSY, NOT INTRACTABLE, WITHOUT STATUS EPILEPTICUS (HCC): Primary | ICD-10-CM

## 2021-03-16 DIAGNOSIS — D82.1 DIGEORGE SYNDROME (HCC): ICD-10-CM

## 2021-03-16 PROCEDURE — 99214 OFFICE O/P EST MOD 30 MIN: CPT | Performed by: CLINICAL NURSE SPECIALIST

## 2021-03-16 NOTE — PROGRESS NOTES
Guzman Grayson is a 36 y.o. right handed female     This is a 36 y.o. woman who follows for seizures  The pt has a hx of DiGeorge syndrome with associated developmental delay and is with her mother today - who is an excellent historian. She reports seizures since birth - telling me she \"wasn't able to even hold her for hours because she wouldn't stop seizing\". She describes GTC events- her last one being April of 2017. At that time she was started on Keppra 500 mg BID. In 2018, her mother was still reporting \"stiffening spells\"   We increased her Keppra to 750mg BID and these stopped   No continued spells   EEG after increase was unrevealing     She is not sleeping well--- reportedly getting 5-6 hours per night. Sporadic and not restful   Snores frequently and \"makes sounds\"   But readily admits to a few hour nap everyday after work    We referred to sleep medicine which studies demonstrated JANELLE but she never followed up for mask testing/fitting -- now after CoV and with vaccinations, she is planning on calling and going for appt     Lab studies last summer revealed moderately elevated liver enz -- recommend repeating (recent cholecystectomy)    Also reports tremors -- both hands   Worsens as she gets anxious    Graduated from [employer] and attended [employer] for 4 years. She now attends [employer]. Medically, she is otherwise stable.     No chest pain or palpitations  No SOB  No vertigo, lightheadedness or loss of consciousness  No falls, tripping or stumbling  No incontinence of bowels or bladder  No itching or bruising appreciated  No numbness, tingling or focal arm/leg weakness    ROS otherwise negative     Allergies as of 03/16/2021 - Review Complete 03/16/2021   Allergen Reaction Noted    Aspirin Other (See Comments) 03/25/2013     Objective:     BP (!) 140/90 (Site: Right Upper Arm, Position: Sitting, Cuff Size: Large Adult)   Pulse 93   Temp 97 °F (36.1 °C)   Resp 20   Ht 5' 6\" (1.676 m)   Wt 261 lb (118.4 kg)   SpO2 95%   BMI 42.13 kg/m²   Afebrile     General Appearance: alert, cooperative, no distress, appears stated age    Extremities: no cyanosis or edema  Pulses: 1+ and symmetric all extremities -- left afo   Skin: no rashes or lesions     Mental Status: alert; oriented to self and place; pleasant and cooperative    Speech: hypernasal  speech  Language: intact - without aphasias     Cranial Nerves:  I: smell    II: visual acuity     II: visual fields Full   II: pupils JOAN   III,VII: ptosis None   III,IV,VI: extraocular muscles  EOMI without nystagmus    V: mastication Normal   V: facial light touch sensation  Normal   V,VII: corneal reflex  Present   VII: facial muscle function - upper     VII: facial muscle function - lower Normal   VIII: hearing Normal   IX: soft palate elevation  Normal   IX,X: gag reflex    XI: trapezius strength  5/5   XI: sternocleidomastoid strength 5/5   XI: neck extension strength  5/5   XII: tongue strength  Normal     Motor  5/5 throughout  Slightly decreased tone throughout  Overweight bulk    Sensory:  LT  normal  Vibration minimally decreased in ankles     Coordination:   FN, FFM and EUGENIO symmetrical     Gait:  Slow and slightly unsteady  Walks on toes mainly   Left afo     DTR:   BE throughout  No Celis's  No Babinski's  Bilateral grasp reflexes present     Laboratory/Radiology:     CBC with Differential:    Lab Results   Component Value Date    WBC 10.9 07/31/2020    RBC 4.76 07/31/2020    HGB 14.1 07/31/2020    HCT 45.1 07/31/2020     07/31/2020    MCV 94.7 07/31/2020    MCH 29.6 07/31/2020    MCHC 31.3 07/31/2020    RDW 13.0 07/31/2020    LYMPHOPCT 22.4 06/15/2020    MONOPCT 6.5 06/15/2020    BASOPCT 0.5 06/15/2020    MONOSABS 0.66 06/15/2020    LYMPHSABS 2.29 06/15/2020    EOSABS 0.25 06/15/2020    BASOSABS 0.05 06/15/2020     CMP:    Lab Results   Component Value Date     07/31/2020    K 4.0 07/31/2020    CL 97 07/31/2020    CO2 27 07/31/2020    BUN 10 07/31/2020    CREATININE 0.7 07/31/2020    GFRAA >60 07/31/2020    LABGLOM >60 07/31/2020    GLUCOSE 138 07/31/2020    PROT 8.2 06/15/2020    LABALBU 4.3 06/15/2020    CALCIUM 9.1 07/31/2020    BILITOT 0.5 06/15/2020    ALKPHOS 42 06/15/2020    AST 76 06/15/2020    ALT 75 06/15/2020     TSH:    Lab Results   Component Value Date    TSH 3.930 06/15/2020     Heads CT 3/2016: No acute intracranial hemorrhage or midline shift. Left frontal scalp hematoma present without calvarial fracture. [s/p fall] Symmetric bilateral basal ganglia calcifications are present. The gray-white matter junctions are intact. No space-occupying intra-axial masses. 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 No opacification of the mastoid sinuses and middle ears. EEG - June 2018  Normal     Imaging, diagnostic tests and labs (from 2018) personally reviewed at the time of this office visit    Assessment: This is a 36 y.o. woman with a hx of DiGeorge syndrome     History of GTC seizures since birth - likely associated with developmental disabilities    Resolved with higher doses of Keppra -- now taking 750mg BID    Developmental disability    Related to DiGeorge syndrome     With her new headaches and history of snoring, I am concerned with underlying JANELLE   Needs to follow up with sleep medicine     Gait difficulties from above -- will have PT get involved     Medically, she is otherwise well.      Plan:     continue Keppra to 750 mg BID    Lab studies    PT to evaluate     RTO 5 months     Víctor Maguire, FERN, APRN  10:16 AM  3/16/2021

## 2021-04-08 ENCOUNTER — TELEPHONE (OUTPATIENT)
Dept: ADMINISTRATIVE | Age: 31
End: 2021-04-08

## 2021-04-08 NOTE — TELEPHONE ENCOUNTER
Pt's mother called to request an appointment with Bhavani Zhang for a sore throat, no fever but pt feels hot, states that she doesn't feel well. [facility] either [facility] or [address], mother agreed, may take her later today.

## 2021-04-16 DIAGNOSIS — F32.A ANXIETY AND DEPRESSION: ICD-10-CM

## 2021-04-16 DIAGNOSIS — K21.9 GASTROESOPHAGEAL REFLUX DISEASE WITHOUT ESOPHAGITIS: ICD-10-CM

## 2021-04-16 DIAGNOSIS — F41.9 ANXIETY: ICD-10-CM

## 2021-04-16 DIAGNOSIS — F41.9 ANXIETY AND DEPRESSION: ICD-10-CM

## 2021-04-16 RX ORDER — SERTRALINE HYDROCHLORIDE 100 MG/1
TABLET, FILM COATED ORAL
Qty: 45 TABLET | Refills: 0 | Status: SHIPPED
Start: 2021-04-16 | End: 2021-06-04

## 2021-04-16 RX ORDER — FAMOTIDINE 20 MG/1
TABLET, FILM COATED ORAL
Qty: 60 TABLET | Refills: 0 | Status: SHIPPED
Start: 2021-04-16 | End: 2021-06-04

## 2021-04-16 RX ORDER — LAMOTRIGINE 25 MG/1
TABLET ORAL
Qty: 60 TABLET | Refills: 0 | Status: SHIPPED
Start: 2021-04-16 | End: 2021-06-04

## 2021-04-19 ENCOUNTER — OFFICE VISIT (OUTPATIENT)
Dept: FAMILY MEDICINE CLINIC | Age: 31
End: 2021-04-19
Payer: MEDICARE

## 2021-04-19 VITALS
BODY MASS INDEX: 43.34 KG/M2 | OXYGEN SATURATION: 94 % | HEIGHT: 65 IN | RESPIRATION RATE: 16 BRPM | WEIGHT: 260.1 LBS | DIASTOLIC BLOOD PRESSURE: 76 MMHG | HEART RATE: 111 BPM | TEMPERATURE: 97.1 F | SYSTOLIC BLOOD PRESSURE: 128 MMHG

## 2021-04-19 DIAGNOSIS — R61 NIGHT SWEATS: ICD-10-CM

## 2021-04-19 DIAGNOSIS — N64.4 BREAST TENDERNESS IN FEMALE: ICD-10-CM

## 2021-04-19 DIAGNOSIS — R06.83 SNORING: ICD-10-CM

## 2021-04-19 DIAGNOSIS — G47.19 EXCESSIVE DAYTIME SLEEPINESS: ICD-10-CM

## 2021-04-19 DIAGNOSIS — F41.9 ANXIETY: ICD-10-CM

## 2021-04-19 DIAGNOSIS — R53.83 FATIGUE, UNSPECIFIED TYPE: Primary | ICD-10-CM

## 2021-04-19 DIAGNOSIS — J45.30 MILD PERSISTENT ASTHMA WITHOUT COMPLICATION: ICD-10-CM

## 2021-04-19 PROCEDURE — 99213 OFFICE O/P EST LOW 20 MIN: CPT | Performed by: NURSE PRACTITIONER

## 2021-04-19 RX ORDER — BUSPIRONE HYDROCHLORIDE 5 MG/1
TABLET ORAL
Qty: 60 TABLET | Refills: 2 | Status: SHIPPED
Start: 2021-04-19 | End: 2021-09-27

## 2021-04-19 RX ORDER — ALBUTEROL SULFATE 90 UG/1
2 AEROSOL, METERED RESPIRATORY (INHALATION) EVERY 6 HOURS PRN
Qty: 1 INHALER | Refills: 2 | Status: SHIPPED | OUTPATIENT
Start: 2021-04-19

## 2021-04-19 ASSESSMENT — ENCOUNTER SYMPTOMS
EYE ITCHING: 1
CONSTIPATION: 0
NAUSEA: 0
SHORTNESS OF BREATH: 1
WHEEZING: 0
DIARRHEA: 0
COUGH: 0
VOMITING: 0

## 2021-04-19 NOTE — PROGRESS NOTES
Mario Soto (:  1990) is a 36 y.o. female,Established patient, here for evaluation of the following chief complaint(s):  Chest Pain (changes from side to side feels like a snake crawling inside) and Eye Problem (bilateral eye itching )      ASSESSMENT/PLAN:  1. Fatigue, unspecified type  -     TSH without Reflex; Future  2. Mild persistent asthma without complication  -     albuterol sulfate  (90 Base) MCG/ACT inhaler; Inhale 2 puffs into the lungs every 6 hours as needed for Wheezing or Shortness of Breath, Disp-1 Inhaler, R-2Normal  3. Anxiety  -     busPIRone (BUSPAR) 5 MG tablet; TAKE ONE TABLET BY MOUTH TWO TIMES A DAY, Disp-60 tablet, R-2Normal  4. Night sweats  -     TSH without Reflex; Future  5. Snoring  Sleep study    6. Excessive daytime sleepiness  -     Baseline Diagnostic Sleep Study; Future    7. Breast tenderness in female  -wear sports bra 24 hours/day. Normally does not wear a bra  -Contact office if symptoms do not improve as expected or worsen. Return in about 3 months (around 2021), or if symptoms worsen or fail to improve. SUBJECTIVE/OBJECTIVE:  Complains of pain to left breast and mid chest.  States pain is in chest and in breast also. Onset one week ago. No family history of breast CA  Complains eyes are itchy and feel like they want to close when she is sleepy. Has seasonal allergies and has not been taking zyrtec      Review of Systems   Constitutional: Positive for appetite change (decreased). Negative for activity change and unexpected weight change. Eyes: Positive for itching. Respiratory: Positive for shortness of breath (CHINO). Negative for cough and wheezing. Cardiovascular: Positive for chest pain. Negative for palpitations. Gastrointestinal: Negative for constipation, diarrhea, nausea and vomiting. Allergic/Immunologic: Positive for environmental allergies. Neurological: Positive for headaches.  Negative for weakness and light-headedness. Physical Exam  Exam conducted with a chaperone present. Constitutional:       General: She is not in acute distress. Appearance: She is well-developed. She is obese. HENT:      Head: Normocephalic and atraumatic. Neck:      Thyroid: No thyromegaly. Trachea: No tracheal deviation. Cardiovascular:      Rate and Rhythm: Normal rate and regular rhythm. Heart sounds: No murmur. Pulmonary:      Effort: Pulmonary effort is normal.      Breath sounds: Normal breath sounds. No wheezing or rales. Comments: Left breast larger than right, no masses, dimpling or skin changes noted on breast exam  Chest:      Chest wall: No tenderness. Abdominal:      General: Bowel sounds are normal.      Palpations: Abdomen is soft. Tenderness: There is no abdominal tenderness. Lymphadenopathy:      Cervical: No cervical adenopathy. Skin:     General: Skin is warm and dry. Neurological:      Mental Status: She is alert and oriented to person, place, and time. Psychiatric:         Mood and Affect: Mood normal.         Behavior: Behavior normal.                 An electronic signature was used to authenticate this note.     --TWIN Fish - CNP

## 2021-04-28 ENCOUNTER — OFFICE VISIT (OUTPATIENT)
Dept: PODIATRY | Age: 31
End: 2021-04-28
Payer: MEDICARE

## 2021-04-28 VITALS — WEIGHT: 260 LBS | BODY MASS INDEX: 43.32 KG/M2 | HEIGHT: 65 IN

## 2021-04-28 DIAGNOSIS — L60.0 OC (ONYCHOCRYPTOSIS): Primary | ICD-10-CM

## 2021-04-28 DIAGNOSIS — Q66.89 CLUBFOOT, CONGENITAL: ICD-10-CM

## 2021-04-28 DIAGNOSIS — M79.674 PAIN OF TOE OF RIGHT FOOT: ICD-10-CM

## 2021-04-28 DIAGNOSIS — M79.675 PAIN OF TOE OF LEFT FOOT: ICD-10-CM

## 2021-04-28 PROCEDURE — 99213 OFFICE O/P EST LOW 20 MIN: CPT | Performed by: PODIATRIST

## 2021-04-29 NOTE — PROGRESS NOTES
21     Veilka Hernandez    : 1990   Sex: female    Age: 36 y.o. Patient's PCP/Provider is:  Abby Oliver DO    Subjective:  Patient is seen today for follow-up regarding continued care regarding ingrown nails to multiple digital nails bilateral foot. Patient still having issues with ambulation due to her clubfoot deformities. Patient has been wearing her AFO bracing and good supportive shoe gear with all ambulatory activities. No other additional abnormalities noted at this time. Chief Complaint   Patient presents with    Nail Problem     nail care       ROS:  Const: Positives and pertinent negatives as per HPI. Musculo: Denies symptoms other than stated above. Neuro: Denies symptoms other than stated above. Skin: Denies symptoms other than stated above.     Current Medications:    Current Outpatient Medications:     albuterol sulfate  (90 Base) MCG/ACT inhaler, Inhale 2 puffs into the lungs every 6 hours as needed for Wheezing or Shortness of Breath, Disp: 1 Inhaler, Rfl: 2    busPIRone (BUSPAR) 5 MG tablet, TAKE ONE TABLET BY MOUTH TWO TIMES A DAY, Disp: 60 tablet, Rfl: 2    famotidine (PEPCID) 20 MG tablet, TAKE ONE TABLET BY MOUTH TWO TIMES A DAY, Disp: 60 tablet, Rfl: 0    lamoTRIgine (LAMICTAL) 25 MG tablet, TAKE ONE TABLET BY MOUTH TWO TIMES A DAY, Disp: 60 tablet, Rfl: 0    sertraline (ZOLOFT) 100 MG tablet, TAKE 1 & 1/2 TABLETS BY MOUTH EVERY DAY, Disp: 45 tablet, Rfl: 0    ibuprofen (ADVIL;MOTRIN) 800 MG tablet, TAKE ONE TABLET BY MOUTH EVERY 8 HOURS AS NEEDED FOR PAIN., Disp: 90 tablet, Rfl: 0    vitamin D-3 (CHOLECALCIFEROL) 125 MCG (5000 UT) TABS, Take 1 tablet by mouth daily, Disp: 30 tablet, Rfl: 5    calcitRIOL (ROCALTROL) 0.5 MCG capsule, Take 1 capsule by mouth 2 times daily, Disp: 180 capsule, Rfl: 3    levETIRAcetam (KEPPRA) 750 MG tablet, Take 1 tablet by mouth 2 times daily, Disp: 60 tablet, Rfl: 11    ondansetron (ZOFRAN-ODT) 4 MG disintegrating tablet, Take 1 tablet by mouth every 8 hours as needed for Nausea or Vomiting, Disp: 20 tablet, Rfl: 1    cetirizine (ZYRTEC) 10 MG tablet, TAKE ONE TABLET BY MOUTH DAILY, Disp: 30 tablet, Rfl: 3    calcium carbonate (CALCIUM 600) 600 MG TABS tablet, Take 2 tablet three times a day, Disp: 180 tablet, Rfl: 12    Guselkumab (TREMFYA SC), Inject into the skin, Disp: , Rfl:     clobetasol (TEMOVATE) 0.05 % cream, Apply topically 2 times daily Apply topically 2 times daily. , Disp: 30 g, Rfl: 5    Ciclopirox 1 % SHAM, USE EVERY OTHER DAY AS A SHAMPOO, Disp: 120 mL, Rfl: 5    Clobetasol Propionate Emulsion 0.05 % FOAM, APPLY TO RASH ON SCALP TWICE A DAY AS NEEDED, Disp: , Rfl: 5    Allergies: Allergies   Allergen Reactions    Aspirin Other (See Comments)       Vitals:    04/28/21 1451   Weight: 260 lb (117.9 kg)   Height: 5' 5\" (1.651 m)       Exam:  Vascular status unchanged. Ingrown nail issues are noted bilateral foot. No signs of infection noted to the digital regions bilateral foot. No maceration of webspaces noted bilateral foot. No plantar calluses or heel fissuring noted bilateral foot. But his changes are stable to both lower extremities. Diagnostic Studies:     No results found. Procedures:    None    Plan Per Assessment  Peng was seen today for nail problem. Diagnoses and all orders for this visit:    OC (onychocryptosis)    Pain of toe of right foot    Pain of toe of left foot    Clubfoot, congenital      1. Evaluation and management  2. Debridement ingrown nail issues were performed on today's visit. No nail matrix procedures performed on today's visit. 3. We did recommend patient continued use of her AFO devices bilaterally. 4. Patient will be followed up at a later date for continued evaluation and management. She was advised to call the office with any questions or concerns in the interim.       Seen By:    Nubia Vargas DPM    Electronically signed by Esau Bowen DPM on 4/29/2021 at 9:00 AM    This note was created using voice recognition software. The note was reviewed however may contain grammatical errors.

## 2021-05-07 ENCOUNTER — TELEPHONE (OUTPATIENT)
Dept: SLEEP CENTER | Age: 31
End: 2021-05-07

## 2021-06-03 DIAGNOSIS — F41.9 ANXIETY: ICD-10-CM

## 2021-06-03 DIAGNOSIS — F41.9 ANXIETY AND DEPRESSION: ICD-10-CM

## 2021-06-03 DIAGNOSIS — F32.A ANXIETY AND DEPRESSION: ICD-10-CM

## 2021-06-03 DIAGNOSIS — K21.9 GASTROESOPHAGEAL REFLUX DISEASE WITHOUT ESOPHAGITIS: ICD-10-CM

## 2021-06-04 RX ORDER — FAMOTIDINE 20 MG/1
TABLET, FILM COATED ORAL
Qty: 60 TABLET | Refills: 0 | Status: SHIPPED
Start: 2021-06-04 | End: 2021-09-27

## 2021-06-04 RX ORDER — SERTRALINE HYDROCHLORIDE 100 MG/1
TABLET, FILM COATED ORAL
Qty: 45 TABLET | Refills: 0 | Status: SHIPPED
Start: 2021-06-04 | End: 2021-08-16 | Stop reason: SDUPTHER

## 2021-06-04 RX ORDER — LAMOTRIGINE 25 MG/1
TABLET ORAL
Qty: 60 TABLET | Refills: 0 | Status: SHIPPED
Start: 2021-06-04 | End: 2021-09-27

## 2021-06-09 ENCOUNTER — TELEPHONE (OUTPATIENT)
Dept: FAMILY MEDICINE CLINIC | Age: 31
End: 2021-06-09

## 2021-06-11 ENCOUNTER — HOSPITAL ENCOUNTER (OUTPATIENT)
Age: 31
Discharge: HOME | End: 2021-06-11
Payer: MEDICARE

## 2021-06-11 DIAGNOSIS — R53.83 FATIGUE, UNSPECIFIED TYPE: ICD-10-CM

## 2021-06-11 DIAGNOSIS — G40.409 OTHER GENERALIZED EPILEPSY, NOT INTRACTABLE, WITHOUT STATUS EPILEPTICUS (HCC): ICD-10-CM

## 2021-06-11 DIAGNOSIS — R61 NIGHT SWEATS: ICD-10-CM

## 2021-06-11 LAB
ALBUMIN SERPL-MCNC: 4 G/DL (ref 3.5–5.2)
ALP BLD-CCNC: 47 U/L (ref 35–104)
ALT SERPL-CCNC: 68 U/L (ref 0–32)
ANION GAP SERPL CALCULATED.3IONS-SCNC: 11 MMOL/L (ref 7–16)
AST SERPL-CCNC: 68 U/L (ref 0–31)
BASOPHILS ABSOLUTE: 0.05 E9/L (ref 0–0.2)
BASOPHILS RELATIVE PERCENT: 0.5 % (ref 0–2)
BILIRUB SERPL-MCNC: 0.5 MG/DL (ref 0–1.2)
BUN BLDV-MCNC: 7 MG/DL (ref 6–20)
CALCIUM SERPL-MCNC: 8.7 MG/DL (ref 8.6–10.2)
CHLORIDE BLD-SCNC: 99 MMOL/L (ref 98–107)
CO2: 31 MMOL/L (ref 22–29)
CREAT SERPL-MCNC: 0.7 MG/DL (ref 0.5–1)
EOSINOPHILS ABSOLUTE: 0.3 E9/L (ref 0.05–0.5)
EOSINOPHILS RELATIVE PERCENT: 3.1 % (ref 0–6)
GFR AFRICAN AMERICAN: >60
GFR NON-AFRICAN AMERICAN: >60 ML/MIN/1.73
GLUCOSE BLD-MCNC: 113 MG/DL (ref 74–99)
HCT VFR BLD CALC: 42.7 % (ref 34–48)
HEMOGLOBIN: 14.3 G/DL (ref 11.5–15.5)
IMMATURE GRANULOCYTES #: 0.05 E9/L
IMMATURE GRANULOCYTES %: 0.5 % (ref 0–5)
LYMPHOCYTES ABSOLUTE: 2.46 E9/L (ref 1.5–4)
LYMPHOCYTES RELATIVE PERCENT: 25.4 % (ref 20–42)
MCH RBC QN AUTO: 30.4 PG (ref 26–35)
MCHC RBC AUTO-ENTMCNC: 33.5 % (ref 32–34.5)
MCV RBC AUTO: 90.7 FL (ref 80–99.9)
MONOCYTES ABSOLUTE: 0.58 E9/L (ref 0.1–0.95)
MONOCYTES RELATIVE PERCENT: 6 % (ref 2–12)
NEUTROPHILS ABSOLUTE: 6.23 E9/L (ref 1.8–7.3)
NEUTROPHILS RELATIVE PERCENT: 64.5 % (ref 43–80)
PDW BLD-RTO: 13.1 FL (ref 11.5–15)
PLATELET # BLD: 136 E9/L (ref 130–450)
PMV BLD AUTO: 11.9 FL (ref 7–12)
POTASSIUM SERPL-SCNC: 4 MMOL/L (ref 3.5–5)
RBC # BLD: 4.71 E12/L (ref 3.5–5.5)
SODIUM BLD-SCNC: 141 MMOL/L (ref 132–146)
TOTAL PROTEIN: 7.8 G/DL (ref 6.4–8.3)
TSH SERPL DL<=0.05 MIU/L-ACNC: 4.16 UIU/ML (ref 0.27–4.2)
WBC # BLD: 9.7 E9/L (ref 4.5–11.5)

## 2021-06-11 PROCEDURE — 85025 COMPLETE CBC W/AUTO DIFF WBC: CPT

## 2021-06-11 PROCEDURE — 80053 COMPREHEN METABOLIC PANEL: CPT

## 2021-06-11 PROCEDURE — 36415 COLL VENOUS BLD VENIPUNCTURE: CPT

## 2021-06-11 PROCEDURE — 80177 DRUG SCRN QUAN LEVETIRACETAM: CPT

## 2021-06-11 PROCEDURE — 84443 ASSAY THYROID STIM HORMONE: CPT

## 2021-06-14 LAB — KEPPRA: 2 UG/ML (ref 12–46)

## 2021-06-15 ENCOUNTER — TELEPHONE (OUTPATIENT)
Dept: FAMILY MEDICINE CLINIC | Age: 31
End: 2021-06-15

## 2021-06-25 ENCOUNTER — TELEPHONE (OUTPATIENT)
Dept: SLEEP CENTER | Age: 31
End: 2021-06-25

## 2021-06-25 NOTE — TELEPHONE ENCOUNTER
Called patient to cancel  Sleep study due to insurance denial.  Spoke to mother and explained to her appointment on July 6, 2021 is cancelled and we will send for HST referral and call her back at that time to set up appointment

## 2021-06-28 ENCOUNTER — TELEPHONE (OUTPATIENT)
Dept: SLEEP CENTER | Age: 31
End: 2021-06-28

## 2021-06-28 DIAGNOSIS — G47.19 EXCESSIVE DAYTIME SLEEPINESS: Primary | ICD-10-CM

## 2021-06-28 DIAGNOSIS — G47.33 OBSTRUCTIVE SLEEP APNEA (ADULT) (PEDIATRIC): ICD-10-CM

## 2021-07-07 ENCOUNTER — TELEPHONE (OUTPATIENT)
Dept: SLEEP CENTER | Age: 31
End: 2021-07-07

## 2021-07-07 ENCOUNTER — OFFICE VISIT (OUTPATIENT)
Dept: PODIATRY | Age: 31
End: 2021-07-07
Payer: MEDICARE

## 2021-07-07 VITALS — HEIGHT: 66 IN | BODY MASS INDEX: 41.78 KG/M2 | WEIGHT: 260 LBS

## 2021-07-07 DIAGNOSIS — Q66.89 CLUBFOOT, CONGENITAL: Primary | ICD-10-CM

## 2021-07-07 DIAGNOSIS — L60.0 OC (ONYCHOCRYPTOSIS): ICD-10-CM

## 2021-07-07 DIAGNOSIS — R26.2 DIFFICULTY WALKING: ICD-10-CM

## 2021-07-07 DIAGNOSIS — M25.372 INSTABILITY OF LEFT ANKLE JOINT: ICD-10-CM

## 2021-07-07 PROCEDURE — 99213 OFFICE O/P EST LOW 20 MIN: CPT | Performed by: PODIATRIST

## 2021-07-07 NOTE — TELEPHONE ENCOUNTER
Called the patient to reschedule HST to July 13, 2021 at 11:00 for HST.   Insurance still was not authorized, scheduled further out

## 2021-07-07 NOTE — PROGRESS NOTES
Patient in today for nail care. Patient does  have any complaints of pain at this time. Patient is having continued pain in her feet.   Patient's PCP is Lorene Lopez DO date of last ov 6/7/21          Rc Wood LPN

## 2021-07-07 NOTE — PROGRESS NOTES
21     Jonelle Gomez    : 1990   Sex: female    Age: 36 y.o. Patient's PCP/Provider is:  Yaneth Castillo DO    Subjective:  Patient is seen today for follow-up regarding continued care ingrown nail issues multiple digital nails bilateral foot. Patient is also in need of getting her custom AFO device adjusted left lower extremity secondary to her clubfoot issues. No other additional abnormalities noted at this time. Chief Complaint   Patient presents with    Nail Problem     nail care       ROS:  Const: Positives and pertinent negatives as per HPI. Musculo: Denies symptoms other than stated above. Neuro: Denies symptoms other than stated above. Skin: Denies symptoms other than stated above.     Current Medications:    Current Outpatient Medications:     famotidine (PEPCID) 20 MG tablet, TAKE ONE TABLET BY MOUTH TWO TIMES A DAY, Disp: 60 tablet, Rfl: 0    lamoTRIgine (LAMICTAL) 25 MG tablet, TAKE ONE TABLET BY MOUTH TWO TIMES A DAY, Disp: 60 tablet, Rfl: 0    sertraline (ZOLOFT) 100 MG tablet, take 1 & 1/2 tablets by mouth every day. , Disp: 45 tablet, Rfl: 0    albuterol sulfate  (90 Base) MCG/ACT inhaler, Inhale 2 puffs into the lungs every 6 hours as needed for Wheezing or Shortness of Breath, Disp: 1 Inhaler, Rfl: 2    busPIRone (BUSPAR) 5 MG tablet, TAKE ONE TABLET BY MOUTH TWO TIMES A DAY, Disp: 60 tablet, Rfl: 2    ibuprofen (ADVIL;MOTRIN) 800 MG tablet, TAKE ONE TABLET BY MOUTH EVERY 8 HOURS AS NEEDED FOR PAIN., Disp: 90 tablet, Rfl: 0    vitamin D-3 (CHOLECALCIFEROL) 125 MCG (5000 UT) TABS, Take 1 tablet by mouth daily, Disp: 30 tablet, Rfl: 5    calcitRIOL (ROCALTROL) 0.5 MCG capsule, Take 1 capsule by mouth 2 times daily, Disp: 180 capsule, Rfl: 3    levETIRAcetam (KEPPRA) 750 MG tablet, Take 1 tablet by mouth 2 times daily, Disp: 60 tablet, Rfl: 11    ondansetron (ZOFRAN-ODT) 4 MG disintegrating tablet, Take 1 tablet by mouth every 8 hours as needed for Nausea or Vomiting, Disp: 20 tablet, Rfl: 1    cetirizine (ZYRTEC) 10 MG tablet, TAKE ONE TABLET BY MOUTH DAILY, Disp: 30 tablet, Rfl: 3    calcium carbonate (CALCIUM 600) 600 MG TABS tablet, Take 2 tablet three times a day, Disp: 180 tablet, Rfl: 12    Guselkumab (TREMFYA SC), Inject into the skin, Disp: , Rfl:     clobetasol (TEMOVATE) 0.05 % cream, Apply topically 2 times daily Apply topically 2 times daily. , Disp: 30 g, Rfl: 5    Ciclopirox 1 % SHAM, USE EVERY OTHER DAY AS A SHAMPOO, Disp: 120 mL, Rfl: 5    Clobetasol Propionate Emulsion 0.05 % FOAM, APPLY TO RASH ON SCALP TWICE A DAY AS NEEDED, Disp: , Rfl: 5    Allergies: Allergies   Allergen Reactions    Aspirin Other (See Comments)       Vitals:    07/07/21 1443   Weight: 260 lb (117.9 kg)   Height: 5' 6\" (1.676 m)       Exam:  Neurovascular status unchanged. Ingrown nail issues noted to the digital regions bilateral foot. No signs of infection noted to the digital regions bilateral foot. No maceration of webspaces noted bilateral foot. No plantar calluses or heel fissuring noted bilateral foot. Clubfoot issues are stable bilateral lower extremities. Antalgic gait noted upon evaluation. Diagnostic Studies:     No results found. Procedures:    None    Plan Per Assessment  Peng was seen today for nail problem. Diagnoses and all orders for this visit:    Clubfoot, congenital  -     Amb External Referral For Orthotics    Instability of left ankle joint  -     Amb External Referral For Orthotics    Difficulty walking  -     Amb External Referral For Orthotics    OC (onychocryptosis)      1. Evaluation and management  2. Debridement ingrown nails was performed to patient tolerance. No nail matrix procedures performed on today's visit. 3. Patient and her mother were given a prescription to get her current AFO device evaluated and adjusted accordingly.   4. Patient will be followed up at a later date for continued evaluation and

## 2021-07-09 ENCOUNTER — TELEPHONE (OUTPATIENT)
Dept: FAMILY MEDICINE CLINIC | Age: 31
End: 2021-07-09

## 2021-07-09 DIAGNOSIS — G47.33 OSA (OBSTRUCTIVE SLEEP APNEA): Primary | ICD-10-CM

## 2021-07-09 NOTE — TELEPHONE ENCOUNTER
Referred back to [address] [place] [address]. She was seen there once and was to follow up but did not.

## 2021-07-09 NOTE — TELEPHONE ENCOUNTER
Received phone call from [address], At home sleep study was denied - peer to peer is optional, ph # [phone]

## 2021-07-19 ENCOUNTER — OFFICE VISIT (OUTPATIENT)
Dept: FAMILY MEDICINE CLINIC | Age: 31
End: 2021-07-19
Payer: MEDICARE

## 2021-07-19 VITALS
TEMPERATURE: 97.6 F | HEIGHT: 66 IN | SYSTOLIC BLOOD PRESSURE: 124 MMHG | BODY MASS INDEX: 43.07 KG/M2 | RESPIRATION RATE: 16 BRPM | HEART RATE: 92 BPM | WEIGHT: 268 LBS | DIASTOLIC BLOOD PRESSURE: 78 MMHG | OXYGEN SATURATION: 96 %

## 2021-07-19 DIAGNOSIS — E83.51 HYPOCALCEMIA: ICD-10-CM

## 2021-07-19 DIAGNOSIS — R26.2 DIFFICULTY WALKING: ICD-10-CM

## 2021-07-19 DIAGNOSIS — M79.605 BILATERAL LEG PAIN: ICD-10-CM

## 2021-07-19 DIAGNOSIS — E20.9 HYPOPARATHYROIDISM, UNSPECIFIED HYPOPARATHYROIDISM TYPE (HCC): ICD-10-CM

## 2021-07-19 DIAGNOSIS — M79.604 BILATERAL LEG PAIN: ICD-10-CM

## 2021-07-19 DIAGNOSIS — Z00.00 ROUTINE GENERAL MEDICAL EXAMINATION AT A HEALTH CARE FACILITY: Primary | ICD-10-CM

## 2021-07-19 PROCEDURE — G0439 PPPS, SUBSEQ VISIT: HCPCS | Performed by: NURSE PRACTITIONER

## 2021-07-19 RX ORDER — PHENOL 1.4 %
AEROSOL, SPRAY (ML) MUCOUS MEMBRANE
Qty: 180 TABLET | Refills: 3 | Status: SHIPPED
Start: 2021-07-19 | End: 2021-11-12 | Stop reason: SDUPTHER

## 2021-07-19 SDOH — ECONOMIC STABILITY: FOOD INSECURITY: WITHIN THE PAST 12 MONTHS, THE FOOD YOU BOUGHT JUST DIDN'T LAST AND YOU DIDN'T HAVE MONEY TO GET MORE.: NEVER TRUE

## 2021-07-19 SDOH — ECONOMIC STABILITY: FOOD INSECURITY: WITHIN THE PAST 12 MONTHS, YOU WORRIED THAT YOUR FOOD WOULD RUN OUT BEFORE YOU GOT MONEY TO BUY MORE.: NEVER TRUE

## 2021-07-19 ASSESSMENT — LIFESTYLE VARIABLES
HOW OFTEN DO YOU HAVE A DRINK CONTAINING ALCOHOL: NEVER
HOW OFTEN DO YOU HAVE A DRINK CONTAINING ALCOHOL: 0
AUDIT TOTAL SCORE: INCOMPLETE
HOW OFTEN DO YOU HAVE A DRINK CONTAINING ALCOHOL: 0
AUDIT-C TOTAL SCORE: INCOMPLETE

## 2021-07-19 ASSESSMENT — PATIENT HEALTH QUESTIONNAIRE - PHQ9
2. FEELING DOWN, DEPRESSED OR HOPELESS: 1
SUM OF ALL RESPONSES TO PHQ9 QUESTIONS 1 & 2: 2
SUM OF ALL RESPONSES TO PHQ QUESTIONS 1-9: 2
1. LITTLE INTEREST OR PLEASURE IN DOING THINGS: 1
SUM OF ALL RESPONSES TO PHQ QUESTIONS 1-9: 2
SUM OF ALL RESPONSES TO PHQ QUESTIONS 1-9: 2

## 2021-07-19 ASSESSMENT — SOCIAL DETERMINANTS OF HEALTH (SDOH): HOW HARD IS IT FOR YOU TO PAY FOR THE VERY BASICS LIKE FOOD, HOUSING, MEDICAL CARE, AND HEATING?: NOT HARD AT ALL

## 2021-07-19 NOTE — PATIENT INSTRUCTIONS
Personalized Preventive Plan for Floridalma Adams - 7/19/2021  Medicare offers a range of preventive health benefits. Some of the tests and screenings are paid in full while other may be subject to a deductible, co-insurance, and/or copay. Some of these benefits include a comprehensive review of your medical history including lifestyle, illnesses that may run in your family, and various assessments and screenings as appropriate. After reviewing your medical record and screening and assessments performed today your provider may have ordered immunizations, labs, imaging, and/or referrals for you. A list of these orders (if applicable) as well as your Preventive Care list are included within your After Visit Summary for your review. Other Preventive Recommendations:    · A preventive eye exam performed by an eye specialist is recommended every 1-2 years to screen for glaucoma; cataracts, macular degeneration, and other eye disorders. · A preventive dental visit is recommended every 6 months. · Try to get at least 150 minutes of exercise per week or 10,000 steps per day on a pedometer . · Order or download the FREE \"Exercise & Physical Activity: Your Everyday Guide\" from [employer]. Call 6-159.448.4313 or search [employer] online. · You need 7135-9764 mg of calcium and 1010-6796 IU of vitamin D per day. It is possible to meet your calcium requirement with diet alone, but a vitamin D supplement is usually necessary to meet this goal.  · When exposed to the sun, use a sunscreen that protects against both UVA and UVB radiation with an SPF of 30 or greater. Reapply every 2 to 3 hours or after sweating, drying off with a towel, or swimming. · Always wear a seat belt when traveling in a car. Always wear a helmet when riding a bicycle or motorcycle. 67 y/o female with h/o PAD, COPD not on home oxygen, ELEUTERIO not on CPAP, DM-2, HTN, psychiatric disorder sent in from [facility] as per podiatry referral for evaluation and treatment of Right foot gangrene and worsening pain    1- Gangrene 2nd right toe with osteomyelitis:    s/p R TMA 6/29  pt spiked a fever with tmax 102 on (7/1)and had wbc of 23.5 (7/2)  pending UA and Urine culture  blood culture pending -ve  per podiatry: Continue with local wound care, all packing to be removed tomorrow,  possible D/c wed with PO abx and if med stable.  pain control w/ oxycodone  c/w vanco and unasyn per ID  c/w dressing changes    2- PAD:    Lasix PO 20 daily  vascular following, no interventions for now      3- HTN, controlled  -DASH diet    4- COPD, stable  -f/u outpatient    5- DM    Lantus 20 , lispor 7/7/7  ISS and fingerstick monitoring  a1c 9.9, need to follow up with PMD    6- DVT ppx    -Lovenox    #Code status  -DNR/DNI    #Dispo  -from [facility], will go to [facility]

## 2021-07-19 NOTE — PROGRESS NOTES
Medicare Annual Wellness Visit  Name: Phillip Tracy Date: 2021   MRN: [id number] Sex: Female   Age: 36 y.o. Ethnicity: Non-/Non    : 1990 Race: [address] is here for Medicare AWV (here for awv)    Screenings for behavioral, psychosocial and functional/safety risks, and cognitive dysfunction are all negative except as indicated below. These results, as well as other patient data from the [address] form, are documented in Flowsheets linked to this Encounter. Allergies   Allergen Reactions    Aspirin Other (See Comments)         Prior to Visit Medications    Medication Sig Taking? Authorizing Provider   calcium carbonate (CALCIUM 600) 600 MG TABS tablet Take 2 tablet three times a day Yes TWIN Peralta CNP   famotidine (PEPCID) 20 MG tablet TAKE ONE TABLET BY MOUTH TWO TIMES A DAY Yes TWIN Peralta CNP   lamoTRIgine (LAMICTAL) 25 MG tablet TAKE ONE TABLET BY MOUTH TWO TIMES A DAY Yes TWIN Peralta CNP   sertraline (ZOLOFT) 100 MG tablet take 1 & 1/2 tablets by mouth every day. Yes TWIN Peralta CNP   albuterol sulfate  (90 Base) MCG/ACT inhaler Inhale 2 puffs into the lungs every 6 hours as needed for Wheezing or Shortness of Breath Yes TWIN Peralta CNP   busPIRone (BUSPAR) 5 MG tablet TAKE ONE TABLET BY MOUTH TWO TIMES A DAY Yes TWIN Peralta CNP   ibuprofen (ADVIL;MOTRIN) 800 MG tablet TAKE ONE TABLET BY MOUTH EVERY 8 HOURS AS NEEDED FOR PAIN.  Yes TWIN Peralta CNP   vitamin D-3 (CHOLECALCIFEROL) 125 MCG (5000 UT) TABS Take 1 tablet by mouth daily Yes TWIN Peralta CNP   calcitRIOL (ROCALTROL) 0.5 MCG capsule Take 1 capsule by mouth 2 times daily Yes TWIN Weiss CNP   levETIRAcetam (KEPPRA) 750 MG tablet Take 1 tablet by mouth 2 times daily Yes TWIN Yung   ondansetron (ZOFRAN-ODT) 4 MG disintegrating tablet Take 1 tablet by mouth every 8 hours as needed for Nausea or Vomiting Yes TWIN Madsen CNP   cetirizine (ZYRTEC) 10 MG tablet TAKE ONE TABLET BY MOUTH DAILY Yes TWIN Madsen CNP   Guselkumab (TREMFYA SC) Inject into the skin Yes Historical Provider, MD   clobetasol (TEMOVATE) 0.05 % cream Apply topically 2 times daily Apply topically 2 times daily. Yes TWIN Madsen CNP   Ciclopirox 1 % SHAM USE EVERY OTHER DAY AS A SHAMPOO Yes TWIN Madsen CNP   Clobetasol Propionate Emulsion 0.05 % FOAM APPLY TO RASH ON SCALP TWICE A DAY AS NEEDED Yes Historical Provider, MD         Past Medical History:   Diagnosis Date    Development delay     DiGeorge syndrome ([facility] [address]     Obesity     Velocardiofacial syndrome        Past Surgical History:   Procedure Laterality Date    CARDIAC SURGERY      CHOLECYSTECTOMY, LAPAROSCOPIC N/A 8/6/2020    CHOLECYSTECTOMY LAPAROSCOPIC WITH INTRAOPERATIVE CHOLANGIOGRAM performed by Patrick Roberto MD at [facility] History   Problem Relation Age of Onset    Diabetes Mother        CareTeam (Including outside providers/suppliers regularly involved in providing care):   Patient Care Team:  Melva Hodge DO as PCP - [address] [place] [address] Dr, APRN - CNP as PCP [facility]  Heath Hall MD as Consulting Physician (Cardiology)    Wt Readings from Last 3 Encounters:   07/19/21 268 lb (121.6 kg)   07/07/21 260 lb (117.9 kg)   04/28/21 260 lb (117.9 kg)     Vitals:    07/19/21 1034   BP: 124/78   Pulse: 92   Resp: 16   Temp: 97.6 °F (36.4 °C)   TempSrc: Temporal   SpO2: 96%   Weight: 268 lb (121.6 kg)   Height: 5' 6\" (1.676 m)     Body mass index is 43.26 kg/m². Based upon direct observation of the patient, evaluation of cognition reveals recent and remote memory intact.     General Appearance: alert and oriented to person, place and time, well developed and well- nourished, in no acute distress  Skin: warm and dry, psoriasis to scalp and hairline  Head: normocephalic and atraumatic  Eyes: pupils equal, round, and reactive to light, extraocular eye movements intact, conjunctivae normal  ENT: tympanic membrane, external ear and ear canal normal bilaterally, nose without deformity, nasal mucosa and turbinates normal without polyps, pharynx difficult to visualize due to thick tongue, congenital deformity  Neck: supple and non-tender without mass, no thyromegaly or thyroid nodules, no cervical lymphadenopathy  Pulmonary/Chest: clear to auscultation bilaterally- no wheezes, rales or rhonchi, normal air movement, no respiratory distress  Cardiovascular: normal rate, regular rhythm, normal S1 and S2, no murmurs, rubs, clicks, or gallops, distal pulses intact, no carotid bruits  Abdomen: soft, non-tender, non-distended, normal bowel sounds, no masses or organomegaly  Extremities: clubfoot on left, brace to left lower extremity. Non pitting edema to BLE  Musculoskeletal: normal range of motion, no joint swelling, deformity or tenderness  Neurologic: wide based gait, coordination and speech normal    Patient's complete Health Risk Assessment and screening values have been reviewed and are found in Flowsheets. The following problems were reviewed today and where indicated follow up appointments were made and/or referrals ordered. Positive Risk Factor Screenings with Interventions:     Fall Risk:  2 or more falls in past year?: (!) yes  Fall with injury in past year?: no  Fall Risk Interventions:    · Physical therapy referral ordered for strength and balance training          General Health and ACP:  General  In general, how would you say your health is?: Fair  In the past 7 days, have you experienced any of the following?  New or Increased Pain, New or Increased Fatigue, Loneliness, Social Isolation, Stress or Anger?: None of These  Do you get the social and emotional support that you need?: Yes  Do you have a Living Will?: (!) No  Advance Directives     Power of [employer] Living Will ACP-Advance Directive ACP-Power of     Not on File Filed on 03/26/13 Filed Not on File      General Health Risk Interventions:  · No Living Will: mother has guardianship    Health Habits/Nutrition:  Health Habits/Nutrition  Do you exercise for at least 20 minutes 2-3 times per week?: (!) No  Have you lost any weight without trying in the past 3 months?: No  Do you eat only one meal per day?: No  Have you seen the dentist within the past year?: Yes  Body mass index: (!) 43.25  Health Habits/Nutrition Interventions:  · Inadequate physical activity:  will start PT for stength na dbalance therapy    Hearing/Vision:  No exam data present  Hearing/Vision  Do you or your family notice any trouble with your hearing that hasn't been managed with hearing aids?: No  Do you have difficulty driving, watching TV, or doing any of your daily activities because of your eyesight?: (!) Yes  Have you had an eye exam within the past year?: Yes  Hearing/Vision Interventions:  · Vision concerns:  patient encouraged to make appointment with his/her eye specialist    Safety:  Safety  Do you have working smoke detectors?: (!) No  Have all throw rugs been removed or fastened?: (!) No  Do you have non-slip mats or surfaces in all bathtubs/showers?: Yes  Do all of your stairways have a railing or banister?: Yes  Are your doorways, halls and stairs free of clutter?: Yes  Do you always fasten your seatbelt when you are in a car?: Yes  Safety Interventions:  · Home safety tips provided    ADL:  ADLs  In the past 7 days, did you need help from others to perform any of the following everyday activities? Eating, dressing, grooming, bathing, toileting, or walking/balance?: (!) Eating, Dressing, Grooming, Bathing, Toileting, Walking/Balance  In the past 7 days, did you need help from others to take care of any of the following?  Laundry, housekeeping, banking/finances, shopping, telephone use, food preparation, transportation, or taking medications?: [employer], Housekeeping, Banking/Finances, Shopping, Telephone Use, Food Preparation, Transportation, Taking Medications  ADL Interventions:  · mother is caregiver    Personalized Preventive Plan   Current Health Maintenance Status  Immunization History   Administered Date(s) Administered    DTP 1990, 1990, 01/24/1994, 10/28/1998    HPV Bivalent (Cervarix) 07/27/2010    HPV Quadrivalent (Gardasil) 01/08/2009    Hepatitis B Ped/Adol (Engerix-B, Recombivax HB) 04/19/1994, 09/11/1996, 11/11/1997    Hib vaccine 1992, 1992, 01/24/1994    Influenza, Quadv, IM, PF (6 mo and older Fluzone, Flulaval, Fluarix, and 3 yrs and older Afluria) 10/15/2019    MMR 01/24/1994, 11/11/1997    Meningococcal MCV4P (Menactra) 10/01/2009    Pneumococcal Polysaccharide ([id number]) 07/21/2004    Polio Virus Vaccine 1992, 1992, 01/24/1994, 10/28/1998    Tdap (Boostrix, Adacel) 01/06/2009        Health Maintenance   Topic Date Due    Varicella vaccine (1 of 2 - 2-dose childhood series) Never done    HIV screen  Never done    Cervical cancer screen  Never done    DTaP/Tdap/Td vaccine (6 - Td or Tdap) 01/06/2019    Annual Wellness Visit (AWV)  Never done    A1C test (Diabetic or Prediabetic)  10/15/2020    Flu vaccine (1) 09/01/2021    Pneumococcal 0-64 years Vaccine (2 of 2 - PPSV23) 01/02/2057    Hepatitis B vaccine  Completed    Hib vaccine  Completed    Meningococcal (ACWY) vaccine  Completed    COVID-19 Vaccine  Completed    Hepatitis C screen  Completed    Hepatitis A vaccine  Aged Out     Recommendations for Preventive Services Due: see orders and patient instructions/AVS.  . Recommended screening schedule for the next 5-10 years is provided to the patient in written form: see Patient Jordi Brizuela was seen today for medicare awv.     Diagnoses and all orders for this visit:    Routine general medical examination at a [facility] facility    Hypocalcemia  -     calcium carbonate (CALCIUM 600) 600 MG TABS tablet; Take 2 tablet three times a day    Hypoparathyroidism, unspecified hypoparathyroidism type (HCC)  -     calcium carbonate (CALCIUM 600) 600 MG TABS tablet;  Take 2 tablet three times a day    Difficulty walking  -     [facility], Alin Rodriguez    Bilateral leg pain  -     [facility]

## 2021-07-20 ENCOUNTER — TELEPHONE (OUTPATIENT)
Dept: FAMILY MEDICINE CLINIC | Age: 31
End: 2021-07-20

## 2021-07-20 NOTE — TELEPHONE ENCOUNTER
Pharmacy called to verify Calcium carbonate. Can you please verify? Pharmacy just wanted to verify since not on med list before.

## 2021-08-09 ENCOUNTER — OFFICE VISIT (OUTPATIENT)
Dept: NEUROLOGY | Age: 31
End: 2021-08-09
Payer: MEDICARE

## 2021-08-09 VITALS
HEART RATE: 106 BPM | TEMPERATURE: 97.3 F | HEIGHT: 65 IN | OXYGEN SATURATION: 94 % | BODY MASS INDEX: 44.65 KG/M2 | DIASTOLIC BLOOD PRESSURE: 90 MMHG | WEIGHT: 268 LBS | SYSTOLIC BLOOD PRESSURE: 158 MMHG | RESPIRATION RATE: 18 BRPM

## 2021-08-09 DIAGNOSIS — D82.1 DIGEORGE SYNDROME (HCC): ICD-10-CM

## 2021-08-09 DIAGNOSIS — G40.409 OTHER GENERALIZED EPILEPSY, NOT INTRACTABLE, WITHOUT STATUS EPILEPTICUS (HCC): Primary | ICD-10-CM

## 2021-08-09 PROCEDURE — 99214 OFFICE O/P EST MOD 30 MIN: CPT | Performed by: CLINICAL NURSE SPECIALIST

## 2021-08-09 RX ORDER — LEVETIRACETAM 750 MG/1
750 TABLET ORAL 2 TIMES DAILY
Qty: 60 TABLET | Refills: 11 | Status: SHIPPED
Start: 2021-08-09 | End: 2022-03-15 | Stop reason: SDUPTHER

## 2021-08-09 NOTE — PROGRESS NOTES
Vielka Acosta is a 36 y.o. right handed female     This is a 36 y.o. woman who follows for seizures  The pt has a hx of DiGeorge syndrome with associated developmental delay and is with her mother today - who is an excellent historian. She reports seizures since birth - telling me she \"wasn't able to even hold her for hours because she wouldn't stop seizing\". She describes GTC events- her last one being April of 2017. At that time she was started on Keppra 500 mg BID. In 2018, her mother was still reporting \"stiffening spells\"   We increased her Keppra to 750mg BID and these stopped   No continued spells   EEG after increase was unrevealing     She is not sleeping well--- reportedly getting 5-6 hours per night. Sporadic and not restful   Snores frequently and \"makes sounds\"   But readily admits to a few hour nap everyday after work   We referred to sleep medicine which studies demonstrated JANELLE but she never followed up for mask testing/fitting     Lab studies reviewed from June still revealed minimally elevated AST/ALT  I also noted a very low Keppra level    Mom here readily admits to missing a few doses around the time that the labs were drawn however states that she is \"doing better lately\"    Also reports tremors -- both hands   Worsens as she gets anxious    Graduated from [employer] and attended [employer] for 4 years. She now attends [employer]. Medically, she is otherwise stable.     No chest pain or palpitations  No SOB  No vertigo, lightheadedness or loss of consciousness  No falls, tripping or stumbling  No incontinence of bowels or bladder  No itching or bruising appreciated  No numbness, tingling or focal arm/leg weakness    ROS otherwise negative     Allergies as of 08/09/2021 - Fully Reviewed 08/09/2021   Allergen Reaction Noted    Aspirin Other (See Comments) 03/25/2013     Objective:     BP (!) 158/90 (Site: Right Upper Arm, Position: Sitting, Cuff Size: Medium Adult)   Pulse 106   Temp 97.3 °F (36.3 °C)   Resp 18   Ht 5' 5\" (1.651 m)   Wt 268 lb (121.6 kg)   SpO2 94%   BMI 44.60 kg/m²   Afebrile     General Appearance: alert, cooperative, no distress, appears stated age    Extremities: no cyanosis or edema  Pulses: 1+ and symmetric all extremities -- left afo and knee stabilizing brace  Skin: no rashes or lesions     Mental Status: alert; oriented to self and place; pleasant and cooperative    Speech: hypernasal  speech  Language: intact - without aphasias     Cranial Nerves:  I: smell    II: visual acuity     II: visual fields Full   II: pupils JOAN   III,VII: ptosis None   III,IV,VI: extraocular muscles  EOMI without nystagmus    V: mastication Normal   V: facial light touch sensation  Normal   V,VII: corneal reflex  Present   VII: facial muscle function - upper     VII: facial muscle function - lower Normal   VIII: hearing Normal   IX: soft palate elevation  Normal   IX,X: gag reflex    XI: trapezius strength  5/5   XI: sternocleidomastoid strength 5/5   XI: neck extension strength  5/5   XII: tongue strength  Normal     Motor  5/5 throughout  Slightly decreased tone throughout  Overweight bulk    Sensory:  LT  normal  Vibration minimally decreased in ankles     Coordination:   FN, FFM and EUGENIO symmetrical     Gait:  Slow and slightly unsteady  Walks on toes mainly   Left afo     DTR:   BE throughout  No Celis's  No Babinski's  Bilateral grasp reflexes present     Laboratory/Radiology:     CBC with Differential:    Lab Results   Component Value Date    WBC 9.7 06/11/2021    RBC 4.71 06/11/2021    HGB 14.3 06/11/2021    HCT 42.7 06/11/2021     06/11/2021    MCV 90.7 06/11/2021    MCH 30.4 06/11/2021    MCHC 33.5 06/11/2021    RDW 13.1 06/11/2021    LYMPHOPCT 25.4 06/11/2021    MONOPCT 6.0 06/11/2021    BASOPCT 0.5 06/11/2021    MONOSABS 0.58 06/11/2021    LYMPHSABS 2.46 06/11/2021    EOSABS 0.30 06/11/2021    BASOSABS 0.05 06/11/2021     CMP:    Lab Results   Component Value Date     06/11/2021    K 4.0 06/11/2021    CL 99 06/11/2021    CO2 31 06/11/2021    BUN 7 06/11/2021    CREATININE 0.7 06/11/2021    GFRAA >60 06/11/2021    LABGLOM >60 06/11/2021    GLUCOSE 113 06/11/2021    PROT 7.8 06/11/2021    LABALBU 4.0 06/11/2021    CALCIUM 8.7 06/11/2021    BILITOT 0.5 06/11/2021    ALKPHOS 47 06/11/2021    AST 68 06/11/2021    ALT 68 06/11/2021     TSH:    Lab Results   Component Value Date    TSH 4.160 06/11/2021     Keppra level 2    AST/ALT 68    Heads CT 3/2016: No acute intracranial hemorrhage or midline shift. Left frontal scalp hematoma present without calvarial fracture. [s/p fall] Symmetric bilateral basal ganglia calcifications are present. The gray-white matter junctions are intact. No space-occupying intra-axial masses. 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 No opacification of the mastoid sinuses and middle ears. EEG - June 2018  Normal     Imaging, diagnostic tests and labs (from 2018) personally reviewed at the time of this office visit    Assessment: This is a 36 y.o. woman with a hx of DiGeorge syndrome     History of GTC seizures since birth - likely associated with developmental disabilities    Resolved with higher doses of Keppra -- now taking 750mg BID    Developmental disability    Related to DiGeorge syndrome     With her new headaches and history of snoring, I am concerned with underlying JANELLE   Needs to follow up with sleep medicine     Gait difficulties from above -- will have PT get involved     Medically, she is otherwise well.      Plan:     continue Keppra to 750 mg BID    RTO 5 months     Repeat Keppra level with next lab draw    TWIN Roberto - CNS, DNP, APRN  1:52 PM  8/9/2021

## 2021-08-16 DIAGNOSIS — F41.9 ANXIETY AND DEPRESSION: ICD-10-CM

## 2021-08-16 DIAGNOSIS — F32.A ANXIETY AND DEPRESSION: ICD-10-CM

## 2021-08-16 RX ORDER — SERTRALINE HYDROCHLORIDE 100 MG/1
TABLET, FILM COATED ORAL
Qty: 45 TABLET | Refills: 0 | Status: SHIPPED
Start: 2021-08-16 | End: 2022-02-09 | Stop reason: SDUPTHER

## 2021-09-09 NOTE — PROGRESS NOTES
[facility] Cardiology Progress Note  Dr. Mayelin Morrissey      Referring Physician: Anna Leon DO  CHIEF COMPLAINT:   Chief Complaint   Patient presents with    Cardiac Valve Problem     aNNUAL. Patient is having some CP and SOB-on exertion. HISTORY OF PRESENT ILLNESS:   Patient is 36year old female with history of DiGeorge syndrome, surgical repair of interrupted aortic arch and closure of ventricular septal defect, here for follow up. Right-sided chest pain, shortness of breath is at baseline, denies any lightheadedness or dizziness no palpitations, no pedal edema, no PND, no orthopnea, no syncope, no presyncopal episodes. Feels the same as she did a year ago  Very sedentary    Past Medical History:   Diagnosis Date    Development delay     DiGeorge syndrome ([facility] [address]     Obesity     Velocardiofacial syndrome          Past Surgical History:   Procedure Laterality Date    CARDIAC SURGERY      CHOLECYSTECTOMY, LAPAROSCOPIC N/A 8/6/2020    CHOLECYSTECTOMY LAPAROSCOPIC WITH INTRAOPERATIVE CHOLANGIOGRAM performed by Dayanna García MD at [address]         Current Outpatient Medications   Medication Sig Dispense Refill    sertraline (ZOLOFT) 100 MG tablet 1 1/2 tablets qd 45 tablet 0    levETIRAcetam (KEPPRA) 750 MG tablet Take 1 tablet by mouth 2 times daily 60 tablet 11    calcium carbonate (CALCIUM 600) 600 MG TABS tablet Take 2 tablet three times a day 180 tablet 3    famotidine (PEPCID) 20 MG tablet TAKE ONE TABLET BY MOUTH TWO TIMES A DAY 60 tablet 0    lamoTRIgine (LAMICTAL) 25 MG tablet TAKE ONE TABLET BY MOUTH TWO TIMES A DAY 60 tablet 0    albuterol sulfate  (90 Base) MCG/ACT inhaler Inhale 2 puffs into the lungs every 6 hours as needed for Wheezing or Shortness of Breath 1 Inhaler 2    busPIRone (BUSPAR) 5 MG tablet TAKE ONE TABLET BY MOUTH TWO TIMES A DAY 60 tablet 2    ibuprofen (ADVIL;MOTRIN) 800 MG tablet TAKE ONE TABLET BY MOUTH EVERY 8 HOURS AS NEEDED FOR PAIN.  90 tablet 0    vitamin D-3 (CHOLECALCIFEROL) 125 MCG (5000 UT) TABS Take 1 tablet by mouth daily 30 tablet 5    calcitRIOL (ROCALTROL) 0.5 MCG capsule Take 1 capsule by mouth 2 times daily 180 capsule 3    ondansetron (ZOFRAN-ODT) 4 MG disintegrating tablet Take 1 tablet by mouth every 8 hours as needed for Nausea or Vomiting 20 tablet 1    cetirizine (ZYRTEC) 10 MG tablet TAKE ONE TABLET BY MOUTH DAILY 30 tablet 3    Guselkumab (TREMFYA SC) Inject into the skin      clobetasol (TEMOVATE) 0.05 % cream Apply topically 2 times daily Apply topically 2 times daily. 30 g 5    Ciclopirox 1 % SHAM USE EVERY OTHER DAY AS A SHAMPOO 120 mL 5    Clobetasol Propionate Emulsion 0.05 % FOAM APPLY TO RASH ON SCALP TWICE A DAY AS NEEDED  5     No current facility-administered medications for this visit. Allergies as of 09/10/2021 - Fully Reviewed 09/10/2021   Allergen Reaction Noted    Aspirin Other (See Comments) 03/25/2013       Social History     Socioeconomic History    Marital status: Single     Spouse name: Not on file    Number of children: Not on file    Years of education: Not on file    Highest education level: Not on file   Occupational History    Not on file   Tobacco Use    Smoking status: Never Smoker    Smokeless tobacco: Never Used   Vaping Use    Vaping Use: Never used   Substance and Sexual Activity    Alcohol use: No     Alcohol/week: 0.0 standard drinks     Comment: Soda daily     Drug use: No    Sexual activity: Not Currently   Other Topics Concern    Not on file   Social History Narrative    Not on file     Social Determinants of Health     Financial Resource Strain: Low Risk     Difficulty of Paying Living Expenses: Not hard at all   Food Insecurity: No Food Insecurity    Worried About Running Out of Food in the Last Year: Never true    Beth of Food in the Last Year: Never true   Transportation Needs:     Lack of Transportation (Medical):      Lack of Transportation (Non-Medical): Physical Activity:     Days of Exercise per Week:     Minutes of Exercise per Session:    Stress:     Feeling of Stress :    Social Connections:     Frequency of Communication with Friends and Family:     Frequency of Social Gatherings with Friends and Family:     Attends Worship Services:     Active Member of Clubs or Organizations:     Attends Club or Organization Meetings:     Marital Status:    Intimate Partner Violence:     Fear of Current or Ex-Partner:     Emotionally Abused:     Physically Abused:     Sexually Abused:        Family History   Problem Relation Age of Onset    Diabetes Mother        REVIEW OF SYSTEMS:     CONSTITUTIONAL:  negative for  fevers, chills, sweats, + fatigue  HEENT:  negative for  tinnitus, earaches, nasal congestion and epistaxis  RESPIRATORY:  negative for  dry cough, cough with sputum, wheezing and hemoptysis  GASTROINTESTINAL:  negative for nausea, vomiting, diarrhea, constipation, pruritus and jaundice  HEMATOLOGIC/LYMPHATIC:  negative for easy bruising, bleeding, lymphadenopathy and petechiae  ENDOCRINE:  negative for heat intolerance, cold intolerance, tremor, hair loss and diabetic symptoms including neither polyuria nor polydipsia nor blurred vision  MUSCULOSKELETAL:  negative for  myalgias, arthralgias, joint swelling, stiff joints and decreased range of motion  NEUROLOGICAL:  negative for memory problems, speech problems, visual disturbance, dysphagia, weakness and numbness      PHYSICAL EXAM:   Constitutional:  Awake, alert cooperative, no apparent distress, and appears stated age. HEENT:  Moist and pink mucous membranes, normocephalic, without obvious abnormality, atraumatic, normal ears and nose.   Neck:  Supple, symmetrical, trachea midline, no JVD, no adenopathy, thyroid symmetric, not enlarged and no tenderness, good carotid upstroke bilaterally, no carotid bruit, skin normal  Lungs: No increased work of breathing, mildly decreased air exchange with diffuse scattered wheezing  Cardiovascular: Normal apical impulse, regular rate and rhythm, normal S1 and S2, no S3 or S4, 3/6 systolic murmur at the apex, 3/6 systolic ejection murmur at the right upper sternal border, no pedal edema, good carotid upstroke bilaterally, no carotid bruit, no JVD, no abdominal pulsating masses. Abdomen: Soft, nontender, no hepatomegaly, no splenomegaly, bowel sound positive. CHEST:  Expands symmetrically, nontender to palpation. Musculoskeletal:  No clubbing or cyanosis. No redness, warmth, or swelling of the joints. Neurological: Alert, awake, and oriented X3. /82 (Site: Right Upper Arm, Position: Sitting, Cuff Size: Large Adult)   Pulse 90   Ht 5' 5\" (1.651 m)   Wt 271 lb (122.9 kg)   BMI 45.10 kg/m²     DATA:   I personally reviewed the visit EKG with the following interpretation: Sinus rhythm, nonspecific lateral Q waves, nonspecific T wave changes    EKG  7/28/20 Sinus tachycardia nonspecific T wave changes    ECHO:9/1/2016) Mild concentric left ventricular hypertrophy. Ejection fraction is visually estimated at 50%. No regional wall motion abnormalities seen. Normal left ventricular diastolic filling pattern for age. Right ventricle global systolic function is mildly reduced. Mild aortic stenosis is present. Phsiologic and/or trace mitral regurgitation is present. Mild to moderate tricuspid regurgitation.     Cardiology Labs: BMP:    Lab Results   Component Value Date     06/11/2021    K 4.0 06/11/2021    CL 99 06/11/2021    CO2 31 06/11/2021    BUN 7 06/11/2021    CREATININE 0.7 06/11/2021     CMP:    Lab Results   Component Value Date     06/11/2021    K 4.0 06/11/2021    CL 99 06/11/2021    CO2 31 06/11/2021    BUN 7 06/11/2021    CREATININE 0.7 06/11/2021    PROT 7.8 06/11/2021     CBC:    Lab Results   Component Value Date    WBC 9.7 06/11/2021    RBC 4.71 06/11/2021    HGB 14.3 06/11/2021    HCT 42.7 06/11/2021    MCV 90.7

## 2021-09-10 ENCOUNTER — OFFICE VISIT (OUTPATIENT)
Dept: CARDIOLOGY CLINIC | Age: 31
End: 2021-09-10
Payer: MEDICARE

## 2021-09-10 VITALS
HEIGHT: 65 IN | HEART RATE: 90 BPM | WEIGHT: 271 LBS | DIASTOLIC BLOOD PRESSURE: 82 MMHG | BODY MASS INDEX: 45.15 KG/M2 | SYSTOLIC BLOOD PRESSURE: 124 MMHG

## 2021-09-10 DIAGNOSIS — Q21.10 ASD (ATRIAL SEPTAL DEFECT): Primary | ICD-10-CM

## 2021-09-10 PROCEDURE — 93000 ELECTROCARDIOGRAM COMPLETE: CPT | Performed by: INTERNAL MEDICINE

## 2021-09-10 PROCEDURE — 99213 OFFICE O/P EST LOW 20 MIN: CPT | Performed by: INTERNAL MEDICINE

## 2021-09-26 DIAGNOSIS — K21.9 GASTROESOPHAGEAL REFLUX DISEASE WITHOUT ESOPHAGITIS: ICD-10-CM

## 2021-09-26 DIAGNOSIS — F41.9 ANXIETY: ICD-10-CM

## 2021-09-27 RX ORDER — FAMOTIDINE 20 MG/1
TABLET, FILM COATED ORAL
Qty: 60 TABLET | Refills: 0 | Status: SHIPPED
Start: 2021-09-27 | End: 2022-01-28

## 2021-09-27 RX ORDER — LAMOTRIGINE 25 MG/1
TABLET ORAL
Qty: 60 TABLET | Refills: 0 | Status: SHIPPED
Start: 2021-09-27 | End: 2021-10-12

## 2021-09-27 RX ORDER — BUSPIRONE HYDROCHLORIDE 5 MG/1
TABLET ORAL
Qty: 60 TABLET | Refills: 0 | Status: SHIPPED
Start: 2021-09-27 | End: 2021-10-12

## 2021-10-12 ENCOUNTER — OFFICE VISIT (OUTPATIENT)
Dept: FAMILY MEDICINE CLINIC | Age: 31
End: 2021-10-12
Payer: MEDICARE

## 2021-10-12 VITALS
HEART RATE: 82 BPM | TEMPERATURE: 97.2 F | SYSTOLIC BLOOD PRESSURE: 122 MMHG | RESPIRATION RATE: 16 BRPM | DIASTOLIC BLOOD PRESSURE: 86 MMHG | OXYGEN SATURATION: 98 % | HEIGHT: 65 IN | BODY MASS INDEX: 44.98 KG/M2 | WEIGHT: 270 LBS

## 2021-10-12 DIAGNOSIS — R23.2 HOT FLASHES: ICD-10-CM

## 2021-10-12 DIAGNOSIS — G47.33 OSA (OBSTRUCTIVE SLEEP APNEA): ICD-10-CM

## 2021-10-12 DIAGNOSIS — Z00.00 ENCOUNTER FOR WELL ADULT EXAM WITHOUT ABNORMAL FINDINGS: ICD-10-CM

## 2021-10-12 DIAGNOSIS — N64.4 MASTODYNIA OF LEFT BREAST: ICD-10-CM

## 2021-10-12 DIAGNOSIS — D82.1 DIGEORGE SYNDROME (HCC): ICD-10-CM

## 2021-10-12 DIAGNOSIS — Z23 FLU VACCINE NEED: Primary | ICD-10-CM

## 2021-10-12 PROCEDURE — 90674 CCIIV4 VAC NO PRSV 0.5 ML IM: CPT | Performed by: FAMILY MEDICINE

## 2021-10-12 PROCEDURE — 99395 PREV VISIT EST AGE 18-39: CPT | Performed by: FAMILY MEDICINE

## 2021-10-12 PROCEDURE — G0008 ADMIN INFLUENZA VIRUS VAC: HCPCS | Performed by: FAMILY MEDICINE

## 2021-10-12 ASSESSMENT — ENCOUNTER SYMPTOMS
DIARRHEA: 0
BLOOD IN STOOL: 0
CONSTIPATION: 0
WHEEZING: 0

## 2021-10-12 NOTE — PROGRESS NOTES
Well Adult Note  Name: Enrique Cruz Date: 10/12/2021   MRN: [id number] Sex: Female   Age: 36 y.o. Ethnicity: Non- / Non    : 1990 Race: White (non-)      Scooter Presley is here for well adult exam.  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 HENT: Negative for ear discharge, ear pain, hearing loss, nosebleeds and tinnitus. Respiratory: Negative for wheezing. Cardiovascular: Negative for chest pain. Gastrointestinal: Negative for blood in stool, constipation and diarrhea. Genitourinary: Negative for dysuria, flank pain and hematuria. Skin: Negative for rash. Neurological: Negative for headaches. Hematological: Does not bruise/bleed easily. Allergies   Allergen Reactions    Aspirin Other (See Comments)         Prior to Visit Medications    Medication Sig Taking? Authorizing Provider   famotidine (PEPCID) 20 MG tablet TAKE ONE TABLET BY MOUTH TWO TIMES A DAY Yes TWIN Sanabria CNP   sertraline (ZOLOFT) 100 MG tablet 1 1/2 tablets qd Yes Mabel Myers DO   levETIRAcetam (KEPPRA) 750 MG tablet Take 1 tablet by mouth 2 times daily Yes TWIN Henao   calcium carbonate (CALCIUM 600) 600 MG TABS tablet Take 2 tablet three times a day Yes TWIN Sanabria CNP   albuterol sulfate  (90 Base) MCG/ACT inhaler Inhale 2 puffs into the lungs every 6 hours as needed for Wheezing or Shortness of Breath Yes TWIN Sanabria CNP   ibuprofen (ADVIL;MOTRIN) 800 MG tablet TAKE ONE TABLET BY MOUTH EVERY 8 HOURS AS NEEDED FOR PAIN.  Yes TWIN Sanabria CNP   vitamin D-3 (CHOLECALCIFEROL) 125 MCG (5000 UT) TABS Take 1 tablet by mouth daily Yes TWIN Sanabria CNP   calcitRIOL (ROCALTROL) 0.5 MCG capsule Take 1 capsule by mouth 2 times daily Yes TWIN Weiss CNP   ondansetron (ZOFRAN-ODT) 4 MG disintegrating tablet Take 1 tablet by mouth every 8 hours as needed for Nausea or Vomiting Yes Mayra Sat, APRN - CNP   cetirizine (ZYRTEC) 10 MG tablet TAKE ONE TABLET BY MOUTH DAILY Yes Mayra Sat, APRN - CNP   Guselkumab (TREMFYA SC) Inject into the skin Yes Historical Provider, MD   clobetasol (TEMOVATE) 0.05 % cream Apply topically 2 times daily Apply topically 2 times daily. Yes Mayra Sat, APRN - CNP   Ciclopirox 1 % SHAM USE EVERY OTHER DAY AS A SHAMPOO Yes Mayra Sat, APRN - CNP   Clobetasol Propionate Emulsion 0.05 % FOAM APPLY TO RASH ON SCALP TWICE A DAY AS NEEDED Yes Historical Provider, MD         Past Medical History:   Diagnosis Date    Development delay     DiGeorge syndrome ([facility] [address]     Obesity     Velocardiofacial syndrome        Past Surgical History:   Procedure Laterality Date    CARDIAC SURGERY      CHOLECYSTECTOMY, LAPAROSCOPIC N/A 8/6/2020    CHOLECYSTECTOMY LAPAROSCOPIC WITH INTRAOPERATIVE CHOLANGIOGRAM performed by Abhilash Malloy MD at [facility] History   Problem Relation Age of Onset    Diabetes Mother        Social History     Tobacco Use    Smoking status: Never Smoker    Smokeless tobacco: Never Used   Vaping Use    Vaping Use: Never used   Substance Use Topics    Alcohol use: No     Alcohol/week: 0.0 standard drinks     Comment: Soda daily     Drug use: No       Objective   /86   Pulse 82   Temp 97.2 °F (36.2 °C) (Temporal)   Resp 16   Ht 5' 5\" (1.651 m)   Wt 270 lb (122.5 kg)   SpO2 98%   BMI 44.93 kg/m²   Wt Readings from Last 3 Encounters:   10/12/21 270 lb (122.5 kg)   09/10/21 271 lb (122.9 kg)   08/09/21 268 lb (121.6 kg)       Physical Exam  Vitals reviewed: Generally obese. Constitutional:       General: She is not in acute distress. Appearance: She is well-developed. Eyes:      General: No scleral icterus. Neck:      Thyroid: No thyromegaly. Vascular: No JVD. Trachea: No tracheal deviation.    Cardiovascular:      Heart sounds: No gallop. Pulmonary:      Effort: No respiratory distress. Breath sounds: No wheezing. Genitourinary:     Comments: Left breast tender and no masses noted  Musculoskeletal:         General: No tenderness. Skin:     General: Skin is warm. Capillary Refill: Capillary refill takes less than 2 seconds. Findings: No erythema. Neurological:      Deep Tendon Reflexes: Reflexes normal.           Assessment   Plan   1. Flu vaccine need  -     INFLUENZA, MDCK QUADV, 2 YRS AND OLDER, IM, PF, PREFILL SYR OR SDV, 0.5ML (FLUCELVAX QUADV, PF)  2. DiGeorge syndrome ([facility] [address]  -     Baseline Diagnostic Sleep Study; Future  3. JANELLE (obstructive sleep apnea)  -     Baseline Diagnostic Sleep Study; Future  4. Hot flashes  -     LUTEINIZING HORMONE; Future  -     FOLLICLE STIMULATING HORMONE; Future  -     ESTRADIOL; Future  5. Mastodynia of left breast  -     US BREAST COMPLETE LEFT;  Future         Personalized Preventive Plan   Current Health Maintenance Status  Immunization History   Administered Date(s) Administered    COVID-19, Moderna, PF, 100mcg/0.5mL 02/03/2021, 03/03/2021    DTP 1990, 1990, 01/24/1994, 10/28/1998    HPV Bivalent (Cervarix) 07/27/2010    HPV Quadrivalent (Gardasil) 01/08/2009    Hepatitis B Ped/Adol (Engerix-B, Recombivax HB) 04/19/1994, 09/11/1996, 11/11/1997    Hib vaccine 1992, 1992, 01/24/1994    Influenza, MDCK Quadv, IM, PF (Flucelvax 2 yrs and older) 10/12/2021    Influenza, Tay Golas, IM, PF (6 mo and older Fluzone, Flulaval, Fluarix, and 3 yrs and older Afluria) 10/15/2019    MMR 01/24/1994, 11/11/1997    Meningococcal MCV4P (Menactra) 10/01/2009    Pneumococcal Polysaccharide (Hmyytiplf60) 07/21/2004    Polio Virus Vaccine 1992, 1992, 01/24/1994, 10/28/1998    Tdap (Boostrix, Adacel) 01/06/2009        Health Maintenance   Topic Date Due    Varicella vaccine (1 of 2 - 2-dose childhood series) Never done    HIV screen  Never done  Pap smear  Never done    DTaP/Tdap/Td vaccine (6 - Td or Tdap) 01/06/2019    A1C test (Diabetic or Prediabetic)  10/15/2020    Annual Wellness Visit (AWV)  07/20/2022    Pneumococcal 0-64 years Vaccine (2 of 2 - PPSV23) 01/02/2057    Hepatitis B vaccine  Completed    Hib vaccine  Completed    Meningococcal (ACWY) vaccine  Completed    Flu vaccine  Completed    COVID-19 Vaccine  Completed    Hepatitis C screen  Completed    Hepatitis A vaccine  Aged Out     Recommendations for Preventive Services Due: see orders and patient instructions/AVS.  .

## 2021-10-14 ENCOUNTER — OFFICE VISIT (OUTPATIENT)
Dept: PODIATRY | Age: 31
End: 2021-10-14
Payer: MEDICARE

## 2021-10-14 VITALS — BODY MASS INDEX: 44.98 KG/M2 | HEIGHT: 65 IN | WEIGHT: 270 LBS

## 2021-10-14 DIAGNOSIS — M79.674 PAIN OF TOE OF RIGHT FOOT: ICD-10-CM

## 2021-10-14 DIAGNOSIS — M79.675 PAIN OF TOE OF LEFT FOOT: ICD-10-CM

## 2021-10-14 DIAGNOSIS — I87.2 VENOUS INSUFFICIENCY (CHRONIC) (PERIPHERAL): ICD-10-CM

## 2021-10-14 DIAGNOSIS — L60.0 OC (ONYCHOCRYPTOSIS): Primary | ICD-10-CM

## 2021-10-14 DIAGNOSIS — R26.81 GAIT INSTABILITY: ICD-10-CM

## 2021-10-14 PROCEDURE — 99213 OFFICE O/P EST LOW 20 MIN: CPT | Performed by: PODIATRIST

## 2021-10-14 NOTE — PROGRESS NOTES
10/14/21     Veronica Denny    : 1990   Sex: female    Age: 36 y.o. Patient's PCP/Provider is:  Cordell Rojas DO    Subjective:  Patient is seen today for follow-up regarding continued evaluation regarding ingrown nail issues to both great toe regions. Patient also presents today with gait instability issues as well. Patient's mother stated she is going to follow-up with the scheduled physical therapy recommendation she was given per her PCP. No other additional abnormalities noted. Chief Complaint   Patient presents with    Nail Problem       ROS:  Const: Positives and pertinent negatives as per HPI. Musculo: Denies symptoms other than stated above. Neuro: Denies symptoms other than stated above. Skin: Denies symptoms other than stated above.     Current Medications:    Current Outpatient Medications:     famotidine (PEPCID) 20 MG tablet, TAKE ONE TABLET BY MOUTH TWO TIMES A DAY, Disp: 60 tablet, Rfl: 0    sertraline (ZOLOFT) 100 MG tablet, 1 1/2 tablets qd, Disp: 45 tablet, Rfl: 0    levETIRAcetam (KEPPRA) 750 MG tablet, Take 1 tablet by mouth 2 times daily, Disp: 60 tablet, Rfl: 11    calcium carbonate (CALCIUM 600) 600 MG TABS tablet, Take 2 tablet three times a day, Disp: 180 tablet, Rfl: 3    albuterol sulfate  (90 Base) MCG/ACT inhaler, Inhale 2 puffs into the lungs every 6 hours as needed for Wheezing or Shortness of Breath, Disp: 1 Inhaler, Rfl: 2    ibuprofen (ADVIL;MOTRIN) 800 MG tablet, TAKE ONE TABLET BY MOUTH EVERY 8 HOURS AS NEEDED FOR PAIN., Disp: 90 tablet, Rfl: 0    vitamin D-3 (CHOLECALCIFEROL) 125 MCG (5000 UT) TABS, Take 1 tablet by mouth daily, Disp: 30 tablet, Rfl: 5    calcitRIOL (ROCALTROL) 0.5 MCG capsule, Take 1 capsule by mouth 2 times daily, Disp: 180 capsule, Rfl: 3    ondansetron (ZOFRAN-ODT) 4 MG disintegrating tablet, Take 1 tablet by mouth every 8 hours as needed for Nausea or Vomiting, Disp: 20 tablet, Rfl: 1    cetirizine (ZYRTEC) 10 MG tablet, TAKE ONE TABLET BY MOUTH DAILY, Disp: 30 tablet, Rfl: 3    Guselkumab (TREMFYA SC), Inject into the skin, Disp: , Rfl:     clobetasol (TEMOVATE) 0.05 % cream, Apply topically 2 times daily Apply topically 2 times daily. , Disp: 30 g, Rfl: 5    Ciclopirox 1 % SHAM, USE EVERY OTHER DAY AS A SHAMPOO, Disp: 120 mL, Rfl: 5    Clobetasol Propionate Emulsion 0.05 % FOAM, APPLY TO RASH ON SCALP TWICE A DAY AS NEEDED, Disp: , Rfl: 5    Allergies: Allergies   Allergen Reactions    Aspirin Other (See Comments)       Vitals:    10/14/21 1034   Weight: 270 lb (122.5 kg)   Height: 5' 5\" (1.651 m)       Exam:  Neurovascular status unchanged. Ingrown nail issues noted multiple digital nails bilateral foot. No maceration of webspaces noted bilateral foot. No plantar calluses or ulcerations noted bilateral foot. Edematous issues noted to both lower extremities without ulcerations and/or infections noted bilaterally. Antalgic gait noted upon evaluation. Diagnostic Studies:     No results found. Procedures:    None    Plan Per Assessment  Peng was seen today for nail problem. Diagnoses and all orders for this visit:    OC (onychocryptosis)    Pain of toe of right foot    Pain of toe of left foot    Venous insufficiency (chronic) (peripheral)    Gait instability      1. Evaluation and management  2. Debridement ingrown nail issues was performed to patient tolerance. No nail matrix procedures performed on today's visit. 3. We did recommend continued use of her graduated compression garments on a daily basis to prevent any further issues from occurring. 4. Patient's mother was advised on the importance of scheduling the physical therapy sessions to help with her gait abnormalities. 5. Patient will be followed up at a later date for continued evaluation and management.       Seen By:    Humberto Bell DPM    Electronically signed by Humberto Bell DPM on 10/14/2021 at 12:03 PM    This note was created using voice recognition software. The note was reviewed however may contain grammatical errors.

## 2021-10-14 NOTE — PROGRESS NOTES
Patient in today for nail care. Patient does not have any complaints of pain at this time.  Patient's PCP is Nedra Alvarez, DO date of last ov  10/12/2021       Caleb Valdez LPN

## 2021-10-15 ENCOUNTER — TELEPHONE (OUTPATIENT)
Dept: SLEEP CENTER | Age: 31
End: 2021-10-15

## 2021-11-12 ENCOUNTER — TELEPHONE (OUTPATIENT)
Dept: FAMILY MEDICINE CLINIC | Age: 31
End: 2021-11-12

## 2021-11-12 DIAGNOSIS — E20.9 HYPOPARATHYROIDISM, UNSPECIFIED HYPOPARATHYROIDISM TYPE (HCC): ICD-10-CM

## 2021-11-12 DIAGNOSIS — E83.51 HYPOCALCEMIA: ICD-10-CM

## 2021-11-12 RX ORDER — PHENOL 1.4 %
AEROSOL, SPRAY (ML) MUCOUS MEMBRANE
Qty: 180 TABLET | Refills: 3 | Status: SHIPPED | OUTPATIENT
Start: 2021-11-12

## 2021-11-24 ENCOUNTER — TELEPHONE (OUTPATIENT)
Dept: FAMILY MEDICINE CLINIC | Age: 31
End: 2021-11-24

## 2021-11-24 RX ORDER — IBUPROFEN 800 MG/1
TABLET ORAL
Qty: 90 TABLET | Refills: 0 | Status: SHIPPED
Start: 2021-11-24 | End: 2022-07-14 | Stop reason: SDUPTHER

## 2021-12-01 ENCOUNTER — TELEPHONE (OUTPATIENT)
Dept: SLEEP CENTER | Age: 31
End: 2021-12-01

## 2021-12-02 ENCOUNTER — HOSPITAL ENCOUNTER (OUTPATIENT)
Dept: SLEEP CENTER | Age: 31
Discharge: HOME | End: 2021-12-02
Payer: MEDICARE

## 2021-12-02 DIAGNOSIS — G47.33 OSA (OBSTRUCTIVE SLEEP APNEA): Primary | ICD-10-CM

## 2021-12-02 PROCEDURE — 95810 POLYSOM 6/> YRS 4/> PARAM: CPT

## 2021-12-07 DIAGNOSIS — E55.9 VITAMIN D DEFICIENCY: ICD-10-CM

## 2021-12-07 RX ORDER — CHOLECALCIFEROL TAB 125 MCG (5000 UNIT) 125 MCG (5000 UT)
TAB
Qty: 30 TABLET | Refills: 0 | Status: SHIPPED
Start: 2021-12-07 | End: 2022-01-28

## 2021-12-09 NOTE — PROGRESS NOTES
[address] [place]                               SLEEP STUDY REPORT    PATIENT NAME: Twan Fierro                   :        1990  MED REC NO:   [id number]                            ROOM:  ACCOUNT NO:   [de-identified]                           ADMIT DATE: 2021  PROVIDER:     Boni Villafana MD    DATE OF STUDY:  2021    FULL-NIGHT POLYSOMNOGRAM REPORT    LOCATION:  [address] [place] [address]. REFERRING PROVIDER:  Chay Cordova DO    AGE: 36 yrs      SEX: Female          HEIGHT: 5 ft 5 in         WEIGHT: 260 lbs          BMI: 43.3 kg/m2    NECK CIRCUMFERENCE: 19.5 in    Symptoms: Excessive exam sleepiness, loud snoring, napping, witnessed apneas, restless legs. The Nelson Sleepiness Scale was 8 out of 24 (scores above or equal to 10 are suggestive of hypersomnolence). Indication: Suspected obstructive sleep apnea. Medical History: Morbid obesity, DiGeorge syndrome, developmental delay, seizures, GERD. Medications: Calcium, vitamin D, Keppra, sertraline, famotidine, Motrin, Calcitrol. DESCRIPTION: This full night polysomnogram consisted of EEG, EOG, EMG and 2-lead ECG monitoring. Oronasal airflow (nasal pressure transducer and thermistor), chest and abdominal efforts by respiratory inductance plethysmography or polyvinylidene fluoride (PVDF) sensor, and pulse oximetry were monitored as well. Hypopneas were scored as at least a 30% reduction in amplitude of the semi-quantitative flow signal, associated with a 4% or greater oxygen desaturation.  Respiratory effort related arousals (RERAs) were scored as at least a 30% reduction in amplitude of the semi-quantitative flow signal, associated with an EEG microarousal.     FINDINGS:  SLEEP CONTINUITY AND SLEEP ARCHITECTURE:  Lights were turned off at 9:14 PM and lights were turned on at 5:25 AM. Total recording time was 491 minutes and total sleep time was 380 minutes. The overall sleep efficiency was slightly reduced at 77%. Sleep onset latency was increased at 69 minutes and REM latency was increased to 39 minutes. There were 27 brief awakenings during this study. The duration of wakefulness after sleep onset was 43 minutes. The amount of N1 sleep was 6% of total sleep time, or 22 minutes. The amount of N2 sleep was 55% of total sleep time, or 210 minutes. The amount of REM sleep was 14% of total sleep time, or 53 minutes. Slow wave sleep was 25% of total sleep time, or 95 minutes. The microarousal index was increased to 21; arousals were spontaneous in nature and related to flow limited breathing (that did not necessarily qualify as overt respiratory events). RESPIRATORY MONITORING:  This study documented for obstructive apneas, 2 central apneas, 0 mixed apneas, 61 hypopneas, and 7 respiratory effort related arousals (RERAs) during the total recorded sleep time. The overall apnea/hypopnea index (AHI) was 10.6. The overall respiratory disturbance index (RDI includes RERAs) was 11.7. The non-REM RDI was 5 and the REM RDI was 53. The patient slept in the supine position for 42% of the total sleep time, and the supine RDI was 24. The patient slept in the left lateral position for 58% of total sleep time, and the left lateral RDI was 3. All REM sleep was in the supine position. The average oxyhemoglobin saturation was 91% while awake, 89% during non-REM sleep and 87% during REM sleep. The overall 4% oxygen desaturation index during sleep was 12. The lowest oxygen saturation during sleep was 71%, which occurred during REM sleep. Oxygen saturations were less than or equal to 88% for 113 minutes or 30% of the total sleep time. No supplemental oxygen was added during the study. Loud snoring was noted. EEG:  No abnormal epileptiform activity was observed. ECG: The electrocardiogram documented normal sinus rhythm.   The average heart rate during sleep was 83 beats per minute. PERIODIC LIMB MOVEMENTS: Frequent periodic limb movements were noted, most in association with flow limited breathing that did not rise the level of overt respiratory events. EMG/VIDEO MONITORING: Loss of REM atonia, bruxism, and parasomnias were not observed. IMPRESSION:   1. This study is consistent with mild obstructive sleep apnea that worsens significantly in supine and REM sleep, with hypoxia out of proportion to the degree of respiratory events. 2.  A split night study was not performed due to the mild nature of this patient's sleep disordered breathing. RECOMMENDATIONS:    1. A full night in-lab titration study is recommended to determine the optimal PAP therapy settings to effectively treat the patient's obstructive sleep apnea and ensure normalization of oxygen saturations. This should be ordered by the referring provider. 2.  Weight loss efforts should be encouraged, as the severity of obstructive sleep apnea may improve although no guarantee of cure can be made.         Stephen Bui MD    D: 12/08/2021 17:17:05       T: 12/08/2021 18:08:17     [id number]  Job#: [id number]     Doc#: [id number]

## 2021-12-16 ENCOUNTER — OFFICE VISIT (OUTPATIENT)
Dept: PODIATRY | Age: 31
End: 2021-12-16
Payer: MEDICARE

## 2021-12-16 VITALS — WEIGHT: 270 LBS | HEIGHT: 65 IN | BODY MASS INDEX: 44.98 KG/M2

## 2021-12-16 DIAGNOSIS — L60.0 OC (ONYCHOCRYPTOSIS): Primary | ICD-10-CM

## 2021-12-16 DIAGNOSIS — M79.675 PAIN OF TOE OF LEFT FOOT: ICD-10-CM

## 2021-12-16 DIAGNOSIS — M79.674 PAIN OF TOE OF RIGHT FOOT: ICD-10-CM

## 2021-12-16 DIAGNOSIS — R26.81 GAIT INSTABILITY: ICD-10-CM

## 2021-12-16 DIAGNOSIS — I87.2 VENOUS INSUFFICIENCY (CHRONIC) (PERIPHERAL): ICD-10-CM

## 2021-12-16 PROCEDURE — 99213 OFFICE O/P EST LOW 20 MIN: CPT | Performed by: PODIATRIST

## 2021-12-16 NOTE — PROGRESS NOTES
The patient is in today for routine nail care. She does report pain. PCP is Norman Peralta DO, last ov 10/12/21.

## 2021-12-17 NOTE — PROGRESS NOTES
21     Aris Brothers    : 1990   Sex: female    Age: 36 y.o. Patient's PCP/Provider is:  Puja Caicedo DO    Subjective:  Patient is seen today for follow-up regarding continued evaluation regarding chronic ingrown nails to multiple digital areas bilateral foot. She has been trying to wear her good supportive shoe gear as tolerated. No other additional abnormalities noted. Chief Complaint   Patient presents with    Nail Problem     nail care       ROS:  Const: Positives and pertinent negatives as per HPI. Musculo: Denies symptoms other than stated above. Neuro: Denies symptoms other than stated above. Skin: Denies symptoms other than stated above.     Current Medications:    Current Outpatient Medications:     NATURAL VITAMIN D-3 125 MCG (5000 UT) TABS tablet, TAKE ONE TABLET BY MOUTH DAILY, Disp: 30 tablet, Rfl: 0    ibuprofen (ADVIL;MOTRIN) 800 MG tablet, TAKE ONE TABLET BY MOUTH EVERY 8 HOURS AS NEEDED FOR PAIN., Disp: 90 tablet, Rfl: 0    calcium carbonate (CALCIUM 600) 600 MG TABS tablet, Take 2 tablet three times a day, Disp: 180 tablet, Rfl: 3    famotidine (PEPCID) 20 MG tablet, TAKE ONE TABLET BY MOUTH TWO TIMES A DAY, Disp: 60 tablet, Rfl: 0    sertraline (ZOLOFT) 100 MG tablet, 1 1/2 tablets qd, Disp: 45 tablet, Rfl: 0    levETIRAcetam (KEPPRA) 750 MG tablet, Take 1 tablet by mouth 2 times daily, Disp: 60 tablet, Rfl: 11    albuterol sulfate  (90 Base) MCG/ACT inhaler, Inhale 2 puffs into the lungs every 6 hours as needed for Wheezing or Shortness of Breath, Disp: 1 Inhaler, Rfl: 2    calcitRIOL (ROCALTROL) 0.5 MCG capsule, Take 1 capsule by mouth 2 times daily, Disp: 180 capsule, Rfl: 3    ondansetron (ZOFRAN-ODT) 4 MG disintegrating tablet, Take 1 tablet by mouth every 8 hours as needed for Nausea or Vomiting, Disp: 20 tablet, Rfl: 1    cetirizine (ZYRTEC) 10 MG tablet, TAKE ONE TABLET BY MOUTH DAILY, Disp: 30 tablet, Rfl: 3    Guselkumab (TREMFYA SC), Inject into the skin, Disp: , Rfl:     clobetasol (TEMOVATE) 0.05 % cream, Apply topically 2 times daily Apply topically 2 times daily. , Disp: 30 g, Rfl: 5    Ciclopirox 1 % SHAM, USE EVERY OTHER DAY AS A SHAMPOO, Disp: 120 mL, Rfl: 5    Clobetasol Propionate Emulsion 0.05 % FOAM, APPLY TO RASH ON SCALP TWICE A DAY AS NEEDED, Disp: , Rfl: 5    Allergies: Allergies   Allergen Reactions    Aspirin Other (See Comments)       Vitals:    12/16/21 1504   Weight: 270 lb (122.5 kg)   Height: 5' 5\" (1.651 m)       Exam:  Neurovascular status unchanged. Multiple ingrown nail issues noted to both feet. No signs of infection noted to the digital regions bilateral foot. No maceration webspaces noted bilateral foot. Edematous issues stable bilateral lower extremities. Diagnostic Studies:     No results found. Procedures:    None    Plan Per Assessment  Peng was seen today for nail problem. Diagnoses and all orders for this visit:    OC (onychocryptosis)    Pain of toe of right foot    Pain of toe of left foot    Venous insufficiency (chronic) (peripheral)    Gait instability      1. Evaluation and management  2. Debridement ingrown nails was performed to patient tolerance. No nail matrix procedures performed on today's visit. 3. We did discuss continued use of her compression garments on both lower extremities. Shoe recommendations and continued use of her AFO devices were recommended as well. 4. Patient will be followed up at a later date for continued evaluation and management. Seen By:    Christopher Santana DPM    Electronically signed by Christopher Santana DPM on 12/16/2021 at 8:42 PM    This note was created using voice recognition software. The note was reviewed however may contain grammatical errors.

## 2021-12-27 PROCEDURE — 95810 POLYSOM 6/> YRS 4/> PARAM: CPT | Performed by: INTERNAL MEDICINE

## 2022-01-28 DIAGNOSIS — K21.9 GASTROESOPHAGEAL REFLUX DISEASE WITHOUT ESOPHAGITIS: ICD-10-CM

## 2022-01-28 DIAGNOSIS — E55.9 VITAMIN D DEFICIENCY: ICD-10-CM

## 2022-01-28 RX ORDER — FAMOTIDINE 20 MG/1
TABLET, FILM COATED ORAL
Qty: 60 TABLET | Refills: 0 | Status: SHIPPED
Start: 2022-01-28 | End: 2022-03-14

## 2022-01-28 RX ORDER — CHOLECALCIFEROL TAB 125 MCG (5000 UNIT) 125 MCG (5000 UT)
TAB
Qty: 30 TABLET | Refills: 0 | Status: SHIPPED
Start: 2022-01-28 | End: 2022-05-13 | Stop reason: SDUPTHER

## 2022-02-09 ENCOUNTER — TELEPHONE (OUTPATIENT)
Dept: FAMILY MEDICINE CLINIC | Age: 32
End: 2022-02-09

## 2022-02-09 DIAGNOSIS — F32.A ANXIETY AND DEPRESSION: ICD-10-CM

## 2022-02-09 DIAGNOSIS — F41.9 ANXIETY AND DEPRESSION: ICD-10-CM

## 2022-02-09 RX ORDER — SERTRALINE HYDROCHLORIDE 100 MG/1
TABLET, FILM COATED ORAL
Qty: 45 TABLET | Refills: 0 | Status: SHIPPED
Start: 2022-02-09 | End: 2022-03-14

## 2022-02-09 NOTE — TELEPHONE ENCOUNTER
Sertraline  1 1/2 tab daily    Xander Melvin      Next appt is 2/14/2022  Last appt was 10/12/2021      Peng's mom said she called for the refill last week and again yesterday but it hasn't been called in. She said she's been without for a week.

## 2022-02-09 NOTE — TELEPHONE ENCOUNTER
No record in her chart showing any call was received  Unsure if she reached [facility] and the note was not sent to us?

## 2022-02-14 ENCOUNTER — OFFICE VISIT (OUTPATIENT)
Dept: FAMILY MEDICINE CLINIC | Age: 32
End: 2022-02-14
Payer: MEDICARE

## 2022-02-14 VITALS
BODY MASS INDEX: 44.32 KG/M2 | DIASTOLIC BLOOD PRESSURE: 82 MMHG | TEMPERATURE: 97 F | HEIGHT: 65 IN | RESPIRATION RATE: 18 BRPM | WEIGHT: 266 LBS | SYSTOLIC BLOOD PRESSURE: 128 MMHG | HEART RATE: 94 BPM | OXYGEN SATURATION: 96 %

## 2022-02-14 DIAGNOSIS — E11.9 TYPE 2 DIABETES MELLITUS WITHOUT COMPLICATION, WITHOUT LONG-TERM CURRENT USE OF INSULIN (HCC): ICD-10-CM

## 2022-02-14 DIAGNOSIS — G47.33 OSA (OBSTRUCTIVE SLEEP APNEA): Primary | ICD-10-CM

## 2022-02-14 LAB — HBA1C MFR BLD: 6.6 %

## 2022-02-14 PROCEDURE — 83036 HEMOGLOBIN GLYCOSYLATED A1C: CPT | Performed by: FAMILY MEDICINE

## 2022-02-14 PROCEDURE — 99214 OFFICE O/P EST MOD 30 MIN: CPT | Performed by: FAMILY MEDICINE

## 2022-02-14 ASSESSMENT — ENCOUNTER SYMPTOMS
VOMITING: 0
WHEEZING: 0
NAUSEA: 0
BLOOD IN STOOL: 0
ABDOMINAL PAIN: 0
SHORTNESS OF BREATH: 0
COUGH: 0
DIARRHEA: 0
CONSTIPATION: 0

## 2022-02-14 NOTE — PROGRESS NOTES
Rohan Johns (:  1990) is a 29 y.o. female,Established patient, here for evaluation of the following chief complaint(s):  Nausea (intermittent with some sweating) and Abdominal Pain (started today)         ASSESSMENT/PLAN:  1. JANELLE (obstructive sleep apnea)  -     Sleep Study with PAP Titration; Future  2. Type 2 diabetes mellitus without complication, without long-term current use of insulin (HCC)  -     POCT glycosylated hemoglobin (Hb A1C)      No follow-ups on file. Subjective   SUBJECTIVE/OBJECTIVE:  Nausea (intermittent with some sweating) and Abdominal Pain (started today)  Diaphoresis      Review of Systems   Constitutional: Negative for chills, diaphoresis and fever. HENT: Negative for ear discharge, ear pain, hearing loss, nosebleeds and tinnitus. Respiratory: Negative for cough, shortness of breath and wheezing. Cardiovascular: Negative for chest pain. Gastrointestinal: Negative for abdominal pain (LUQ), blood in stool, constipation, diarrhea, nausea and vomiting. Genitourinary: Negative for dysuria, flank pain and hematuria. Musculoskeletal: Positive for arthralgias (wrists). Negative for myalgias. Skin: Negative for rash. Neurological: Negative for headaches. Hematological: Does not bruise/bleed easily. Psychiatric/Behavioral: Negative for hallucinations and suicidal ideas. Objective   /82   Pulse 94   Temp 97 °F (36.1 °C)   Resp 18   Ht 5' 5\" (1.651 m)   Wt 266 lb (120.7 kg)   SpO2 96%   BMI 44.26 kg/m²   Lab Results   Component Value Date    LABA1C 6.1 10/15/2019    LABA1C 5.9 03/10/2017     Physical Exam  Constitutional:       General: She is not in acute distress. Appearance: She is well-developed. Eyes:      General: No scleral icterus. Neck:      Thyroid: No thyromegaly. Vascular: No JVD. Trachea: No tracheal deviation. Cardiovascular:      Rate and Rhythm: Regular rhythm. Heart sounds: No gallop.     Pulmonary: Effort: No respiratory distress. Breath sounds: No wheezing. Abdominal:      Palpations: Abdomen is soft. Comments: Obese     Musculoskeletal:         General: No tenderness. Comments: obesity   Skin:     General: Skin is warm. Capillary Refill: Capillary refill takes less than 2 seconds. Findings: No erythema. Neurological:      Deep Tendon Reflexes: Reflexes normal.   Psychiatric:      Comments: She likes Caffeine            On this date 2/14/2022 I have spent 34 minutes reviewing previous notes, test results and face to face with the patient discussing the diagnosis and importance of compliance with the treatment plan as well as documenting on the day of the visit. An electronic signature was used to authenticate this note.     --Christianne Shen, DO

## 2022-02-17 ENCOUNTER — PROCEDURE VISIT (OUTPATIENT)
Dept: PODIATRY | Age: 32
End: 2022-02-17
Payer: MEDICARE

## 2022-02-17 VITALS — BODY MASS INDEX: 44.32 KG/M2 | HEIGHT: 65 IN | WEIGHT: 266 LBS

## 2022-02-17 DIAGNOSIS — M79.674 PAIN OF TOE OF RIGHT FOOT: ICD-10-CM

## 2022-02-17 DIAGNOSIS — L60.0 OC (ONYCHOCRYPTOSIS): Primary | ICD-10-CM

## 2022-02-17 DIAGNOSIS — M79.675 PAIN OF TOE OF LEFT FOOT: ICD-10-CM

## 2022-02-17 DIAGNOSIS — I87.2 VENOUS INSUFFICIENCY (CHRONIC) (PERIPHERAL): ICD-10-CM

## 2022-02-17 DIAGNOSIS — R26.2 DIFFICULTY WALKING: ICD-10-CM

## 2022-02-17 PROCEDURE — 99213 OFFICE O/P EST LOW 20 MIN: CPT | Performed by: PODIATRIST

## 2022-02-17 NOTE — PROGRESS NOTES
Patient in today for nail care. Patient does not have any complaints of pain at this time.  Patient's PCP is Winnie Ochoa DO date of last ov  2/14/2022       Kathrine Garrett LPN

## 2022-02-17 NOTE — PROGRESS NOTES
22     Niles Skiff    : 1990   Sex: female    Age: 29 y.o. Patient's PCP/Provider is:  Maru Atkins DO    Subjective:  Patient is seen today for follow-up regarding continued evaluation regarding ingrown nails multiple digital nails bilateral foot. Patient has been trying to wear her bracing as instructed with noted improvement in her gait. She denies any recent injuries or changes in activities to both lower extremities. No other additional abnormalities noted at this time. Chief Complaint   Patient presents with    Nail Problem       ROS:  Const: Positives and pertinent negatives as per HPI. Musculo: Denies symptoms other than stated above. Neuro: Denies symptoms other than stated above. Skin: Denies symptoms other than stated above.     Current Medications:    Current Outpatient Medications:     sertraline (ZOLOFT) 100 MG tablet, 1 1/2 tablets qd, Disp: 45 tablet, Rfl: 0    famotidine (PEPCID) 20 MG tablet, TAKE ONE TABLET BY MOUTH TWO TIMES A DAY, Disp: 60 tablet, Rfl: 0    NATURAL VITAMIN D-3 125 MCG (5000 UT) TABS tablet, TAKE ONE TABLET BY MOUTH DAILY, Disp: 30 tablet, Rfl: 0    ibuprofen (ADVIL;MOTRIN) 800 MG tablet, TAKE ONE TABLET BY MOUTH EVERY 8 HOURS AS NEEDED FOR PAIN., Disp: 90 tablet, Rfl: 0    calcium carbonate (CALCIUM 600) 600 MG TABS tablet, Take 2 tablet three times a day, Disp: 180 tablet, Rfl: 3    levETIRAcetam (KEPPRA) 750 MG tablet, Take 1 tablet by mouth 2 times daily, Disp: 60 tablet, Rfl: 11    albuterol sulfate  (90 Base) MCG/ACT inhaler, Inhale 2 puffs into the lungs every 6 hours as needed for Wheezing or Shortness of Breath, Disp: 1 Inhaler, Rfl: 2    calcitRIOL (ROCALTROL) 0.5 MCG capsule, Take 1 capsule by mouth 2 times daily, Disp: 180 capsule, Rfl: 3    ondansetron (ZOFRAN-ODT) 4 MG disintegrating tablet, Take 1 tablet by mouth every 8 hours as needed for Nausea or Vomiting, Disp: 20 tablet, Rfl: 1    cetirizine (ZYRTEC) 10 MG tablet, TAKE ONE TABLET BY MOUTH DAILY, Disp: 30 tablet, Rfl: 3    Guselkumab (TREMFYA SC), Inject into the skin, Disp: , Rfl:     clobetasol (TEMOVATE) 0.05 % cream, Apply topically 2 times daily Apply topically 2 times daily. , Disp: 30 g, Rfl: 5    Ciclopirox 1 % SHAM, USE EVERY OTHER DAY AS A SHAMPOO, Disp: 120 mL, Rfl: 5    Clobetasol Propionate Emulsion 0.05 % FOAM, APPLY TO RASH ON SCALP TWICE A DAY AS NEEDED, Disp: , Rfl: 5    Allergies: Allergies   Allergen Reactions    Aspirin Other (See Comments)       Vitals:    02/17/22 1503   Weight: 266 lb (120.7 kg)   Height: 5' 5\" (1.651 m)       Exam:  Neurovascular status unchanged. Ingrown nail issues noted to multiple digital nails bilateral foot. No signs of infection noted to the digital nail regions bilateral foot. No maceration webspaces noted bilateral foot. Edematous issues noted to both lower extremities with stasis skin changes present. Antalgic gait noted upon evaluation. Diagnostic Studies:     No results found. Procedures:    None    Plan Per Assessment  Peng was seen today for nail problem. Diagnoses and all orders for this visit:    OC (onychocryptosis)    Pain of toe of right foot    Pain of toe of left foot    Venous insufficiency (chronic) (peripheral)    Difficulty walking      1. Evaluation and management  2. Debridement ingrown nails was performed to patient tolerance. No nail matrix procedures performed on today's visit. 3. We did discuss appropriate foot and lower leg extremity techniques to prevent potential issues from occurring. 4. Patient is to continue with her graduated compression garments and bracing with all ambulatory activities. 5. Patient will be followed up at a later date for continued evaluation and management. Seen By:    Cindy David DPM    Electronically signed by Cindy David DPM on 2/17/2022 at 3:29 PM    This note was created using voice recognition software.   The note was reviewed however may contain grammatical errors.

## 2022-02-21 ENCOUNTER — TELEPHONE (OUTPATIENT)
Dept: SLEEP CENTER | Age: 32
End: 2022-02-21

## 2022-03-13 DIAGNOSIS — F32.A ANXIETY AND DEPRESSION: ICD-10-CM

## 2022-03-13 DIAGNOSIS — K21.9 GASTROESOPHAGEAL REFLUX DISEASE WITHOUT ESOPHAGITIS: ICD-10-CM

## 2022-03-13 DIAGNOSIS — F41.9 ANXIETY AND DEPRESSION: ICD-10-CM

## 2022-03-14 RX ORDER — SERTRALINE HYDROCHLORIDE 100 MG/1
TABLET, FILM COATED ORAL
Qty: 45 TABLET | Refills: 2 | Status: SHIPPED
Start: 2022-03-14 | End: 2022-08-22

## 2022-03-14 RX ORDER — FAMOTIDINE 20 MG/1
TABLET, FILM COATED ORAL
Qty: 60 TABLET | Refills: 2 | Status: SHIPPED
Start: 2022-03-14 | End: 2022-10-03

## 2022-03-15 ENCOUNTER — OFFICE VISIT (OUTPATIENT)
Dept: NEUROLOGY | Age: 32
End: 2022-03-15
Payer: MEDICARE

## 2022-03-15 VITALS
OXYGEN SATURATION: 95 % | RESPIRATION RATE: 28 BRPM | TEMPERATURE: 98.7 F | HEIGHT: 65 IN | SYSTOLIC BLOOD PRESSURE: 139 MMHG | DIASTOLIC BLOOD PRESSURE: 89 MMHG | HEART RATE: 111 BPM | BODY MASS INDEX: 44.32 KG/M2 | WEIGHT: 266 LBS

## 2022-03-15 DIAGNOSIS — M25.562 ACUTE PAIN OF LEFT KNEE: ICD-10-CM

## 2022-03-15 DIAGNOSIS — D82.1 DIGEORGE SYNDROME (HCC): ICD-10-CM

## 2022-03-15 DIAGNOSIS — G40.409 OTHER GENERALIZED EPILEPSY, NOT INTRACTABLE, WITHOUT STATUS EPILEPTICUS (HCC): Primary | ICD-10-CM

## 2022-03-15 PROCEDURE — 99214 OFFICE O/P EST MOD 30 MIN: CPT | Performed by: CLINICAL NURSE SPECIALIST

## 2022-03-15 RX ORDER — LEVETIRACETAM 750 MG/1
750 TABLET ORAL 2 TIMES DAILY
Qty: 60 TABLET | Refills: 11 | Status: SHIPPED | OUTPATIENT
Start: 2022-03-15

## 2022-03-15 NOTE — PROGRESS NOTES
95%   BMI 44.26 kg/m²   Afebrile     General Appearance: alert, cooperative, no distress, appears stated age    Extremities: no cyanosis or edema  Pulses: 1+ and symmetric all extremities -- left afo and knee stabilizing brace  Skin: no rashes or lesions     Grimacing to palpation of left knee    Mental Status: alert; oriented to self and place; pleasant and cooperative    Speech: hypernasal  speech  Language: intact - without aphasias     Cranial Nerves:  I: smell    II: visual acuity     II: visual fields Full   II: pupils JOAN   III,VII: ptosis None   III,IV,VI: extraocular muscles  EOMI without nystagmus    V: mastication Normal   V: facial light touch sensation  Normal   V,VII: corneal reflex  Present   VII: facial muscle function - upper     VII: facial muscle function - lower Normal   VIII: hearing Normal   IX: soft palate elevation  Normal   IX,X: gag reflex    XI: trapezius strength  5/5   XI: sternocleidomastoid strength 5/5   XI: neck extension strength  5/5   XII: tongue strength  Normal     Motor  5/5 throughout  Slightly decreased tone throughout  Overweight bulk    Sensory:  LT  normal  Vibration minimally decreased in ankles     Coordination:   FN, FFM and EUGENIO symmetrical     Gait:  Antalgic    DTR:   BE throughout  No Celis's  No Babinski's  Bilateral grasp reflexes present     Laboratory/Radiology:     CBC with Differential:    Lab Results   Component Value Date    WBC 9.7 06/11/2021    RBC 4.71 06/11/2021    HGB 14.3 06/11/2021    HCT 42.7 06/11/2021     06/11/2021    MCV 90.7 06/11/2021    MCH 30.4 06/11/2021    MCHC 33.5 06/11/2021    RDW 13.1 06/11/2021    LYMPHOPCT 25.4 06/11/2021    MONOPCT 6.0 06/11/2021    BASOPCT 0.5 06/11/2021    MONOSABS 0.58 06/11/2021    LYMPHSABS 2.46 06/11/2021    EOSABS 0.30 06/11/2021    BASOSABS 0.05 06/11/2021     CMP:    Lab Results   Component Value Date     06/11/2021    K 4.0 06/11/2021    CL 99 06/11/2021    CO2 31 06/11/2021    BUN 7 06/11/2021    CREATININE 0.7 06/11/2021    GFRAA >60 06/11/2021    LABGLOM >60 06/11/2021    GLUCOSE 113 06/11/2021    PROT 7.8 06/11/2021    LABALBU 4.0 06/11/2021    CALCIUM 8.7 06/11/2021    BILITOT 0.5 06/11/2021    ALKPHOS 47 06/11/2021    AST 68 06/11/2021    ALT 68 06/11/2021     TSH:    Lab Results   Component Value Date    TSH 4.160 06/11/2021     Keppra level 2    AST/ALT 68    Heads CT 3/2016: No acute intracranial hemorrhage or midline shift. Left frontal scalp hematoma present without calvarial fracture. [s/p fall] Symmetric bilateral basal ganglia calcifications are present. The gray-white matter junctions are intact. No space-occupying intra-axial masses. 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 No opacification of the mastoid sinuses and middle ears. EEG - June 2018  Normal     Imaging, diagnostic tests and labs (from 2018) personally reviewed at the time of this office visit    Assessment:     hx of DiGeorge syndrome     History of GTC seizures since birth - likely associated with developmental disabilities    Resolved with higher doses of Keppra -- now taking 750mg BID    Developmental disability    Related to DiGeorge syndrome     With her new headaches and history of snoring, I am concerned with related to her mild JANELLE   Needs to follow up with sleep medicine-CPAP titration pending    Gait difficulties from above -- will have PT get involved     Medically, she is otherwise well.      Plan:     continue Keppra to 750 mg BID    Referral to her orthopedic surgeon    RTO 5 months     T[facility] [place] - CNS, DNP  3:27 PM  3/15/2022

## 2022-03-28 DIAGNOSIS — M25.562 ACUTE PAIN OF LEFT KNEE: Primary | ICD-10-CM

## 2022-03-29 ENCOUNTER — OFFICE VISIT (OUTPATIENT)
Dept: ORTHOPEDIC SURGERY | Age: 32
End: 2022-03-29
Payer: MEDICARE

## 2022-03-29 VITALS — HEIGHT: 65 IN | TEMPERATURE: 98 F | BODY MASS INDEX: 44.32 KG/M2 | WEIGHT: 266 LBS

## 2022-03-29 DIAGNOSIS — M17.12 PRIMARY OSTEOARTHRITIS OF LEFT KNEE: Primary | ICD-10-CM

## 2022-03-29 PROCEDURE — 99203 OFFICE O/P NEW LOW 30 MIN: CPT | Performed by: ORTHOPAEDIC SURGERY

## 2022-03-29 PROCEDURE — 20610 DRAIN/INJ JOINT/BURSA W/O US: CPT | Performed by: ORTHOPAEDIC SURGERY

## 2022-03-29 RX ORDER — TRIAMCINOLONE ACETONIDE 40 MG/ML
40 INJECTION, SUSPENSION INTRA-ARTICULAR; INTRAMUSCULAR ONCE
Status: COMPLETED | OUTPATIENT
Start: 2022-03-29 | End: 2022-03-29

## 2022-03-29 RX ADMIN — TRIAMCINOLONE ACETONIDE 40 MG: 40 INJECTION, SUSPENSION INTRA-ARTICULAR; INTRAMUSCULAR at 15:37

## 2022-03-29 NOTE — PROGRESS NOTES
Chief Complaint   Patient presents with    Knee Pain     Left Knee x years, no knonw injury, no previous left knee surgery. Does wear a knee brace. Subjective:     Patient ID: Amandeep Doan is a 29 y.o..  female    Knee Pain  Patient complains of left knee pain. This is evaluated as a personal injury. There was a history of injury. The patient reports a knee injury 10+ years ago. The pain began 10 years ago. The pain is located medial, lateral, anterior. She describes  Her symptoms as aching, sharp and throbbing. She has not experienced popping, clicking, locking, and giving way in the affected knee. The patient has had pain with kneeling, squating, and climbing stairs. Symptoms improve with rest, ice, brace. The symptoms are worse with activity, stair climbing, kneeling. The knee has not given out or felt unstable. The patient can bend and straighten the knee fully. The patient is active in none. Treatment to date has been ice, knee sleeve/brace, without significant relief. The patient is not working. The patients occupation is disabled.      Past Medical History:   Diagnosis Date    Development delay     DiGeorge syndrome (HCC)     Obesity     Velocardiofacial syndrome      Past Surgical History:   Procedure Laterality Date    CARDIAC SURGERY      CHOLECYSTECTOMY, LAPAROSCOPIC N/A 8/6/2020    CHOLECYSTECTOMY LAPAROSCOPIC WITH INTRAOPERATIVE CHOLANGIOGRAM performed by Juan Miguel Medrano MD at [address]       Current Outpatient Medications:     levETIRAcetam (KEPPRA) 750 MG tablet, Take 1 tablet by mouth 2 times daily, Disp: 60 tablet, Rfl: 11    sertraline (ZOLOFT) 100 MG tablet, TAKE 1 AND 1/2 TABLETS BY MOUTH DAILY, Disp: 45 tablet, Rfl: 2    famotidine (PEPCID) 20 MG tablet, TAKE ONE TABLET BY MOUTH TWO TIMES A DAY, Disp: 60 tablet, Rfl: 2    NATURAL VITAMIN D-3 125 MCG (5000 UT) TABS tablet, TAKE ONE TABLET BY MOUTH DAILY, Disp: 30 tablet, Rfl: 0    ibuprofen (ADVIL;MOTRIN) 800 MG tablet, TAKE ONE TABLET BY MOUTH EVERY 8 HOURS AS NEEDED FOR PAIN., Disp: 90 tablet, Rfl: 0    calcium carbonate (CALCIUM 600) 600 MG TABS tablet, Take 2 tablet three times a day, Disp: 180 tablet, Rfl: 3    albuterol sulfate  (90 Base) MCG/ACT inhaler, Inhale 2 puffs into the lungs every 6 hours as needed for Wheezing or Shortness of Breath, Disp: 1 Inhaler, Rfl: 2    calcitRIOL (ROCALTROL) 0.5 MCG capsule, Take 1 capsule by mouth 2 times daily, Disp: 180 capsule, Rfl: 3    ondansetron (ZOFRAN-ODT) 4 MG disintegrating tablet, Take 1 tablet by mouth every 8 hours as needed for Nausea or Vomiting, Disp: 20 tablet, Rfl: 1    cetirizine (ZYRTEC) 10 MG tablet, TAKE ONE TABLET BY MOUTH DAILY, Disp: 30 tablet, Rfl: 3    Guselkumab (TREMFYA SC), Inject into the skin, Disp: , Rfl:     clobetasol (TEMOVATE) 0.05 % cream, Apply topically 2 times daily Apply topically 2 times daily. , Disp: 30 g, Rfl: 5    Ciclopirox 1 % SHAM, USE EVERY OTHER DAY AS A SHAMPOO, Disp: 120 mL, Rfl: 5    Clobetasol Propionate Emulsion 0.05 % FOAM, APPLY TO RASH ON SCALP TWICE A DAY AS NEEDED, Disp: , Rfl: 5  Allergies   Allergen Reactions    Aspirin Other (See Comments)     Social History     Socioeconomic History    Marital status: Single     Spouse name: Not on file    Number of children: Not on file    Years of education: Not on file    Highest education level: Not on file   Occupational History    Not on file   Tobacco Use    Smoking status: Never Smoker    Smokeless tobacco: Never Used   Vaping Use    Vaping Use: Never used   Substance and Sexual Activity    Alcohol use: No     Alcohol/week: 0.0 standard drinks     Comment: Soda daily     Drug use: No    Sexual activity: Not Currently   Other Topics Concern    Not on file   Social History Narrative    Not on file     Social Determinants of Health     Financial Resource Strain: Low Risk     Difficulty of Paying Living Expenses: Not hard at all   Food (-) blood in stool, (-) gastric ulcer. Psychiatric: (-) Depression, (-) Anxiety, (-) bipolar disease, (-) Alzheimer's Disease  Allergic/Immunologic: (-) allergies latex, (-) allergies metal, (-) skin sensitivity. Hematlogic: (-) anemia, (-) blood transfusion, (-) DVT/PE, (-) Clotting disorders    Subjective:    Constitution:  Temp 98 °F (36.7 °C)   Ht 5' 5\" (1.651 m)   Wt 266 lb (120.7 kg)   BMI 44.26 kg/m²     Psycihatric:  The patient is alert and oriented x 3, appears to be stated age and in no distress. Respiratory:  Respiratory effort is not labored. Patient is not gasping. Palpation of the chest reveals no tactile fremitus. Skin:  Upon inspection: the skin appears warm, dry and intact. There is  a previous scar over the affected area. There is any cellulitis, lymphedema or cutaneous lesions noted in the lower extremities. Upon palpation there is no induration noted. Neurologic:  Gait: antalgic; Motor exam of the lower extremities show ; quadriceps, hamstrings, foot dorsi and plantar flexors intact R.  5/5 and L. 5/5. Deep tendon reflexes are 2/4 at the knees and 2/4 at the ankles with strong extensor hallicus longus motor strength bilaterally. Sensory to both feet is intact to all sensory roots. Cardiovascular: The vascular exam is normal and is well perfused to distal extremities. Distal pulses DP/PT: R. 2+; L. 2+. There is cap refill noted less than two seconds in all digits. There is not edema of the bilateral lower extremities. There is not varicosities noted in the distal extremities. Lymph:  Upon palpation,  there is no lymphadenopathy noted in bilateral lower extremities. Musculoskeletal:  Gait: antalgic; examination of the nails and digits reveal no cyanosis or clubbing. Lumbar exam:  On visual inspection, there is not deformity of the spine. full range of motion, no tenderness, palpable spasm or pain on motion.  Special tests: Straight Leg Raise negative, Ashanti test negative. Hip exam:   Upon inspection, there is not deformity noted. Upon palpation there is not tenderness. ROM: is  full and symmetrical.   Strength: Hip Flexors 5/5; Hip Abductors 5/5; Hip Adduction 5/5. Knee exam:  Left knee exam shows;  range of motion of R. Knee is 0 to 120, and L. Knee is 0 to 120. The patient does have  pain on motion, effusion is mild, there is tenderness over the  medial, lateral, anterior region, there are not any masses, there is not ligamentous instability, there is not  deformity noted. Knee exam: left positive for moderate crepitations, some mild tenderness laxity is not noted with stress. There is not a popliteal cyst.    R. Knee:  Lachman's negative, Anterior Drawer negative, Posterior Drawer negative  Pola's negative, Thallasy  negative,   PF grind test negative, Apprehension test negative, Patellar J sign  negative  L. Knee:  Lachman's negative, Anterior Drawer negative, Posterior Drawer negative  Pola's positive, Thallasy  positive,   PF grind test positive, Apprehension test negative,  Patellar J sign  negative    Xray Exam:  Mild tricompartmental joint narrowing  Radiographic findings reviewed with patient    Assessment:  No diagnosis found. Plan:  Natural history and expected course discussed. Questions answered. Educational materials distributed. Rest, ice, compression, and elevation (RICE) therapy. Reduction in offending activity. Continue brace  PT     I will proceed with a cortisone injection in the Left knee. Verbal and written consent was obtained for the injections. The skin was prepped with alcohol. 1mL of Kenalog 40mg and 9mL of 0.25% Marcaine was  injected to Left knee. The injection was given through the lateral side of the knee. The patient tolerated the injection well. I will see the patient back 6 wks.

## 2022-04-19 ENCOUNTER — EVALUATION (OUTPATIENT)
Dept: PHYSICAL THERAPY | Age: 32
End: 2022-04-19
Payer: MEDICARE

## 2022-04-19 DIAGNOSIS — M17.12 PRIMARY OSTEOARTHRITIS OF LEFT KNEE: Primary | ICD-10-CM

## 2022-04-19 PROCEDURE — 97163 PT EVAL HIGH COMPLEX 45 MIN: CPT | Performed by: PHYSICAL THERAPIST

## 2022-04-19 NOTE — PROGRESS NOTES
[facility] OUTPATIENT REHABILITATION  PHYSICAL THERAPY INITIAL EVALUATION         Date:  2022   Patient: Dario Perry  : 1990  MRN: [id number]  Referring Provider: Aylin Silva DO  [address]     Medical Diagnosis:   M17.12 (ICD-10-CM) - Primary osteoarthritis of left knee    Physician Order: Eval and Treat      SUBJECTIVE:     Onset date: many years hx L knee pain    Onset: Insidious      History / Mechanism of Injury: long hx of L knee pain w/ dislocation of patella    Interventions for current problem: knee brace 2 months and cortisone injections    Chief complaint: pain, difficulty walking, decreased balance     Behavior: condition is getting worse knee pain, balance is the same    Pain: constant  Current: 5/10        Symptom Type / Quality: dull, aching, sharp  Location[de-identified] Knee: global    Aggravated by: walking    Relieved by: reduced weightbearing    Imaging results: XR KNEE LEFT (MIN 4 VIEWS)    Result Date: 3/30/2022  EXAMINATION: FOUR XRAY VIEWS OF THE LEFT KNEE 3/29/2022 2:14 pm COMPARISON: None HISTORY: ORDERING SYSTEM PROVIDED HISTORY: Acute pain of left knee FINDINGS: Four views of the left knee reveal minimal narrowing of the medial compartment. There is mild spurring of the patella. No significant joint effusion. Minimal narrowing of the medial patellar facet with osteophyte formation seen of the patella. Mild to moderate degenerative changes seen of the left knee predominately within the patellofemoral joint space with no significant joint effusion. Mild narrowing of the medial compartment.        Past Medical History  Past Medical History:   Diagnosis Date    Development delay     DiGeorge syndrome (HCC)     Obesity     Velocardiofacial syndrome      Past Surgical History:   Procedure Laterality Date    CARDIAC SURGERY      CHOLECYSTECTOMY, LAPAROSCOPIC N/A 2020    CHOLECYSTECTOMY LAPAROSCOPIC WITH INTRAOPERATIVE CHOLANGIOGRAM performed by Arielle Wren MD at [address]       Medications:   Current Outpatient Medications   Medication Sig Dispense Refill    levETIRAcetam (KEPPRA) 750 MG tablet Take 1 tablet by mouth 2 times daily 60 tablet 11    sertraline (ZOLOFT) 100 MG tablet TAKE 1 AND 1/2 TABLETS BY MOUTH DAILY 45 tablet 2    famotidine (PEPCID) 20 MG tablet TAKE ONE TABLET BY MOUTH TWO TIMES A DAY 60 tablet 2    NATURAL VITAMIN D-3 125 MCG (5000 UT) TABS tablet TAKE ONE TABLET BY MOUTH DAILY 30 tablet 0    ibuprofen (ADVIL;MOTRIN) 800 MG tablet TAKE ONE TABLET BY MOUTH EVERY 8 HOURS AS NEEDED FOR PAIN. 90 tablet 0    calcium carbonate (CALCIUM 600) 600 MG TABS tablet Take 2 tablet three times a day 180 tablet 3    albuterol sulfate  (90 Base) MCG/ACT inhaler Inhale 2 puffs into the lungs every 6 hours as needed for Wheezing or Shortness of Breath 1 Inhaler 2    calcitRIOL (ROCALTROL) 0.5 MCG capsule Take 1 capsule by mouth 2 times daily 180 capsule 3    ondansetron (ZOFRAN-ODT) 4 MG disintegrating tablet Take 1 tablet by mouth every 8 hours as needed for Nausea or Vomiting 20 tablet 1    cetirizine (ZYRTEC) 10 MG tablet TAKE ONE TABLET BY MOUTH DAILY 30 tablet 3    Guselkumab (TREMFYA SC) Inject into the skin      clobetasol (TEMOVATE) 0.05 % cream Apply topically 2 times daily Apply topically 2 times daily. 30 g 5    Ciclopirox 1 % SHAM USE EVERY OTHER DAY AS A SHAMPOO 120 mL 5    Clobetasol Propionate Emulsion 0.05 % FOAM APPLY TO RASH ON SCALP TWICE A DAY AS NEEDED  5     No current facility-administered medications for this visit. Occupation: disability. Exercise regimen: none    Patient Goals: pain relief, improved balance, get stronger legs    Precautions / Contraindications: falls risk    OBJECTIVE:     Estimated body mass index is 44.26 kg/m² as calculated from the following:    Height as of 3/29/22: 5' 5\" (1.651 m). Weight as of 3/29/22: 266 lb (120.7 kg). Observations: well nourished female      Inspection: normal orthopedic exam    Edema: none     Gait: unsteady, wide-based, drop foot left    Joint/Motion: AFO L    Knee:  Right:   AROM: 124° Flexion,  -10° Extension  PROM: 126° Flexion,  0° Extension  Left:   AROM: 110° Flexion,  -10° Extension  PROM: 118° Flexion,  0° Extension     Strength:    Knee:   Right: Flexion 4+/5,  Extension 4+/5  Left: Flexion 4+/5,  Extension 4+/5    Palpation: Tender to palpation medial joint line, medial femoral condyle, patella. Special Tests/Functional Screens:    [] Lachman's []+ / [] -    [] Anterior Drawer []+ / [] -   [x] Valgus Stress []+ / [x] -  [] Thessaly Test []+ / [] -   [x] Nicolás's Sign: []+ / [] -  [] Other: []+ / [] - [] Bounce Home []+ / [] -   [] Pola []+ / [] -   [] Pivot Shift []+ / [] -   [] Posterior Drawer []+ / [] -   [x] Varus Stress []+ / [x] -        Sit to stand 30 seconds: 8 reps, this markedly low for a 29year old female and indicates high risk of falling     ASSESSMENT     Peng has marked L knee pain that contributes to her decreased balance. She is moving less and getting weaker also. We will initiate strengthening and balance exercises.     Outcome Measure:   Lower Extremity Functional Scale (LEFS) 52% impairment     Problems:   Pain 10/10 constant, waxing / waning  ROM decreased  Strength decreased   Limitations with walking, stairs      [x] There are no barriers affecting plan of care or recovery    [] Barriers to this patient's plan of care or recovery include:     Domestic Concerns:  [x] No  [] Yes:    Short Term goals (2-3 weeks)   Pain 5/10   ROM increased to near symmetrical   Strength 5-/5    No falls and decreased episodes of LOB    Long Term goals (4-6 weeks)   Pain 2-3/10 intermittent   ROM symmetrical w/o pain   Strength 5/5    Sit to stand 30 seconds: 13 reps   Able to perform / complete the following functions / tasks: normal gait   Independent with home exercise program (HEP)    Rehab Potential: [x] Good  [] Fair  [] Poor    PLAN     Time: 8320-0323   45 minutes    Treatment Plan:   instruction in home exercise program   therapeutic exercise   therapeutic activity   neuromuscular re-education   gait training   balance training     The following CPT codes are likely to be used in the care of this patient:   93819 PT Evaluation: High Complexity   00110 Therapeutic Exercise   21125 Neuromuscular Re-Education   37755 Therapeutic Activities   55027 Gait Training     Suggested Professional Referral: [x] No  [] Yes:     Patient Education:  [x] Plans / Goals, Risks / Benefits discussed  [x] Home exercise program  Method of Education: [x] Verbal  [x] Demo  [x] Written  Comprehension of Education:  [x] Verbalizes understanding. [x] Demonstrates understanding. [] Needs Review. [] Demonstrates / verbalizes understanding of HEP / Casandra Stair previously given. Frequency: 1-2 days per week for 4-6 weeks    Patient understands diagnosis/prognosis and consents to treatment, plan and goals: [x] Yes    [] No     Thank you for the opportunity to work with your patient. If you have questions or comments, please contact me at [phone]; fax: [phone]. Electronically signed by: Otis Garcia PT         Please sign Physician's Certification and return to: [address] [place] [address] PHYSICAL THERAPY  [address] [place] [address]  Dept: [phone]  Dept Fax: [phone]  Loc: [phone] Certification / Comments     Frequency/Duration 1-2 days per week for 4-6 weeks. Certification period from 4/19/2022  to 7/14/2022. I have reviewed the Plan of Care established for skilled therapy services and certify that the services are required and that they will be provided while the patient is under my care.     Physician's Comments/Revisions:               Physician's Printed Name:                                           [id number]

## 2022-04-20 PROBLEM — M17.12 PRIMARY OSTEOARTHRITIS OF LEFT KNEE: Status: ACTIVE | Noted: 2022-04-20

## 2022-04-25 ENCOUNTER — TREATMENT (OUTPATIENT)
Dept: PHYSICAL THERAPY | Age: 32
End: 2022-04-25
Payer: MEDICARE

## 2022-04-25 DIAGNOSIS — M17.12 PRIMARY OSTEOARTHRITIS OF LEFT KNEE: Primary | ICD-10-CM

## 2022-04-25 PROCEDURE — 97110 THERAPEUTIC EXERCISES: CPT

## 2022-04-25 NOTE — PROGRESS NOTES
Physical Therapy Daily Treatment Note    Date: 2022  Patient Name: Jorge Luis Levy  : 1990   MRN: [id number]  DOInjury: Insidious  DOSx: none    Referring Provider:   Sanya Monroy DO  [address]  [facility]        Medical Diagnosis:    Diagnosis Orders   1. Primary osteoarthritis of left knee       Outcome Measure:      X = TO BE PERFORMED NEXT VISIT  > = PROGRESS TO THIS    S: Patient reports continued pain. She wakes up at night at times with pain and has difficult time getting in and out of shower. She has many times where she loses balance from knee buckling. O: Ambulated into clinic with knee brace and AFO - patient has club foot  Time  0711-8188      Visit  2/ Repeat outcome measure at mid point and end. Pain    Pain 8/10     ROM  AROM: 110° Flexion,  -10° Extension1  PROM: 118° Flexion, 0° Extension      Modalities          MO   Manual      Stretching knee flexion   MT   Stretching       Patella mobs   TE   Prone hangs   TE   Heel props   TE   Knee flex stretch-seated   TE   Prone self flexion stretch   TE   Exercise       Nustep  L5 10 min  TE   Quad sets 2 reps Patient had spasm in front of knee with second rep where we deferred anymore for today TE   Heel slides 2 x 10   TE   SAQ 3 x 10  TE   LAQ 3 x 10   TE   Marching   TA   Squat    TA   Sit to stands       Step-ups - FWD    TA   Step-ups - LAT   TA   Step-ups - BWD    TA   Step up and over reciprocally    TA   [] TG  [] Leg Press 2-leg   TE   [] TG  [] Leg Press 1-leg   TE   CR    TE   Knee Extension Machine   TE   Marching gait   NR   Side stepping   NR               A: Tolerated well with rest breaks needed throughout. Discussed anatomy, physiology, body mechanics, principles of loading, and progressive loading/activity. Reviewed home exercise program extensively along with instructions for ice and elevation; written updated copy provided.    P: Continue with rehab plan  Will Pastures, PTA    Treatment Charges: Mins Units   Initial Evaluation     Re-Evaluation     Ther Exercise         TE 40 3   Manual Therapy     MT     Ther Activities        TA     Gait Training          GT     Neuro Re-education NR     Modalities     Non-Billable Service Time     Other     Total Time/Units 40 3

## 2022-04-27 ENCOUNTER — TELEPHONE (OUTPATIENT)
Dept: PHYSICAL THERAPY | Age: 32
End: 2022-04-27

## 2022-05-04 ENCOUNTER — PROCEDURE VISIT (OUTPATIENT)
Dept: PODIATRY | Age: 32
End: 2022-05-04
Payer: MEDICARE

## 2022-05-04 VITALS — BODY MASS INDEX: 41.65 KG/M2 | WEIGHT: 250 LBS | HEIGHT: 65 IN

## 2022-05-04 DIAGNOSIS — M79.674 PAIN OF TOE OF RIGHT FOOT: ICD-10-CM

## 2022-05-04 DIAGNOSIS — R26.2 DIFFICULTY WALKING: ICD-10-CM

## 2022-05-04 DIAGNOSIS — M79.675 PAIN OF TOE OF LEFT FOOT: ICD-10-CM

## 2022-05-04 DIAGNOSIS — L85.3 XEROSIS CUTIS: ICD-10-CM

## 2022-05-04 DIAGNOSIS — I87.2 VENOUS INSUFFICIENCY (CHRONIC) (PERIPHERAL): ICD-10-CM

## 2022-05-04 DIAGNOSIS — L60.0 OC (ONYCHOCRYPTOSIS): Primary | ICD-10-CM

## 2022-05-04 PROCEDURE — 99213 OFFICE O/P EST LOW 20 MIN: CPT | Performed by: PODIATRIST

## 2022-05-04 RX ORDER — AMMONIUM LACTATE 12 G/100G
LOTION TOPICAL
Qty: 222 ML | Refills: 5 | Status: SHIPPED | OUTPATIENT
Start: 2022-05-04

## 2022-05-04 NOTE — PROGRESS NOTES
Patient in today for nail care. Patient does not have any complaints of pain at this time.  Patient's PCP is Samm Rodriguez DO date of last ov 2/14/22          Alexis Aldana, TRACYN

## 2022-05-04 NOTE — PROGRESS NOTES
22     Sita Lawrence    : 1990   Sex: female    Age: 29 y.o. Patient's PCP/Provider is:  Reinaldo Ventura DO    Subjective:  Patient is seen today for follow-up regarding continued evaluation regarding ingrown nail issues multiple digital nails bilateral foot. Patient also having some issues with chronic dryness to both lower extremities. Patient is tolerating her current bracing right lower extremity. No other additional abnormalities noted. Chief Complaint   Patient presents with    Nail Problem     nail care       ROS:  Const: Positives and pertinent negatives as per HPI. Musculo: Denies symptoms other than stated above. Neuro: Denies symptoms other than stated above. Skin: Denies symptoms other than stated above.     Current Medications:    Current Outpatient Medications:     levETIRAcetam (KEPPRA) 750 MG tablet, Take 1 tablet by mouth 2 times daily, Disp: 60 tablet, Rfl: 11    sertraline (ZOLOFT) 100 MG tablet, TAKE 1 AND 1/2 TABLETS BY MOUTH DAILY, Disp: 45 tablet, Rfl: 2    famotidine (PEPCID) 20 MG tablet, TAKE ONE TABLET BY MOUTH TWO TIMES A DAY, Disp: 60 tablet, Rfl: 2    NATURAL VITAMIN D-3 125 MCG (5000 UT) TABS tablet, TAKE ONE TABLET BY MOUTH DAILY, Disp: 30 tablet, Rfl: 0    ibuprofen (ADVIL;MOTRIN) 800 MG tablet, TAKE ONE TABLET BY MOUTH EVERY 8 HOURS AS NEEDED FOR PAIN., Disp: 90 tablet, Rfl: 0    calcium carbonate (CALCIUM 600) 600 MG TABS tablet, Take 2 tablet three times a day, Disp: 180 tablet, Rfl: 3    albuterol sulfate  (90 Base) MCG/ACT inhaler, Inhale 2 puffs into the lungs every 6 hours as needed for Wheezing or Shortness of Breath, Disp: 1 Inhaler, Rfl: 2    calcitRIOL (ROCALTROL) 0.5 MCG capsule, Take 1 capsule by mouth 2 times daily, Disp: 180 capsule, Rfl: 3    ondansetron (ZOFRAN-ODT) 4 MG disintegrating tablet, Take 1 tablet by mouth every 8 hours as needed for Nausea or Vomiting, Disp: 20 tablet, Rfl: 1    cetirizine (ZYRTEC) 10 MG tablet, TAKE ONE TABLET BY MOUTH DAILY, Disp: 30 tablet, Rfl: 3    Guselkumab (TREMFYA SC), Inject into the skin, Disp: , Rfl:     clobetasol (TEMOVATE) 0.05 % cream, Apply topically 2 times daily Apply topically 2 times daily. , Disp: 30 g, Rfl: 5    Ciclopirox 1 % SHAM, USE EVERY OTHER DAY AS A SHAMPOO, Disp: 120 mL, Rfl: 5    Clobetasol Propionate Emulsion 0.05 % FOAM, APPLY TO RASH ON SCALP TWICE A DAY AS NEEDED, Disp: , Rfl: 5    Allergies: Allergies   Allergen Reactions    Aspirin Other (See Comments)       Vitals:    05/04/22 1511   Weight: 250 lb (113.4 kg)   Height: 5' 5\" (1.651 m)       Exam:  Neurovascular status unchanged. Ingrown nail issues noted multiple digital nails bilateral foot. No signs of infection noted digital nail regions bilateral foot. No maceration of the webspaces noted bilateral foot. Edematous issues noted to both lower extremities. Dry, xerotic skin changes noted into the digital regions and plantar ball the foot areas. No lower extremity ulcerations noted bilaterally. Gait abnormalities noted bilaterally. Diagnostic Studies:     No results found. Procedures:    None    Plan Per Assessment  Peng was seen today for nail problem. Diagnoses and all orders for this visit:    OC (onychocryptosis)    Pain of toe of right foot    Pain of toe of left foot    Venous insufficiency (chronic) (peripheral)    Xerosis cutis  -     ammonium lactate (LAC-HYDRIN) 12 % lotion; Apply topically daily. Difficulty walking      1. Evaluation and hand management  2. Debridement ingrown nails was performed to patient tolerance. No nail matrix procedures performed on today's visit. 3. Prescription for topical Lac-Hydrin lotion was given for patient to utilize as instructed. 4. Patient will be followed up at a later date for continued evaluation and management.       Seen By:    Arielle Woodruff DPM    Electronically signed by Arielle Woodruff DPM on 5/4/2022 at 3:32 PM    This note was created using voice recognition software. The note was reviewed however may contain grammatical errors.

## 2022-05-05 ENCOUNTER — TELEPHONE (OUTPATIENT)
Dept: SLEEP CENTER | Age: 32
End: 2022-05-05

## 2022-05-06 ENCOUNTER — TREATMENT (OUTPATIENT)
Dept: PHYSICAL THERAPY | Age: 32
End: 2022-05-06
Payer: MEDICARE

## 2022-05-06 DIAGNOSIS — M17.12 PRIMARY OSTEOARTHRITIS OF LEFT KNEE: Primary | ICD-10-CM

## 2022-05-06 PROCEDURE — 97110 THERAPEUTIC EXERCISES: CPT

## 2022-05-06 NOTE — PROGRESS NOTES
Physical Therapy Daily Treatment Note    Date: 2022  Patient Name: Americo Mcdowell  : 1990   MRN: [id number]  DOInjury: Insidious  DOSx: none    Referring Provider:   [facility]  [address] [facility]        Medical Diagnosis:    Diagnosis Orders   1. Primary osteoarthritis of left knee       Outcome Measure:      X = TO BE PERFORMED NEXT VISIT  > = PROGRESS TO THIS    S: Patient reports continued pain, having difficulty getting out of car and standing for a long time. O: Ambulated into clinic wearing AFO - patient has club foot  Time  3366-7940      Visit  3/ Repeat outcome measure at mid point and end. Pain    Pain 5/10     ROM  AROM: 110° Flexion,  -10° Extension1  PROM: 118° Flexion, 0° Extension      Modalities          MO   Manual      Stretching knee flexion   MT   Stretching       Patella mobs   TE   Prone hangs   TE   Heel props   TE   Knee flex stretch-seated   TE   Prone self flexion stretch   TE   Exercise       Nustep  L5 10 min  TE   Quad sets   TE   Heel slides 2 x 10   TE   SAQ 3 x 10  TE   LAQ 2# 3 x 10   TE   Marching 2 x 10 New TA   Hip abduction 2 x 10  \"    Squat    TA   Sit to stands       Step-ups - FWD    TA   Step-ups - LAT   TA   Step-ups - BWD    TA   Step up and over reciprocally    TA   [] TG  [] Leg Press 2-leg   TE   [] TG  [] Leg Press 1-leg   TE   CR    TE   Knee Extension Machine   TE   Marching gait   NR   Side stepping   NR               A: Tolerated well with rest breaks needed throughout. Discussed anatomy, physiology, body mechanics, principles of loading, and progressive loading/activity. Reviewed home exercise program extensively along with instructions for ice and elevation; written updated copy provided.    P: Continue with rehab plan  Milli Patel PTA    Treatment Charges: Mins Units   Initial Evaluation     Re-Evaluation     Ther Exercise         TE 40 3   Manual Therapy     MT     Ther Activities        TA     Gait Training          GT     Neuro Re-education NR     Modalities     Non-Billable Service Time     Other     Total Time/Units 40 3

## 2022-05-13 ENCOUNTER — OFFICE VISIT (OUTPATIENT)
Dept: FAMILY MEDICINE CLINIC | Age: 32
End: 2022-05-13
Payer: MEDICARE

## 2022-05-13 VITALS
HEIGHT: 65 IN | DIASTOLIC BLOOD PRESSURE: 76 MMHG | TEMPERATURE: 96.9 F | BODY MASS INDEX: 43.82 KG/M2 | SYSTOLIC BLOOD PRESSURE: 118 MMHG | RESPIRATION RATE: 16 BRPM | OXYGEN SATURATION: 99 % | WEIGHT: 263 LBS | HEART RATE: 104 BPM

## 2022-05-13 DIAGNOSIS — R19.7 DIARRHEA, UNSPECIFIED TYPE: ICD-10-CM

## 2022-05-13 DIAGNOSIS — K21.9 GASTROESOPHAGEAL REFLUX DISEASE WITHOUT ESOPHAGITIS: ICD-10-CM

## 2022-05-13 DIAGNOSIS — E55.9 VITAMIN D DEFICIENCY: ICD-10-CM

## 2022-05-13 DIAGNOSIS — N64.4 BREAST PAIN, LEFT: ICD-10-CM

## 2022-05-13 DIAGNOSIS — Z00.00 ROUTINE GENERAL MEDICAL EXAMINATION AT A HEALTH CARE FACILITY: Primary | ICD-10-CM

## 2022-05-13 DIAGNOSIS — F41.9 ANXIETY AND DEPRESSION: ICD-10-CM

## 2022-05-13 DIAGNOSIS — F32.A ANXIETY AND DEPRESSION: ICD-10-CM

## 2022-05-13 PROCEDURE — 99395 PREV VISIT EST AGE 18-39: CPT | Performed by: NURSE PRACTITIONER

## 2022-05-13 RX ORDER — CHOLECALCIFEROL TAB 125 MCG (5000 UNIT) 125 MCG (5000 UT)
TAB
Qty: 30 TABLET | Refills: 3 | Status: SHIPPED
Start: 2022-05-13 | End: 2022-09-19

## 2022-05-13 RX ORDER — LORATADINE 10 MG
TABLET ORAL
Qty: 100 TABLET | Refills: 1 | Status: SHIPPED | OUTPATIENT
Start: 2022-05-13

## 2022-05-13 RX ORDER — MULTIVIT-MIN/IRON FUM/FOLIC AC 7.5 MG-4
1 TABLET ORAL DAILY
Qty: 30 TABLET | Refills: 3 | Status: SHIPPED | OUTPATIENT
Start: 2022-05-13

## 2022-05-13 ASSESSMENT — ENCOUNTER SYMPTOMS
DIARRHEA: 1
CONSTIPATION: 0
SHORTNESS OF BREATH: 1
VOMITING: 0
WHEEZING: 0
COUGH: 0
NAUSEA: 0
BLOOD IN STOOL: 1

## 2022-05-13 NOTE — PROGRESS NOTES
JoséM iguel Howe (:  1990) is a 29 y.o. female,Established patient, here for evaluation of the following chief complaint(s):  Diarrhea (sometimes blood in stool as well. Mom said she did have Covid two weeks ago), Breast Pain (previous US order , needs ordered again. ), and Diabetes (1 day too early for a1c)         ASSESSMENT/PLAN:  1. Routine general medical examination at a health care facility  2. Vitamin D deficiency  -     vitamin D3 (NATURAL VITAMIN D-3) 125 MCG (5000 UT) TABS tablet; TAKE ONE TABLET BY MOUTH DAILY, Disp-30 tablet, R-3Normal  The current medical regimen is effective;  continue present plan and medications. 3. Breast pain, left  -     US BREAST LIMITED LEFT; Future    4. Diarrhea, unspecified type  -     Fiber Select Gummies CHEW; Take 2 gummies daily as directed, Disp-100 tablet, R-1Normal  If diarrhea or bright red bleeding persist call office and will be referred to GI  Limit caffeine, increase fiber-bleeding likely hemorrhoids    5. Gastroesophageal reflux disease without esophagitis  Discussed need to limit pop, healthy eating, no lying flat after eating    6. Anxiety and depression  Stable per mom  Has not seen mental health provider recently, encouraged mom to schedule appt    Return in about 6 months (around 2022), or if symptoms worsen or fail to improve. Subjective   SUBJECTIVE/OBJECTIVE:  Complains of intermittent diarrhea with bright red blood in stools at times. Intermittent over past week. Chronically has loose stools with incontinence and intermittent blood  Patient had a positive home Covid test a few weeks ago  Complains of left breast pain. She was ordered an ultrasound previously but it had  before completed  Patient would like to do horseback riding but needs PE and clearance  Complains of night terrors and SOB at night.   She missed her sleep study and next appt is in Sept.   Needs physical for shiffrin day program      Review of Systems   Constitutional: Positive for activity change (in PT for 2 weeks) and fatigue. Negative for appetite change and unexpected weight change. Respiratory: Positive for shortness of breath (at night). Negative for cough and wheezing. Cardiovascular: Negative for chest pain and palpitations. Gastrointestinal: Positive for blood in stool and diarrhea. Negative for constipation, nausea and vomiting. Burning to chest after eating   Musculoskeletal: Positive for arthralgias (left knee) and myalgias. Neurological: Positive for light-headedness and headaches. Negative for weakness. Psychiatric/Behavioral: Positive for dysphoric mood and sleep disturbance. The patient is nervous/anxious. Objective   /76   Pulse 104   Temp 96.9 °F (36.1 °C) (Temporal)   Resp 16   Ht 5' 5\" (1.651 m)   Wt 263 lb (119.3 kg)   SpO2 99%   BMI 43.77 kg/m²    Physical Exam  Constitutional:       General: She is not in acute distress. Appearance: Normal appearance. She is well-developed. HENT:      Head: Normocephalic and atraumatic. Right Ear: Tympanic membrane, ear canal and external ear normal. There is no impacted cerumen. Left Ear: Tympanic membrane, ear canal and external ear normal. There is no impacted cerumen. Nose: Congestion present. Mouth/Throat:      Comments: Tongue is large and thick, visualization of throat and mouth limited  Eyes:      Conjunctiva/sclera: Conjunctivae normal.      Pupils: Pupils are equal, round, and reactive to light. Neck:      Thyroid: No thyromegaly. Trachea: No tracheal deviation. Cardiovascular:      Rate and Rhythm: Normal rate and regular rhythm. Heart sounds: Normal heart sounds. No murmur heard. Pulmonary:      Effort: Pulmonary effort is normal. No respiratory distress. Breath sounds: Normal breath sounds. No wheezing, rhonchi or rales. Chest:      Chest wall: No tenderness.    Abdominal:      General: Bowel sounds are normal.      Palpations: Abdomen is soft. Tenderness: There is no abdominal tenderness. There is no right CVA tenderness or left CVA tenderness. Musculoskeletal:         General: Tenderness (left knee) present. Cervical back: Normal range of motion. Lymphadenopathy:      Cervical: No cervical adenopathy. Skin:     General: Skin is warm and dry. Capillary Refill: Capillary refill takes less than 2 seconds. Findings: Rash (psoriasis along hairline) present. Neurological:      Mental Status: She is alert and oriented to person, place, and time. Psychiatric:         Mood and Affect: Mood normal.         Behavior: Behavior normal.            [facility] moderate      An electronic signature was used to authenticate this note.     --Nia Devlin, TWIN - CNP

## 2022-05-31 ENCOUNTER — TELEPHONE (OUTPATIENT)
Dept: FAMILY MEDICINE CLINIC | Age: 32
End: 2022-05-31

## 2022-07-14 ENCOUNTER — OFFICE VISIT (OUTPATIENT)
Dept: FAMILY MEDICINE CLINIC | Age: 32
End: 2022-07-14
Payer: MEDICARE

## 2022-07-14 VITALS
RESPIRATION RATE: 20 BRPM | HEART RATE: 86 BPM | SYSTOLIC BLOOD PRESSURE: 122 MMHG | HEIGHT: 65 IN | DIASTOLIC BLOOD PRESSURE: 74 MMHG | TEMPERATURE: 97 F | BODY MASS INDEX: 43.82 KG/M2 | OXYGEN SATURATION: 99 % | WEIGHT: 263 LBS

## 2022-07-14 DIAGNOSIS — Z13.31 POSITIVE DEPRESSION SCREENING: ICD-10-CM

## 2022-07-14 DIAGNOSIS — M17.12 PRIMARY OSTEOARTHRITIS OF LEFT KNEE: Primary | ICD-10-CM

## 2022-07-14 DIAGNOSIS — L40.9 PSORIASIS: ICD-10-CM

## 2022-07-14 PROCEDURE — 99213 OFFICE O/P EST LOW 20 MIN: CPT | Performed by: NURSE PRACTITIONER

## 2022-07-14 RX ORDER — CLOBETASOL PROPIONATE 0.5 MG/G
CREAM TOPICAL 2 TIMES DAILY
Qty: 30 G | Refills: 5 | Status: SHIPPED | OUTPATIENT
Start: 2022-07-14

## 2022-07-14 RX ORDER — CICLOPIROX 1 G/100ML
SHAMPOO TOPICAL
Qty: 120 ML | Refills: 5 | Status: SHIPPED | OUTPATIENT
Start: 2022-07-14

## 2022-07-14 RX ORDER — CLOBETASOL PROPIONATE 0.5 MG/G
1 AEROSOL, FOAM TOPICAL DAILY PRN
Qty: 1 EACH | Refills: 2 | Status: SHIPPED | OUTPATIENT
Start: 2022-07-14

## 2022-07-14 RX ORDER — IBUPROFEN 800 MG/1
TABLET ORAL
Qty: 90 TABLET | Refills: 0 | Status: SHIPPED | OUTPATIENT
Start: 2022-07-14

## 2022-07-14 ASSESSMENT — PATIENT HEALTH QUESTIONNAIRE - PHQ9
10. IF YOU CHECKED OFF ANY PROBLEMS, HOW DIFFICULT HAVE THESE PROBLEMS MADE IT FOR YOU TO DO YOUR WORK, TAKE CARE OF THINGS AT HOME, OR GET ALONG WITH OTHER PEOPLE: 1
SUM OF ALL RESPONSES TO PHQ QUESTIONS 1-9: 14
4. FEELING TIRED OR HAVING LITTLE ENERGY: 2
SUM OF ALL RESPONSES TO PHQ QUESTIONS 1-9: 16
3. TROUBLE FALLING OR STAYING ASLEEP: 2
8. MOVING OR SPEAKING SO SLOWLY THAT OTHER PEOPLE COULD HAVE NOTICED. OR THE OPPOSITE, BEING SO FIGETY OR RESTLESS THAT YOU HAVE BEEN MOVING AROUND A LOT MORE THAN USUAL: 2
2. FEELING DOWN, DEPRESSED OR HOPELESS: 2
9. THOUGHTS THAT YOU WOULD BE BETTER OFF DEAD, OR OF HURTING YOURSELF: 2
SUM OF ALL RESPONSES TO PHQ QUESTIONS 1-9: 16
5. POOR APPETITE OR OVEREATING: 2
SUM OF ALL RESPONSES TO PHQ QUESTIONS 1-9: 16
1. LITTLE INTEREST OR PLEASURE IN DOING THINGS: 0
6. FEELING BAD ABOUT YOURSELF - OR THAT YOU ARE A FAILURE OR HAVE LET YOURSELF OR YOUR FAMILY DOWN: 2
SUM OF ALL RESPONSES TO PHQ9 QUESTIONS 1 & 2: 2
7. TROUBLE CONCENTRATING ON THINGS, SUCH AS READING THE NEWSPAPER OR WATCHING TELEVISION: 2

## 2022-07-14 ASSESSMENT — ENCOUNTER SYMPTOMS
NAUSEA: 0
BACK PAIN: 0
SHORTNESS OF BREATH: 1
DIARRHEA: 0
CONSTIPATION: 0
COUGH: 0
WHEEZING: 1
VOMITING: 0

## 2022-07-14 ASSESSMENT — COLUMBIA-SUICIDE SEVERITY RATING SCALE - C-SSRS
2. HAVE YOU ACTUALLY HAD ANY THOUGHTS OF KILLING YOURSELF?: NO
6. HAVE YOU EVER DONE ANYTHING, STARTED TO DO ANYTHING, OR PREPARED TO DO ANYTHING TO END YOUR LIFE?: NO
1. WITHIN THE PAST MONTH, HAVE YOU WISHED YOU WERE DEAD OR WISHED YOU COULD GO TO SLEEP AND NOT WAKE UP?: NO

## 2022-07-14 NOTE — PROGRESS NOTES
Yuridia Breaux (:  1990) is a 29 y.o. female,Established patient, here for evaluation of the following chief complaint(s):  Breast Pain (left ), Insomnia (not sleeping good, having night terrors), and Shaking (all the time)         ASSESSMENT/PLAN:  1. Primary osteoarthritis of left knee  -     ibuprofen (ADVIL;MOTRIN) 800 MG tablet; TAKE ONE TABLET BY MOUTH EVERY 8 HOURS AS NEEDED FOR PAIN., Disp-90 tablet, R-0Normal  - The current medical regimen is effective;  continue present plan and medications. 2. Psoriasis  -     clobetasol (TEMOVATE) 0.05 % cream; Apply topically 2 times daily Apply topically 2 times daily. , Topical, 2 TIMES DAILY Starting Thu 2022, Disp-30 g, R-5, Normal  -     Ciclopirox 1 % SHAM; USE EVERY OTHER DAY AS A SHAMPOO, Disp-120 mL, R-5Normal  -     Clobetasol Propionate Emulsion 0.05 % FOAM; Apply 1 applicator topically daily as needed (psoriasis), Disp-1 each, R-2Normal  - The current medical regimen is effective;  continue present plan and medications. 3. Positive depression screening  -  Starting a new day program soon and feels like it will help with her missing her grandmother  -  Mom reports that she is able to redirect her when she gets sad    -  Will print out most recent OV for physical and note for permission to use albuterol inhaler as needed for new facility      Return in about 3 months (around 10/14/2022), or if symptoms worsen or fail to improve, for med review. Subjective   SUBJECTIVE/OBJECTIVE:  HPI  Here today because she will starting a new day program at a new place. She will need her last physical printed out for her new place. She does need her inhaler for CHINO. She will need a letter in order to use it. Complains of pain to left breast. Breast US ordered in May, mother will take her to get it soon when in [place] for podiatry. Sleep study scheduled soon. Patient reports trouble sleeping and night avendaño. Mother reports that she does snore. Feeling depressed and missing her grandmother. /74   Pulse 86   Temp 97 °F (36.1 °C)   Resp 20   Ht 5' 5\" (1.651 m)   Wt 263 lb (119.3 kg)   LMP 06/01/2022 (Approximate)   SpO2 99%   BMI 43.77 kg/m²     Review of Systems   Constitutional: Negative for activity change, appetite change, chills, diaphoresis, fever and unexpected weight change. Respiratory: Positive for shortness of breath and wheezing. Negative for cough. CHINO, Chronic, uses inhaler   Cardiovascular: Negative for chest pain and palpitations. Gastrointestinal: Negative for constipation, diarrhea, nausea and vomiting. Genitourinary: Negative for difficulty urinating. Musculoskeletal: Positive for arthralgias (left knee, left foot, wearing brace to left foot). Negative for back pain and neck pain. Neurological: Negative for dizziness, weakness and headaches. Psychiatric/Behavioral: Positive for dysphoric mood and sleep disturbance. Negative for self-injury and suicidal ideas. The patient is nervous/anxious. Mom redirects           Objective   Physical Exam  Constitutional:       Appearance: She is well-developed. She is obese. HENT:      Head: Normocephalic and atraumatic. Neck:      Trachea: No tracheal deviation. Cardiovascular:      Rate and Rhythm: Normal rate and regular rhythm. Heart sounds: No murmur heard. Pulmonary:      Effort: Pulmonary effort is normal. No respiratory distress. Breath sounds: Normal breath sounds. Abdominal:      General: Bowel sounds are normal.      Palpations: Abdomen is soft. Tenderness: There is no abdominal tenderness. Musculoskeletal:         General: Tenderness (generalized to left knee) present. Normal range of motion. Comments: Brace to left ankle   Skin:     General: Skin is warm and dry. Neurological:      Mental Status: She is alert and oriented to person, place, and time.    Psychiatric:         Behavior: Behavior normal. On this date 7/14/2022 I have spent 20 minutes reviewing previous notes, test results and face to face with the patient discussing the diagnosis and importance of compliance with the treatment plan as well as documenting on the day of the visit. An electronic signature was used to authenticate this note.     --TWIN Llanes - CNP

## 2022-07-21 ENCOUNTER — OFFICE VISIT (OUTPATIENT)
Dept: PODIATRY | Age: 32
End: 2022-07-21
Payer: MEDICARE

## 2022-07-21 VITALS — HEIGHT: 65 IN | BODY MASS INDEX: 43.32 KG/M2 | WEIGHT: 260 LBS

## 2022-07-21 DIAGNOSIS — M79.675 PAIN OF TOE OF LEFT FOOT: ICD-10-CM

## 2022-07-21 DIAGNOSIS — R56.9 SEIZURE (HCC): ICD-10-CM

## 2022-07-21 DIAGNOSIS — D82.1 DIGEORGE SYNDROME (HCC): ICD-10-CM

## 2022-07-21 DIAGNOSIS — I87.2 VENOUS INSUFFICIENCY (CHRONIC) (PERIPHERAL): ICD-10-CM

## 2022-07-21 DIAGNOSIS — R26.2 DIFFICULTY WALKING: ICD-10-CM

## 2022-07-21 DIAGNOSIS — L60.0 OC (ONYCHOCRYPTOSIS): Primary | ICD-10-CM

## 2022-07-21 DIAGNOSIS — M79.674 PAIN OF TOE OF RIGHT FOOT: ICD-10-CM

## 2022-07-21 PROCEDURE — 99213 OFFICE O/P EST LOW 20 MIN: CPT | Performed by: PODIATRIST

## 2022-07-21 NOTE — PROGRESS NOTES
Patient in today for nail care. Patient does not have any complaints of pain at this time.  Patient's PCP is Norman Peralta,  date of last ov     5/13/22    Dayana Guidry LPN

## 2022-08-19 DIAGNOSIS — F32.A ANXIETY AND DEPRESSION: ICD-10-CM

## 2022-08-19 DIAGNOSIS — F41.9 ANXIETY AND DEPRESSION: ICD-10-CM

## 2022-08-22 RX ORDER — SERTRALINE HYDROCHLORIDE 100 MG/1
TABLET, FILM COATED ORAL
Qty: 45 TABLET | Refills: 0 | Status: SHIPPED
Start: 2022-08-22 | End: 2022-10-03

## 2022-09-18 DIAGNOSIS — E55.9 VITAMIN D DEFICIENCY: ICD-10-CM

## 2022-09-19 RX ORDER — CHOLECALCIFEROL TAB 125 MCG (5000 UNIT) 125 MCG (5000 UT)
TAB
Qty: 30 TABLET | Refills: 0 | Status: SHIPPED | OUTPATIENT
Start: 2022-09-19

## 2022-09-22 ENCOUNTER — OFFICE VISIT (OUTPATIENT)
Dept: NEUROLOGY | Age: 32
End: 2022-09-22
Payer: MEDICARE

## 2022-09-22 VITALS
DIASTOLIC BLOOD PRESSURE: 76 MMHG | WEIGHT: 260 LBS | BODY MASS INDEX: 43.32 KG/M2 | OXYGEN SATURATION: 94 % | TEMPERATURE: 98 F | HEART RATE: 85 BPM | SYSTOLIC BLOOD PRESSURE: 131 MMHG | HEIGHT: 65 IN

## 2022-09-22 DIAGNOSIS — M54.12 CERVICAL RADICULOPATHY: ICD-10-CM

## 2022-09-22 DIAGNOSIS — D82.1 DIGEORGE SYNDROME (HCC): Primary | ICD-10-CM

## 2022-09-22 DIAGNOSIS — G40.409 OTHER GENERALIZED EPILEPSY, NOT INTRACTABLE, WITHOUT STATUS EPILEPTICUS (HCC): ICD-10-CM

## 2022-09-22 PROCEDURE — 99214 OFFICE O/P EST MOD 30 MIN: CPT | Performed by: CLINICAL NURSE SPECIALIST

## 2022-09-22 NOTE — PROGRESS NOTES
Juan Harrell is a 29 y.o. right handed female     This is a 29 y.o. woman who follows for seizures  The pt has a hx of DiGeorge syndrome with associated developmental delay and is with her mother today - who is an excellent historian. She reports seizures since birth - telling me she \"wasn't able to even hold her for hours because she wouldn't stop seizing\". She describes GTC events- her last one being April of 2017. At that time she was started on Keppra 500 mg BID. In 2018, her mother was still reporting \"stiffening spells\"   We increased her Keppra to 750mg BID and these stopped   No continued spells   EEG after increase was unrevealing     She is not sleeping well--- reportedly getting 5-6 hours per night. Sporadic and not restful   Snores frequently and \"makes sounds\"   But readily admits to a few hour nap everyday after work   We referred to sleep medicine and she underwent a study which demonstrated mild sleep apnea-repeat she is utilizing CPAP    She has been noticing increasing tremors as well as hand numbness. She describes neck pain  She also has been less active than previous-watching TV a lot not walking around her room feels more unsteady and started using a cane once again    States that she injured her knee causing copious amounts of knee pain    Also reports tremors -- both hands   Worsens as she gets anxious   Also worsens when utilizing her phone too much    Graduated from [employer] and attended [employer] for 4 years. She now attends [employer]. Medically, she is otherwise stable.     No chest pain or palpitations  No SOB  No vertigo, lightheadedness or loss of consciousness  No falls, tripping or stumbling  No incontinence of bowels or bladder  No itching or bruising appreciated  No numbness, tingling or focal arm/leg weakness    ROS otherwise negative     Allergies as of 09/22/2022 - Fully Reviewed 07/21/2022   Allergen Reaction Noted    Aspirin Other (See Comments) 03/25/2013     Objective:     /76 (Site: Right Upper Arm)   Pulse 85   Temp 98 °F (36.7 °C)   Ht 5' 4.5\" (1.638 m)   Wt 260 lb (117.9 kg)   SpO2 94%   BMI 43.94 kg/m²   Afebrile     General Appearance: alert, cooperative, no distress, appears stated age    Extremities: no cyanosis or edema  Pulses: 1+ and symmetric all extremities -- left afo and knee stabilizing brace  Skin: no rashes or lesions     Bilateral Tinel's sign in wrists    Mental Status: alert; oriented to self and place; pleasant and cooperative    Speech: hypernasal  speech  Language: intact - without aphasias     Cranial Nerves:  I: smell    II: visual acuity     II: visual fields Full   II: pupils JOAN   III,VII: ptosis None   III,IV,VI: extraocular muscles  EOMI without nystagmus    V: mastication Normal   V: facial light touch sensation  Normal   V,VII: corneal reflex  Present   VII: facial muscle function - upper     VII: facial muscle function - lower Normal   VIII: hearing Normal   IX: soft palate elevation  Normal   IX,X: gag reflex    XI: trapezius strength  5/5   XI: sternocleidomastoid strength 5/5   XI: neck extension strength  5/5   XII: tongue strength  Normal     Motor  5/5 throughout  Slightly decreased tone throughout  Overweight bulk    Sensory:  LT  normal  Vibration minimally decreased in ankles     Coordination:   FN, FFM and EUGENIO symmetrical     Gait:  Antalgic    DTR:   BE throughout  No Celis's  No Babinski's  Bilateral grasp reflexes present     Laboratory/Radiology:     CBC with Differential:    Lab Results   Component Value Date/Time    WBC 9.7 06/11/2021 09:30 AM    RBC 4.71 06/11/2021 09:30 AM    HGB 14.3 06/11/2021 09:30 AM    HCT 42.7 06/11/2021 09:30 AM     06/11/2021 09:30 AM    MCV 90.7 06/11/2021 09:30 AM    MCH 30.4 06/11/2021 09:30 AM    MCHC 33.5 06/11/2021 09:30 AM    RDW 13.1 06/11/2021 09:30 AM    LYMPHOPCT 25.4 06/11/2021 09:30 AM    MONOPCT 6.0 06/11/2021 09:30 AM    BASOPCT 0.5 06/11/2021 09:30 AM    MONOSABS 0.58 06/11/2021 09:30 AM    LYMPHSABS 2.46 06/11/2021 09:30 AM    EOSABS 0.30 06/11/2021 09:30 AM    BASOSABS 0.05 06/11/2021 09:30 AM     CMP:    Lab Results   Component Value Date/Time     06/11/2021 09:30 AM    K 4.0 06/11/2021 09:30 AM    CL 99 06/11/2021 09:30 AM    CO2 31 06/11/2021 09:30 AM    BUN 7 06/11/2021 09:30 AM    CREATININE 0.7 06/11/2021 09:30 AM    GFRAA >60 06/11/2021 09:30 AM    LABGLOM >60 06/11/2021 09:30 AM    GLUCOSE 113 06/11/2021 09:30 AM    PROT 7.8 06/11/2021 09:30 AM    LABALBU 4.0 06/11/2021 09:30 AM    CALCIUM 8.7 06/11/2021 09:30 AM    BILITOT 0.5 06/11/2021 09:30 AM    ALKPHOS 47 06/11/2021 09:30 AM    AST 68 06/11/2021 09:30 AM    ALT 68 06/11/2021 09:30 AM     TSH:    Lab Results   Component Value Date/Time    TSH 4.160 06/11/2021 09:30 AM     Keppra level 2    AST/ALT 68    Heads CT 3/2016: No acute intracranial hemorrhage or midline shift. Left frontal scalp hematoma present without calvarial fracture. [s/p fall] Symmetric bilateral basal ganglia calcifications are present. The gray-white matter junctions are intact. No space-occupying intra-axial masses. 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 No opacification of the mastoid sinuses and middle ears.     EEG - June 2018  Normal     Imaging, diagnostic tests and labs (from 2018) personally reviewed at the time of this office visit    Assessment:     hx of DiGeorge syndrome     History of GTC seizures since birth - likely associated with developmental disabilities    Resolved with higher doses of Keppra -- now taking 750mg BID    Developmental disability    Related to DiGeorge syndrome     Gait difficulties from above --minimally exercising and hardly using PT her mom states that she gets 6 sessions a year    Creasing numbness in her hand could be cervical radiculopathy however suspect bilateral carpal tunnel disease    Medically, she is otherwise well.      Plan:     continue Keppra to 750 mg BID    EMG of her arms will be obtained    RTO after testing    TWIN Saenz - CNS, DNP  10:31 AM  9/22/2022

## 2022-09-27 ENCOUNTER — PROCEDURE VISIT (OUTPATIENT)
Dept: PODIATRY | Age: 32
End: 2022-09-27
Payer: MEDICARE

## 2022-09-27 VITALS — HEIGHT: 65 IN | BODY MASS INDEX: 43.32 KG/M2 | WEIGHT: 260 LBS

## 2022-09-27 DIAGNOSIS — D82.1 DIGEORGE SYNDROME (HCC): ICD-10-CM

## 2022-09-27 DIAGNOSIS — M79.675 PAIN OF TOE OF LEFT FOOT: ICD-10-CM

## 2022-09-27 DIAGNOSIS — L60.0 OC (ONYCHOCRYPTOSIS): Primary | ICD-10-CM

## 2022-09-27 DIAGNOSIS — M79.674 PAIN OF TOE OF RIGHT FOOT: ICD-10-CM

## 2022-09-27 DIAGNOSIS — I87.2 VENOUS INSUFFICIENCY (CHRONIC) (PERIPHERAL): ICD-10-CM

## 2022-09-27 DIAGNOSIS — R56.9 SEIZURE (HCC): ICD-10-CM

## 2022-09-27 DIAGNOSIS — R26.2 DIFFICULTY WALKING: ICD-10-CM

## 2022-09-27 PROCEDURE — 99213 OFFICE O/P EST LOW 20 MIN: CPT | Performed by: PODIATRIST

## 2022-09-27 NOTE — PROGRESS NOTES
Patient in today for nail care. Patient does have any complaints of pain at this time. Patient says she is having sharp pains daily in her feet.  Patient's PCP is Odalis Rodriguez DO date of last ov 2/14/22        Mak Ballard LPN

## 2022-09-27 NOTE — PROGRESS NOTES
22     Iris Pick    : 1990   Sex: female    Age: 29 y.o. Patient's PCP/Provider is:  Gregory Montgomery DO    Subjective:  Patient is seen today for follow-up regarding continued evaluation regarding ingrown nail issues to both feet. No other abnormalities noted at this time. Chief Complaint   Patient presents with    Nail Problem     Nail care       ROS:  Const: Positives and pertinent negatives as per HPI. Musculo: Denies symptoms other than stated above. Neuro: Denies symptoms other than stated above. Skin: Denies symptoms other than stated above. Current Medications:    Current Outpatient Medications:     NATURAL VITAMIN D-3 125 MCG (5000 UT) TABS tablet, TAKE ONE TABLET BY MOUTH DAILY, Disp: 30 tablet, Rfl: 0    sertraline (ZOLOFT) 100 MG tablet, TAKE 1 & 1/2 TABLETS BY MOUTH DAILY, Disp: 45 tablet, Rfl: 0    ibuprofen (ADVIL;MOTRIN) 800 MG tablet, TAKE ONE TABLET BY MOUTH EVERY 8 HOURS AS NEEDED FOR PAIN., Disp: 90 tablet, Rfl: 0    clobetasol (TEMOVATE) 0.05 % cream, Apply topically 2 times daily Apply topically 2 times daily. , Disp: 30 g, Rfl: 5    Ciclopirox 1 % SHAM, USE EVERY OTHER DAY AS A SHAMPOO, Disp: 120 mL, Rfl: 5    Clobetasol Propionate Emulsion 0.05 % FOAM, Apply 1 applicator topically daily as needed (psoriasis), Disp: 1 each, Rfl: 2    Fiber Select Gummies CHEW, Take 2 gummies daily as directed, Disp: 100 tablet, Rfl: 1    Multiple Vitamins-Minerals (MULTIVITAMIN WITH MINERALS) tablet, Take 1 tablet by mouth daily, Disp: 30 tablet, Rfl: 3    ammonium lactate (LAC-HYDRIN) 12 % lotion, Apply topically daily. , Disp: 222 mL, Rfl: 5    levETIRAcetam (KEPPRA) 750 MG tablet, Take 1 tablet by mouth 2 times daily, Disp: 60 tablet, Rfl: 11    famotidine (PEPCID) 20 MG tablet, TAKE ONE TABLET BY MOUTH TWO TIMES A DAY, Disp: 60 tablet, Rfl: 2    calcium carbonate (CALCIUM 600) 600 MG TABS tablet, Take 2 tablet three times a day, Disp: 180 tablet, Rfl: 3    albuterol sulfate  (90 Base) MCG/ACT inhaler, Inhale 2 puffs into the lungs every 6 hours as needed for Wheezing or Shortness of Breath, Disp: 1 Inhaler, Rfl: 2    calcitRIOL (ROCALTROL) 0.5 MCG capsule, Take 1 capsule by mouth 2 times daily, Disp: 180 capsule, Rfl: 3    ondansetron (ZOFRAN-ODT) 4 MG disintegrating tablet, Take 1 tablet by mouth every 8 hours as needed for Nausea or Vomiting, Disp: 20 tablet, Rfl: 1    cetirizine (ZYRTEC) 10 MG tablet, TAKE ONE TABLET BY MOUTH DAILY, Disp: 30 tablet, Rfl: 3    Guselkumab (TREMFYA SC), Inject into the skin, Disp: , Rfl:     Allergies: Allergies   Allergen Reactions    Aspirin Other (See Comments)       Vitals:    09/27/22 1447   Weight: 260 lb (117.9 kg)   Height: 5' 5\" (1.651 m)       Exam:  Neurovascular status unchanged. Ingrown nail issues noted multiple digital nails bilateral foot. Edematous issues noted into both lower extremities with stasis skin changes present. No plantar calluses, ulcerations, heel fissuring noted bilateral foot. Contraction deformities noted lesser digits bilateral foot. Diagnostic Studies:     No results found. Procedures:    None    Plan Per Assessment  Peng was seen today for nail problem. Diagnoses and all orders for this visit:    OC (onychocryptosis)    Pain of toe of right foot    Pain of toe of left foot    DiGeorge syndrome (HCC)    Seizure ([address]    Venous insufficiency (chronic) (peripheral)    Difficulty walking      Evaluation and management  Debridement ingrown nails performed to patient tolerance. No nail matrix procedures performed on today's visit. Did recommend continued use of her graduated compression garments on a daily basis. Patient will be followed up at a later date for continued evaluation and management. Seen By:    Trevor Pizano DPM    Electronically signed by Trevor Pizano DPM on 9/27/2022 at 3:23 PM    This note was created using voice recognition software.   The note

## 2022-10-02 DIAGNOSIS — F32.A ANXIETY AND DEPRESSION: ICD-10-CM

## 2022-10-02 DIAGNOSIS — K21.9 GASTROESOPHAGEAL REFLUX DISEASE WITHOUT ESOPHAGITIS: ICD-10-CM

## 2022-10-02 DIAGNOSIS — E20.9 HYPOPARATHYROIDISM, UNSPECIFIED HYPOPARATHYROIDISM TYPE (HCC): ICD-10-CM

## 2022-10-02 DIAGNOSIS — E83.51 HYPOCALCEMIA: ICD-10-CM

## 2022-10-02 DIAGNOSIS — F41.9 ANXIETY AND DEPRESSION: ICD-10-CM

## 2022-10-03 RX ORDER — CALCITRIOL 0.5 UG/1
CAPSULE, LIQUID FILLED ORAL
Qty: 180 CAPSULE | Refills: 0 | Status: SHIPPED | OUTPATIENT
Start: 2022-10-03

## 2022-10-03 RX ORDER — SERTRALINE HYDROCHLORIDE 100 MG/1
TABLET, FILM COATED ORAL
Qty: 45 TABLET | Refills: 0 | Status: SHIPPED
Start: 2022-10-03 | End: 2022-11-01

## 2022-10-03 RX ORDER — FAMOTIDINE 20 MG/1
TABLET, FILM COATED ORAL
Qty: 60 TABLET | Refills: 0 | Status: SHIPPED | OUTPATIENT
Start: 2022-10-03

## 2022-10-04 ENCOUNTER — OFFICE VISIT (OUTPATIENT)
Dept: CARDIOLOGY CLINIC | Age: 32
End: 2022-10-04
Payer: MEDICARE

## 2022-10-04 VITALS
OXYGEN SATURATION: 95 % | BODY MASS INDEX: 45.58 KG/M2 | SYSTOLIC BLOOD PRESSURE: 106 MMHG | WEIGHT: 273.6 LBS | DIASTOLIC BLOOD PRESSURE: 68 MMHG | RESPIRATION RATE: 20 BRPM | HEIGHT: 65 IN | HEART RATE: 105 BPM

## 2022-10-04 DIAGNOSIS — Z95.9 CARDIAC DEVICE IN SITU: ICD-10-CM

## 2022-10-04 DIAGNOSIS — I38 HEART VALVE PROBLEM: Primary | ICD-10-CM

## 2022-10-04 DIAGNOSIS — R06.02 SHORTNESS OF BREATH: ICD-10-CM

## 2022-10-04 PROCEDURE — 99214 OFFICE O/P EST MOD 30 MIN: CPT | Performed by: INTERNAL MEDICINE

## 2022-10-04 PROCEDURE — 93000 ELECTROCARDIOGRAM COMPLETE: CPT | Performed by: INTERNAL MEDICINE

## 2022-10-04 NOTE — PROGRESS NOTES
[facility] Cardiology Progress Note  Dr. Kartik Babcock      Referring Physician: Mar Cheatham DO  CHIEF COMPLAINT:   Chief Complaint   Patient presents with    Cardiac Valve Problem     c/o dizziness 'all the time' Legs are swollen all the time- not a new finding. HISTORY OF PRESENT ILLNESS:   Patient is 29year old female with history of DiGeorge syndrome, surgical repair of interrupted aortic arch and closure of ventricular septal defect, here for follow up. Sharp chest pain that comes and goes, lasts for less than a minute, shortness of breath is at baseline, denies any lightheadedness or dizziness no palpitations, no pedal edema, no PND, no orthopnea, no syncope, no presyncopal episodes. Feels the same as she did a year ago  Still very sedentary    Past Medical History:   Diagnosis Date    Development delay     DiGeorge syndrome ([address]     Obesity     Velocardiofacial syndrome          Past Surgical History:   Procedure Laterality Date    CARDIAC SURGERY      CHOLECYSTECTOMY, LAPAROSCOPIC N/A 8/6/2020    CHOLECYSTECTOMY LAPAROSCOPIC WITH INTRAOPERATIVE CHOLANGIOGRAM performed by Shani Gloria MD at [address]         Current Outpatient Medications   Medication Sig Dispense Refill    famotidine (PEPCID) 20 MG tablet TAKE ONE TABLET BY MOUTH TWO TIMES A DAY 60 tablet 0    sertraline (ZOLOFT) 100 MG tablet TAKE 1 & 1/2 TABLETS BY MOUTH DAILY 45 tablet 0    calcitRIOL (ROCALTROL) 0.5 MCG capsule TAKE ONE CAPSULE BY MOUTH TWO TIMES A  capsule 0    NATURAL VITAMIN D-3 125 MCG (5000 UT) TABS tablet TAKE ONE TABLET BY MOUTH DAILY 30 tablet 0    ibuprofen (ADVIL;MOTRIN) 800 MG tablet TAKE ONE TABLET BY MOUTH EVERY 8 HOURS AS NEEDED FOR PAIN. 90 tablet 0    clobetasol (TEMOVATE) 0.05 % cream Apply topically 2 times daily Apply topically 2 times daily.  30 g 5    Ciclopirox 1 % SHAM USE EVERY OTHER DAY AS A SHAMPOO 120 mL 5    Clobetasol Propionate Emulsion 0.05 % FOAM Apply 1 applicator topically daily as needed (psoriasis) 1 each 2    Fiber Select Gummies CHEW Take 2 gummies daily as directed 100 tablet 1    Multiple Vitamins-Minerals (MULTIVITAMIN WITH MINERALS) tablet Take 1 tablet by mouth daily 30 tablet 3    ammonium lactate (LAC-HYDRIN) 12 % lotion Apply topically daily. 222 mL 5    levETIRAcetam (KEPPRA) 750 MG tablet Take 1 tablet by mouth 2 times daily 60 tablet 11    calcium carbonate (CALCIUM 600) 600 MG TABS tablet Take 2 tablet three times a day 180 tablet 3    albuterol sulfate  (90 Base) MCG/ACT inhaler Inhale 2 puffs into the lungs every 6 hours as needed for Wheezing or Shortness of Breath 1 Inhaler 2    ondansetron (ZOFRAN-ODT) 4 MG disintegrating tablet Take 1 tablet by mouth every 8 hours as needed for Nausea or Vomiting 20 tablet 1    cetirizine (ZYRTEC) 10 MG tablet TAKE ONE TABLET BY MOUTH DAILY (Patient taking differently: as needed TAKE ONE TABLET BY MOUTH DAILY) 30 tablet 3    Guselkumab (TREMFYA SC) Inject into the skin every 8 weeks       No current facility-administered medications for this visit.          Allergies as of 10/04/2022 - Fully Reviewed 10/04/2022   Allergen Reaction Noted    Aspirin Other (See Comments) 03/25/2013       Social History     Socioeconomic History    Marital status: Single     Spouse name: Not on file    Number of children: Not on file    Years of education: Not on file    Highest education level: Not on file   Occupational History    Not on file   Tobacco Use    Smoking status: Never    Smokeless tobacco: Never   Vaping Use    Vaping Use: Never used   Substance and Sexual Activity    Alcohol use: No     Comment: at least 3 soda daily    Drug use: No    Sexual activity: Not Currently   Other Topics Concern    Not on file   Social History Narrative    Not on file     Social Determinants of Health     Financial Resource Strain: Not on file   Food Insecurity: Not on file   Transportation Needs: Not on file   Physical Activity: Not on file   Stress: Not on file   Social Connections: Not on file   Intimate Partner Violence: Not on file   Housing Stability: Not on file       Family History   Problem Relation Age of Onset    Diabetes Mother        REVIEW OF SYSTEMS:     CONSTITUTIONAL:  negative for  fevers, chills, sweats, + fatigue  HEENT:  negative for  tinnitus, earaches, nasal congestion and epistaxis  RESPIRATORY:  negative for  dry cough, cough with sputum, wheezing and hemoptysis  GASTROINTESTINAL:  negative for nausea, vomiting, diarrhea, constipation, pruritus and jaundice  HEMATOLOGIC/LYMPHATIC:  negative for easy bruising, bleeding, lymphadenopathy and petechiae  ENDOCRINE:  negative for heat intolerance, cold intolerance, tremor, hair loss and diabetic symptoms including neither polyuria nor polydipsia nor blurred vision  MUSCULOSKELETAL:  negative for  myalgias, arthralgias, joint swelling, stiff joints and decreased range of motion  NEUROLOGICAL:  negative for memory problems, speech problems, visual disturbance, dysphagia, weakness and numbness      PHYSICAL EXAM:   Constitutional:  Awake, alert cooperative, no apparent distress, and appears stated age. HEENT:  Moist and pink mucous membranes, normocephalic, without obvious abnormality, atraumatic, normal ears and nose. Neck:  Supple, symmetrical, trachea midline, no JVD, no adenopathy, thyroid symmetric, not enlarged and no tenderness, good carotid upstroke bilaterally, no carotid bruit, skin normal  Lungs: No increased work of breathing, mildly decreased air exchange with diffuse scattered wheezing  Cardiovascular: Normal apical impulse, regular rate and rhythm, normal S1 and S2, no S3 or S4, 3/6 systolic murmur at the apex, 3/6 systolic ejection murmur at the right upper sternal border, no pedal edema, good carotid upstroke bilaterally, no carotid bruit, no JVD, no abdominal pulsating masses. Abdomen: Soft, nontender, no hepatomegaly, no splenomegaly, bowel sound positive.    CHEST:  Expands symmetrically, nontender to palpation. Musculoskeletal:  No clubbing or cyanosis. No redness, warmth, or swelling of the joints. Neurological: Alert, awake, and oriented X3. /68 (Site: Left Upper Arm, Position: Sitting, Cuff Size: Large Adult)   Pulse (!) 105   Resp 20   Ht 5' 5\" (1.651 m)   Wt 273 lb 9.6 oz (124.1 kg)   SpO2 95%   BMI 45.53 kg/m²     DATA:   I personally reviewed the visit EKG with the following interpretation: Sinus tachycardia, RSR in V1, normal axis    EKG 9/10/2021, sinus rhythm, nonspecific lateral Q waves, nonspecific T wave changes    EKG  7/28/20 Sinus tachycardia nonspecific T wave changes    ECHO:9/1/2016) Mild concentric left ventricular hypertrophy. Ejection fraction is visually estimated at 50%. No regional wall motion abnormalities seen. Normal left ventricular diastolic filling pattern for age. Right ventricle global systolic function is mildly reduced. Mild aortic stenosis is present. Phsiologic and/or trace mitral regurgitation is present. Mild to moderate tricuspid regurgitation.     Cardiology Labs: BMP:    Lab Results   Component Value Date/Time     06/11/2021 09:30 AM    K 4.0 06/11/2021 09:30 AM    CL 99 06/11/2021 09:30 AM    CO2 31 06/11/2021 09:30 AM    BUN 7 06/11/2021 09:30 AM    CREATININE 0.7 06/11/2021 09:30 AM     CMP:    Lab Results   Component Value Date/Time     06/11/2021 09:30 AM    K 4.0 06/11/2021 09:30 AM    CL 99 06/11/2021 09:30 AM    CO2 31 06/11/2021 09:30 AM    BUN 7 06/11/2021 09:30 AM    CREATININE 0.7 06/11/2021 09:30 AM    PROT 7.8 06/11/2021 09:30 AM     CBC:    Lab Results   Component Value Date/Time    WBC 9.7 06/11/2021 09:30 AM    RBC 4.71 06/11/2021 09:30 AM    HGB 14.3 06/11/2021 09:30 AM    HCT 42.7 06/11/2021 09:30 AM    MCV 90.7 06/11/2021 09:30 AM    RDW 13.1 06/11/2021 09:30 AM     06/11/2021 09:30 AM     PT/INR:  No results found for: PTINR  PT/INR Warfarin:  No components found for: PTPATWAR, PTINRWAR  PTT:  No results found for: APTT  PTT Heparin:  No components found for: APTTHEP  Magnesium:    Lab Results   Component Value Date/Time    MG 1.5 11/01/2018 03:40 PM     TSH:    Lab Results   Component Value Date/Time    TSH 4.160 06/11/2021 09:30 AM     TROPONIN:  No components found for: TROP  BNP:  No results found for: BNP  FASTING LIPID PANEL:  No results found for: CHOL, HDL, TRIG  No orders to display     I have personally reviewed the laboratory, cardiac diagnostic and radiographic testing as outlined above:      IMPRESSION:  1. Di Alban's syndrome: Status post surgical repair of interrupted aortic arch and VSD repair, echocardiogram showed mild aortic valve stenosis, intact ventriculoseptal defect repair, mild-to-moderate TR, no significant changes when compared to previous. 2.  Chest pain: Noncardiac, patient was reassured  3. Shortness of breath: Secondary to asthma and deconditioning    4. Aortic valve stenosis: Mild by most recent echo   5. Tricuspid valve regurgitation: Mild to moderate mitral most recent echo   6. Abnormal electrocardiogram EKG: Chronic changes    RECOMMENDATIONS:   1.  Echocardiogram for further evaluation of her valvular heart disease  2. Increase ambulation as tolerated  3. Continue current treatment  4. Follow-up with Dr. Nemesio Gomez as scheduled  5. Follow-up with Dr. Lara Michael in 1 year, sooner if symptomatic for any reason    I have reviewed my findings and recommendations with patient and her caregiver at bedside    Electronically signed by Delia Carmichael MD on 10/4/2022 at 10:48 AM    NOTE: This report was transcribed using voice recognition software.  Every effort was made to ensure accuracy; however, inadvertent computerized transcription errors may be present

## 2022-11-01 DIAGNOSIS — F32.A ANXIETY AND DEPRESSION: ICD-10-CM

## 2022-11-01 DIAGNOSIS — F41.9 ANXIETY AND DEPRESSION: ICD-10-CM

## 2022-11-01 RX ORDER — SERTRALINE HYDROCHLORIDE 100 MG/1
TABLET, FILM COATED ORAL
Qty: 45 TABLET | Refills: 2 | Status: SHIPPED | OUTPATIENT
Start: 2022-11-01

## 2022-11-09 DIAGNOSIS — K21.9 GASTROESOPHAGEAL REFLUX DISEASE WITHOUT ESOPHAGITIS: ICD-10-CM

## 2022-11-09 DIAGNOSIS — M17.12 PRIMARY OSTEOARTHRITIS OF LEFT KNEE: ICD-10-CM

## 2022-11-09 RX ORDER — IBUPROFEN 800 MG/1
TABLET ORAL
Qty: 90 TABLET | Refills: 0 | Status: SHIPPED | OUTPATIENT
Start: 2022-11-09

## 2022-11-09 RX ORDER — FAMOTIDINE 20 MG/1
TABLET, FILM COATED ORAL
Qty: 60 TABLET | Refills: 0 | Status: SHIPPED | OUTPATIENT
Start: 2022-11-09

## 2022-12-05 DIAGNOSIS — E20.9 HYPOPARATHYROIDISM, UNSPECIFIED HYPOPARATHYROIDISM TYPE (HCC): ICD-10-CM

## 2022-12-05 DIAGNOSIS — E83.51 HYPOCALCEMIA: ICD-10-CM

## 2022-12-05 DIAGNOSIS — K21.9 GASTROESOPHAGEAL REFLUX DISEASE WITHOUT ESOPHAGITIS: ICD-10-CM

## 2022-12-05 RX ORDER — FAMOTIDINE 20 MG/1
TABLET, FILM COATED ORAL
Qty: 60 TABLET | Refills: 0 | Status: SHIPPED | OUTPATIENT
Start: 2022-12-05

## 2022-12-21 ENCOUNTER — OFFICE VISIT (OUTPATIENT)
Dept: PODIATRY | Age: 32
End: 2022-12-21
Payer: MEDICARE

## 2022-12-21 VITALS — BODY MASS INDEX: 44.98 KG/M2 | WEIGHT: 270 LBS | HEIGHT: 65 IN

## 2022-12-21 DIAGNOSIS — M79.674 PAIN OF TOE OF RIGHT FOOT: ICD-10-CM

## 2022-12-21 DIAGNOSIS — M79.675 PAIN OF TOE OF LEFT FOOT: ICD-10-CM

## 2022-12-21 DIAGNOSIS — R26.2 DIFFICULTY WALKING: ICD-10-CM

## 2022-12-21 DIAGNOSIS — I87.2 VENOUS INSUFFICIENCY (CHRONIC) (PERIPHERAL): ICD-10-CM

## 2022-12-21 DIAGNOSIS — R56.9 SEIZURE (HCC): ICD-10-CM

## 2022-12-21 DIAGNOSIS — D82.1 DIGEORGE SYNDROME (HCC): ICD-10-CM

## 2022-12-21 DIAGNOSIS — L60.0 OC (ONYCHOCRYPTOSIS): Primary | ICD-10-CM

## 2022-12-21 PROCEDURE — 99213 OFFICE O/P EST LOW 20 MIN: CPT | Performed by: PODIATRIST

## 2022-12-21 NOTE — PROGRESS NOTES
Patient in today for nail care. Patient does not have any complaints of pain at this time.  Patient's PCP is Federico Lam DO date of last ov 7/14/22        Cheyanne Cotter LPN

## 2022-12-21 NOTE — PROGRESS NOTES
22     Janet Winter    : 1990   Sex: female    Age: 29 y.o. Patient's PCP/Provider is:  Yadiel Mahan DO    Subjective:  Patient is seen today for follow-up regarding continued care chronic ingrown nail issues noted to multiple nails bilateral foot. Patient still having issues with certain shoe gear and ambulatory activities. They are going to contact the orthotic facility to get her current AFO brace adjusted. No other additional abnormalities noted. Chief Complaint   Patient presents with    Nail Problem     Nail care       ROS:  Const: Positives and pertinent negatives as per HPI. Musculo: Denies symptoms other than stated above. Neuro: Denies symptoms other than stated above. Skin: Denies symptoms other than stated above. Current Medications:    Current Outpatient Medications:     calcium carbonate 1500 (600 Ca) MG TABS tablet, TAKE TWO TABLETS BY MOUTH THREE TIMES A DAY, Disp: 180 tablet, Rfl: 0    famotidine (PEPCID) 20 MG tablet, TAKE ONE TABLET BY MOUTH TWO TIMES A DAY, Disp: 60 tablet, Rfl: 0    ibuprofen (IBU) 800 MG tablet, TAKE ONE TABLET BY MOUTH EVERY 8 HOURS AS NEEDED FOR PAIN, Disp: 90 tablet, Rfl: 0    sertraline (ZOLOFT) 100 MG tablet, TAKE 1 & 1/2 TABLETS BY MOUTH DAILY, Disp: 45 tablet, Rfl: 2    calcitRIOL (ROCALTROL) 0.5 MCG capsule, TAKE ONE CAPSULE BY MOUTH TWO TIMES A DAY, Disp: 180 capsule, Rfl: 0    NATURAL VITAMIN D-3 125 MCG (5000 UT) TABS tablet, TAKE ONE TABLET BY MOUTH DAILY, Disp: 30 tablet, Rfl: 0    clobetasol (TEMOVATE) 0.05 % cream, Apply topically 2 times daily Apply topically 2 times daily. , Disp: 30 g, Rfl: 5    Ciclopirox 1 % SHAM, USE EVERY OTHER DAY AS A SHAMPOO, Disp: 120 mL, Rfl: 5    Clobetasol Propionate Emulsion 0.05 % FOAM, Apply 1 applicator topically daily as needed (psoriasis), Disp: 1 each, Rfl: 2    Fiber Select Gummies CHEW, Take 2 gummies daily as directed, Disp: 100 tablet, Rfl: 1    Multiple Vitamins-Minerals (MULTIVITAMIN WITH MINERALS) tablet, Take 1 tablet by mouth daily, Disp: 30 tablet, Rfl: 3    ammonium lactate (LAC-HYDRIN) 12 % lotion, Apply topically daily. , Disp: 222 mL, Rfl: 5    levETIRAcetam (KEPPRA) 750 MG tablet, Take 1 tablet by mouth 2 times daily, Disp: 60 tablet, Rfl: 11    albuterol sulfate  (90 Base) MCG/ACT inhaler, Inhale 2 puffs into the lungs every 6 hours as needed for Wheezing or Shortness of Breath, Disp: 1 Inhaler, Rfl: 2    ondansetron (ZOFRAN-ODT) 4 MG disintegrating tablet, Take 1 tablet by mouth every 8 hours as needed for Nausea or Vomiting, Disp: 20 tablet, Rfl: 1    cetirizine (ZYRTEC) 10 MG tablet, TAKE ONE TABLET BY MOUTH DAILY (Patient taking differently: as needed TAKE ONE TABLET BY MOUTH DAILY), Disp: 30 tablet, Rfl: 3    Guselkumab (TREMFYA SC), Inject into the skin every 8 weeks, Disp: , Rfl:     Allergies: Allergies   Allergen Reactions    Aspirin Other (See Comments)       Vitals:    12/21/22 1402   Weight: 270 lb (122.5 kg)   Height: 5' 5\" (1.651 m)       Exam:  Neurovascular status unchanged. Ingrown nail issues noted to multiple digital nails bilateral foot. No signs of infection noted digital regions bilateral foot. No maceration webspaces noted bilateral foot. Plantar calluses or heel fissuring noted bilaterally. Diagnostic Studies:     No results found. Procedures:    None    Plan Per Assessment  Peng was seen today for nail problem. Diagnoses and all orders for this visit:    OC (onychocryptosis)    Pain of toe of right foot    Pain of toe of left foot    DiGeorge syndrome (HCC)    Seizure ([address]    Venous insufficiency (chronic) (peripheral)    Difficulty walking      Evaluation and management  Debridement ingrown nails was performed to patient tolerance. No nail matrix procedures performed on today's visit. We did discuss continued use of her graduated compression garments on a daily basis to prevent any additional issues.   Patient will be followed up at a later date for continued evaluation and care. Seen By:    Norma Valencia DPM    Electronically signed by Norma Valencia DPM on 12/21/2022 at 2:35 PM    This note was created using voice recognition software. The note was reviewed however may contain grammatical errors.

## 2022-12-30 DIAGNOSIS — K21.9 GASTROESOPHAGEAL REFLUX DISEASE WITHOUT ESOPHAGITIS: ICD-10-CM

## 2022-12-30 DIAGNOSIS — E55.9 VITAMIN D DEFICIENCY: ICD-10-CM

## 2022-12-30 RX ORDER — FAMOTIDINE 20 MG/1
TABLET, FILM COATED ORAL
Qty: 60 TABLET | Refills: 0 | Status: SHIPPED | OUTPATIENT
Start: 2022-12-30

## 2022-12-30 RX ORDER — CHOLECALCIFEROL TAB 125 MCG (5000 UNIT) 125 MCG (5000 UT)
TAB
Qty: 30 TABLET | Refills: 0 | Status: SHIPPED | OUTPATIENT
Start: 2022-12-30

## 2022-12-30 NOTE — TELEPHONE ENCOUNTER
Requested Prescriptions     Pending Prescriptions Disp Refills    NATURAL VITAMIN D-3 125 MCG (5000 UT) TABS tablet [Pharmacy Med Name: Natural Vitamin D-3 Oral Tablet 125 MCG (5000 UT)] 30 tablet 0     Sig: TAKE ONE TABLET BY MOUTH DAILY       Next appt is 12/30/2022  Last appt was 7/14/2022

## 2022-12-30 NOTE — TELEPHONE ENCOUNTER
Requested Prescriptions     Pending Prescriptions Disp Refills    famotidine (PEPCID) 20 MG tablet [Pharmacy Med Name: Famotidine Oral Tablet 20 MG] 60 tablet 0     Sig: TAKE ONE TABLET BY MOUTH TWO TIMES A DAY       Next appt is Visit date not found  Last appt was 7/14/2022

## 2023-02-17 DIAGNOSIS — E55.9 VITAMIN D DEFICIENCY: ICD-10-CM

## 2023-02-17 DIAGNOSIS — E83.51 HYPOCALCEMIA: ICD-10-CM

## 2023-02-17 DIAGNOSIS — F41.9 ANXIETY AND DEPRESSION: ICD-10-CM

## 2023-02-17 DIAGNOSIS — D82.1 DIGEORGE SYNDROME (HCC): ICD-10-CM

## 2023-02-17 DIAGNOSIS — E20.9 HYPOPARATHYROIDISM, UNSPECIFIED HYPOPARATHYROIDISM TYPE (HCC): ICD-10-CM

## 2023-02-17 DIAGNOSIS — F32.A ANXIETY AND DEPRESSION: ICD-10-CM

## 2023-02-17 DIAGNOSIS — G40.409 OTHER GENERALIZED EPILEPSY, NOT INTRACTABLE, WITHOUT STATUS EPILEPTICUS (HCC): ICD-10-CM

## 2023-02-17 RX ORDER — SERTRALINE HYDROCHLORIDE 100 MG/1
TABLET, FILM COATED ORAL
Qty: 45 TABLET | Refills: 2 | Status: SHIPPED | OUTPATIENT
Start: 2023-02-17

## 2023-02-17 RX ORDER — CHOLECALCIFEROL TAB 125 MCG (5000 UNIT) 125 MCG (5000 UT)
TAB
Qty: 30 TABLET | Refills: 0 | Status: SHIPPED | OUTPATIENT
Start: 2023-02-17

## 2023-02-17 RX ORDER — LEVETIRACETAM 750 MG/1
750 TABLET ORAL 2 TIMES DAILY
Qty: 60 TABLET | Refills: 11 | Status: SHIPPED | OUTPATIENT
Start: 2023-02-17

## 2023-02-17 NOTE — TELEPHONE ENCOUNTER
Requested Prescriptions     Pending Prescriptions Disp Refills    sertraline (ZOLOFT) 100 MG tablet 45 tablet 2     Sig: Take 1 & 1/2 tablets by mouth daily    levETIRAcetam (KEPPRA) 750 MG tablet 60 tablet 11     Sig: Take 1 tablet by mouth 2 times daily    calcium carbonate 1500 (600 Ca) MG TABS tablet 180 tablet 0     Sig: TAKE TWO TABLETS BY MOUTH THREE TIMES A DAY    vitamin D3 (NATURAL VITAMIN D-3) 125 MCG (5000 UT) TABS tablet 30 tablet 0     Sig: Take one tablet by mouth daily       Next appt is Visit date not found  Last appt was 7/14/2022

## 2023-04-05 ENCOUNTER — OFFICE VISIT (OUTPATIENT)
Dept: FAMILY MEDICINE CLINIC | Age: 33
End: 2023-04-05

## 2023-04-05 VITALS
DIASTOLIC BLOOD PRESSURE: 80 MMHG | OXYGEN SATURATION: 98 % | TEMPERATURE: 97.2 F | SYSTOLIC BLOOD PRESSURE: 124 MMHG | HEIGHT: 65 IN | BODY MASS INDEX: 45.15 KG/M2 | HEART RATE: 74 BPM | WEIGHT: 271 LBS | RESPIRATION RATE: 18 BRPM

## 2023-04-05 DIAGNOSIS — M17.12 PRIMARY OSTEOARTHRITIS OF LEFT KNEE: ICD-10-CM

## 2023-04-05 DIAGNOSIS — E11.9 NEW ONSET TYPE 2 DIABETES MELLITUS (HCC): ICD-10-CM

## 2023-04-05 DIAGNOSIS — L40.9 PSORIASIS: ICD-10-CM

## 2023-04-05 DIAGNOSIS — E83.51 HYPOCALCEMIA: ICD-10-CM

## 2023-04-05 DIAGNOSIS — E20.9 HYPOPARATHYROIDISM, UNSPECIFIED HYPOPARATHYROIDISM TYPE (HCC): ICD-10-CM

## 2023-04-05 DIAGNOSIS — Z00.00 MEDICARE ANNUAL WELLNESS VISIT, SUBSEQUENT: Primary | ICD-10-CM

## 2023-04-05 DIAGNOSIS — E55.9 VITAMIN D DEFICIENCY: ICD-10-CM

## 2023-04-05 DIAGNOSIS — Z00.00 ROUTINE GENERAL MEDICAL EXAMINATION AT A HEALTH CARE FACILITY: ICD-10-CM

## 2023-04-05 DIAGNOSIS — K21.9 GASTROESOPHAGEAL REFLUX DISEASE WITHOUT ESOPHAGITIS: ICD-10-CM

## 2023-04-05 LAB — HBA1C MFR BLD: 7.2 %

## 2023-04-05 RX ORDER — METFORMIN HYDROCHLORIDE 750 MG/1
750 TABLET, EXTENDED RELEASE ORAL
Qty: 30 TABLET | Refills: 3 | Status: SHIPPED | OUTPATIENT
Start: 2023-04-05

## 2023-04-05 RX ORDER — CHOLECALCIFEROL TAB 125 MCG (5000 UNIT) 125 MCG (5000 UT)
TAB
Qty: 30 TABLET | Refills: 0 | Status: SHIPPED | OUTPATIENT
Start: 2023-04-05

## 2023-04-05 RX ORDER — IBUPROFEN 800 MG/1
TABLET ORAL
Qty: 90 TABLET | Refills: 0 | Status: SHIPPED | OUTPATIENT
Start: 2023-04-05

## 2023-04-05 RX ORDER — GUSELKUMAB 100 MG/ML
INJECTION SUBCUTANEOUS
COMMUNITY
Start: 2023-02-20 | End: 2023-04-05 | Stop reason: CLARIF

## 2023-04-05 RX ORDER — CICLOPIROX 1 G/100ML
SHAMPOO TOPICAL
Qty: 120 ML | Refills: 5 | Status: SHIPPED | OUTPATIENT
Start: 2023-04-05

## 2023-04-05 RX ORDER — CALCITRIOL 0.5 UG/1
0.5 CAPSULE, LIQUID FILLED ORAL 2 TIMES DAILY
Qty: 180 CAPSULE | Refills: 0 | Status: SHIPPED | OUTPATIENT
Start: 2023-04-05

## 2023-04-05 RX ORDER — FAMOTIDINE 20 MG/1
20 TABLET, FILM COATED ORAL 2 TIMES DAILY
Qty: 60 TABLET | Refills: 0 | Status: SHIPPED | OUTPATIENT
Start: 2023-04-05

## 2023-04-05 RX ORDER — CLOBETASOL PROPIONATE 0.5 MG/G
CREAM TOPICAL 2 TIMES DAILY
Qty: 30 G | Refills: 5 | Status: SHIPPED | OUTPATIENT
Start: 2023-04-05

## 2023-04-05 RX ORDER — CLOBETASOL PROPIONATE 0.5 MG/G
1 AEROSOL, FOAM TOPICAL DAILY PRN
Qty: 1 EACH | Refills: 2 | Status: SHIPPED | OUTPATIENT
Start: 2023-04-05

## 2023-04-05 SDOH — ECONOMIC STABILITY: INCOME INSECURITY: HOW HARD IS IT FOR YOU TO PAY FOR THE VERY BASICS LIKE FOOD, HOUSING, MEDICAL CARE, AND HEATING?: NOT HARD AT ALL

## 2023-04-05 SDOH — ECONOMIC STABILITY: HOUSING INSECURITY
IN THE LAST 12 MONTHS, WAS THERE A TIME WHEN YOU DID NOT HAVE A STEADY PLACE TO SLEEP OR SLEPT IN A SHELTER (INCLUDING NOW)?: NO

## 2023-04-05 SDOH — ECONOMIC STABILITY: FOOD INSECURITY: WITHIN THE PAST 12 MONTHS, THE FOOD YOU BOUGHT JUST DIDN'T LAST AND YOU DIDN'T HAVE MONEY TO GET MORE.: NEVER TRUE

## 2023-04-05 SDOH — ECONOMIC STABILITY: FOOD INSECURITY: WITHIN THE PAST 12 MONTHS, YOU WORRIED THAT YOUR FOOD WOULD RUN OUT BEFORE YOU GOT MONEY TO BUY MORE.: NEVER TRUE

## 2023-04-05 ASSESSMENT — PATIENT HEALTH QUESTIONNAIRE - PHQ9
SUM OF ALL RESPONSES TO PHQ QUESTIONS 1-9: 0
SUM OF ALL RESPONSES TO PHQ QUESTIONS 1-9: 2
1. LITTLE INTEREST OR PLEASURE IN DOING THINGS: 0
2. FEELING DOWN, DEPRESSED OR HOPELESS: 0
8. MOVING OR SPEAKING SO SLOWLY THAT OTHER PEOPLE COULD HAVE NOTICED. OR THE OPPOSITE, BEING SO FIGETY OR RESTLESS THAT YOU HAVE BEEN MOVING AROUND A LOT MORE THAN USUAL: 0
SUM OF ALL RESPONSES TO PHQ QUESTIONS 1-9: 0
1. LITTLE INTEREST OR PLEASURE IN DOING THINGS: 0
5. POOR APPETITE OR OVEREATING: 0
SUM OF ALL RESPONSES TO PHQ QUESTIONS 1-9: 2
3. TROUBLE FALLING OR STAYING ASLEEP: 2
10. IF YOU CHECKED OFF ANY PROBLEMS, HOW DIFFICULT HAVE THESE PROBLEMS MADE IT FOR YOU TO DO YOUR WORK, TAKE CARE OF THINGS AT HOME, OR GET ALONG WITH OTHER PEOPLE: 0
SUM OF ALL RESPONSES TO PHQ QUESTIONS 1-9: 2
SUM OF ALL RESPONSES TO PHQ9 QUESTIONS 1 & 2: 0
SUM OF ALL RESPONSES TO PHQ QUESTIONS 1-9: 0
6. FEELING BAD ABOUT YOURSELF - OR THAT YOU ARE A FAILURE OR HAVE LET YOURSELF OR YOUR FAMILY DOWN: 0
2. FEELING DOWN, DEPRESSED OR HOPELESS: 0
4. FEELING TIRED OR HAVING LITTLE ENERGY: 0
SUM OF ALL RESPONSES TO PHQ QUESTIONS 1-9: 2
9. THOUGHTS THAT YOU WOULD BE BETTER OFF DEAD, OR OF HURTING YOURSELF: 0
SUM OF ALL RESPONSES TO PHQ9 QUESTIONS 1 & 2: 0
SUM OF ALL RESPONSES TO PHQ QUESTIONS 1-9: 0
7. TROUBLE CONCENTRATING ON THINGS, SUCH AS READING THE NEWSPAPER OR WATCHING TELEVISION: 0

## 2023-04-05 ASSESSMENT — LIFESTYLE VARIABLES
HOW MANY STANDARD DRINKS CONTAINING ALCOHOL DO YOU HAVE ON A TYPICAL DAY: PATIENT DOES NOT DRINK
HOW OFTEN DO YOU HAVE A DRINK CONTAINING ALCOHOL: NEVER

## 2023-04-05 NOTE — PROGRESS NOTES
General HRA Questions:  Select all that apply: (!) New or Increased Pain    Pain Interventions:  Patient comments: headaches, legs        Weight and Activity:  Physical Activity: Insufficiently Active    Days of Exercise per Week: 4 days    Minutes of Exercise per Session: 30 min     On average, how many days per week do you engage in moderate to strenuous exercise (like a brisk walk)?: 4 days  Have you lost any weight without trying in the past 3 months?: No  Body mass index: (!) 45.09  Obesity Interventions:  Diet and exercise discussed             Safety:  Do you have working smoke detectors?: (!) No  Do you always fasten your seatbelt when you are in a car?: (!) No  Interventions:  Has smoke detectors but not installed yet  Wears seatbelt most days, advised need to wear constantly    ADL's:   Patient reports needing help with:  Select all that apply: D[employer]ch[employer], Grooming, Bathing, Toileting, Walking/Balance  Select all that apply: [employer], Housekeeping, Banking/Finances, Shopping, Telephone Use, Food Preparation, Transportation, Taking Medications  Interventions:  Lives with mom and mom helps her with these    Advanced Directives:  Do you have a Living Will?: (!) No    Intervention:  has NO advanced directive - information provided                     Objective   Vitals:    04/05/23 1352   BP: 124/80   Pulse: 74   Resp: 18   Temp: 97.2 °F (36.2 °C)   TempSrc: Temporal   SpO2: 98%   Weight: 271 lb (122.9 kg)   Height: 5' 5\" (1.651 m)      Body mass index is 45.1 kg/m².       General Appearance: alert and oriented to person, place and time, well developed and well- nourished, in no acute distress  Skin: warm and dry, plaques to hairline  Head: normocephalic and atraumatic  Eyes: pupils equal, round, and reactive to light, extraocular eye movements intact, conjunctivae normal  ENT: tympanic membrane, external ear and ear canal normal bilaterally, nose without deformity, nasal mucosa and turbinates normal

## 2023-04-19 ENCOUNTER — PROCEDURE VISIT (OUTPATIENT)
Dept: PODIATRY | Age: 33
End: 2023-04-19
Payer: MEDICARE

## 2023-04-19 VITALS — BODY MASS INDEX: 45.15 KG/M2 | WEIGHT: 271 LBS | HEIGHT: 65 IN

## 2023-04-19 DIAGNOSIS — M79.674 PAIN OF TOE OF RIGHT FOOT: ICD-10-CM

## 2023-04-19 DIAGNOSIS — D82.1 DIGEORGE SYNDROME (HCC): ICD-10-CM

## 2023-04-19 DIAGNOSIS — L60.0 OC (ONYCHOCRYPTOSIS): Primary | ICD-10-CM

## 2023-04-19 DIAGNOSIS — M25.372 INSTABILITY OF LEFT ANKLE JOINT: ICD-10-CM

## 2023-04-19 DIAGNOSIS — Q66.89 CLUBFOOT, CONGENITAL: ICD-10-CM

## 2023-04-19 DIAGNOSIS — M79.675 PAIN OF TOE OF LEFT FOOT: ICD-10-CM

## 2023-04-19 DIAGNOSIS — R26.2 DIFFICULTY WALKING: ICD-10-CM

## 2023-04-19 PROCEDURE — 99213 OFFICE O/P EST LOW 20 MIN: CPT | Performed by: PODIATRIST

## 2023-04-19 NOTE — PROGRESS NOTES
Patient in today for nail care. Patient does not have any complaints of pain at this time.  Patient's PCP is Saúl Juarez DO date of last ov 4/5/23          Quiana Beltran LPN

## 2023-05-03 ENCOUNTER — HOSPITAL ENCOUNTER (OUTPATIENT)
Age: 33
Discharge: HOME | End: 2023-05-03
Payer: MEDICARE

## 2023-05-03 DIAGNOSIS — Z00.00 ROUTINE GENERAL MEDICAL EXAMINATION AT A HEALTH CARE FACILITY: ICD-10-CM

## 2023-05-03 DIAGNOSIS — E83.51 HYPOCALCEMIA: ICD-10-CM

## 2023-05-03 DIAGNOSIS — E55.9 VITAMIN D DEFICIENCY: ICD-10-CM

## 2023-05-03 DIAGNOSIS — E11.9 NEW ONSET TYPE 2 DIABETES MELLITUS (HCC): ICD-10-CM

## 2023-05-03 LAB
ALBUMIN SERPL-MCNC: 3.7 G/DL (ref 3.5–5.2)
ALP SERPL-CCNC: 50 U/L (ref 35–104)
ALT SERPL-CCNC: 55 U/L (ref 0–32)
ANION GAP SERPL CALCULATED.3IONS-SCNC: 10 MMOL/L (ref 7–16)
AST SERPL-CCNC: 61 U/L (ref 0–31)
BASOPHILS # BLD: 0.05 E9/L (ref 0–0.2)
BASOPHILS NFR BLD: 0.5 % (ref 0–2)
BILIRUB SERPL-MCNC: 0.5 MG/DL (ref 0–1.2)
BUN SERPL-MCNC: 11 MG/DL (ref 6–20)
CALCIUM SERPL-MCNC: 9.2 MG/DL (ref 8.6–10.2)
CHLORIDE SERPL-SCNC: 98 MMOL/L (ref 98–107)
CHOLESTEROL, TOTAL: 180 MG/DL (ref 0–199)
CO2 SERPL-SCNC: 30 MMOL/L (ref 22–29)
CREAT SERPL-MCNC: 0.6 MG/DL (ref 0.5–1)
EOSINOPHIL # BLD: 0.28 E9/L (ref 0.05–0.5)
EOSINOPHIL NFR BLD: 2.6 % (ref 0–6)
ERYTHROCYTE [DISTWIDTH] IN BLOOD BY AUTOMATED COUNT: 14 FL (ref 11.5–15)
GLUCOSE SERPL-MCNC: 112 MG/DL (ref 74–99)
HCT VFR BLD AUTO: 39.8 % (ref 34–48)
HDLC SERPL-MCNC: 32 MG/DL
HGB BLD-MCNC: 13 G/DL (ref 11.5–15.5)
IMM GRANULOCYTES # BLD: 0.06 E9/L
IMM GRANULOCYTES NFR BLD: 0.6 % (ref 0–5)
LDLC SERPL CALC-MCNC: 108 MG/DL (ref 0–99)
LYMPHOCYTES # BLD: 2.57 E9/L (ref 1.5–4)
LYMPHOCYTES NFR BLD: 23.9 % (ref 20–42)
MCH RBC QN AUTO: 29.3 PG (ref 26–35)
MCHC RBC AUTO-ENTMCNC: 32.7 % (ref 32–34.5)
MCV RBC AUTO: 89.8 FL (ref 80–99.9)
MONOCYTES # BLD: 0.59 E9/L (ref 0.1–0.95)
MONOCYTES NFR BLD: 5.5 % (ref 2–12)
NEUTROPHILS # BLD: 7.19 E9/L (ref 1.8–7.3)
NEUTS SEG NFR BLD: 66.9 % (ref 43–80)
PLATELET # BLD AUTO: 133 E9/L (ref 130–450)
PMV BLD AUTO: 12.2 FL (ref 7–12)
POTASSIUM SERPL-SCNC: 4.1 MMOL/L (ref 3.5–5)
PROT SERPL-MCNC: 7.6 G/DL (ref 6.4–8.3)
RBC # BLD AUTO: 4.43 E12/L (ref 3.5–5.5)
SODIUM SERPL-SCNC: 138 MMOL/L (ref 132–146)
TRIGL SERPL-MCNC: 198 MG/DL (ref 0–149)
TSH SERPL-MCNC: 3.87 UIU/ML (ref 0.27–4.2)
VITAMIN D 25-HYDROXY: 41 NG/ML (ref 30–100)
VLDLC SERPL CALC-MCNC: 40 MG/DL
WBC # BLD: 10.7 E9/L (ref 4.5–11.5)

## 2023-05-03 PROCEDURE — 80053 COMPREHEN METABOLIC PANEL: CPT

## 2023-05-03 PROCEDURE — 82306 VITAMIN D 25 HYDROXY: CPT

## 2023-05-03 PROCEDURE — 84443 ASSAY THYROID STIM HORMONE: CPT

## 2023-05-03 PROCEDURE — 85025 COMPLETE CBC W/AUTO DIFF WBC: CPT

## 2023-05-03 PROCEDURE — 36415 COLL VENOUS BLD VENIPUNCTURE: CPT

## 2023-05-03 PROCEDURE — 80061 LIPID PANEL: CPT

## 2023-05-24 DIAGNOSIS — F32.A ANXIETY AND DEPRESSION: ICD-10-CM

## 2023-05-24 DIAGNOSIS — F41.9 ANXIETY AND DEPRESSION: ICD-10-CM

## 2023-05-24 RX ORDER — SERTRALINE HYDROCHLORIDE 100 MG/1
TABLET, FILM COATED ORAL
Qty: 45 TABLET | Refills: 0 | Status: SHIPPED | OUTPATIENT
Start: 2023-05-24

## 2023-05-24 NOTE — TELEPHONE ENCOUNTER
Requested Prescriptions     Pending Prescriptions Disp Refills    sertraline (ZOLOFT) 100 MG tablet [Pharmacy Med Name: Sertraline HCl Oral Tablet 100 MG] 45 tablet 0     Sig: TAKE 1 AND 1/2 TABLETS BY MOUTH DAILY       Next appt is 7/12/2023  Last appt was 4/5/2023

## 2023-06-28 ENCOUNTER — PROCEDURE VISIT (OUTPATIENT)
Dept: PODIATRY | Age: 33
End: 2023-06-28
Payer: MEDICARE

## 2023-06-28 VITALS — HEIGHT: 65 IN | WEIGHT: 271 LBS | BODY MASS INDEX: 45.15 KG/M2

## 2023-06-28 DIAGNOSIS — M79.674 PAIN OF TOE OF RIGHT FOOT: ICD-10-CM

## 2023-06-28 DIAGNOSIS — R26.2 DIFFICULTY WALKING: ICD-10-CM

## 2023-06-28 DIAGNOSIS — M79.675 PAIN OF TOE OF LEFT FOOT: ICD-10-CM

## 2023-06-28 DIAGNOSIS — M25.372 INSTABILITY OF LEFT ANKLE JOINT: ICD-10-CM

## 2023-06-28 DIAGNOSIS — L60.0 OC (ONYCHOCRYPTOSIS): Primary | ICD-10-CM

## 2023-06-28 DIAGNOSIS — Q66.89 CLUBFOOT, CONGENITAL: ICD-10-CM

## 2023-06-28 DIAGNOSIS — D82.1 DIGEORGE SYNDROME (HCC): ICD-10-CM

## 2023-06-28 PROCEDURE — 99213 OFFICE O/P EST LOW 20 MIN: CPT | Performed by: PODIATRIST

## 2023-07-13 ENCOUNTER — TELEPHONE (OUTPATIENT)
Dept: FAMILY MEDICINE CLINIC | Age: 33
End: 2023-07-13

## 2023-07-13 NOTE — TELEPHONE ENCOUNTER
[phone] no longer in service and the alternate number [phone]  does not have voice mail set up    This patient and her mother did not show for their appointment yesterday, attempting to reschedule with Sarah Murguia

## 2023-07-14 DIAGNOSIS — K21.9 GASTROESOPHAGEAL REFLUX DISEASE WITHOUT ESOPHAGITIS: ICD-10-CM

## 2023-07-14 DIAGNOSIS — F41.9 ANXIETY AND DEPRESSION: ICD-10-CM

## 2023-07-14 DIAGNOSIS — F32.A ANXIETY AND DEPRESSION: ICD-10-CM

## 2023-07-14 RX ORDER — SERTRALINE HYDROCHLORIDE 100 MG/1
TABLET, FILM COATED ORAL
Qty: 45 TABLET | Refills: 0 | Status: SHIPPED | OUTPATIENT
Start: 2023-07-14

## 2023-07-14 RX ORDER — FAMOTIDINE 20 MG/1
TABLET, FILM COATED ORAL
Qty: 60 TABLET | Refills: 0 | Status: SHIPPED | OUTPATIENT
Start: 2023-07-14

## 2023-07-19 ENCOUNTER — OFFICE VISIT (OUTPATIENT)
Dept: FAMILY MEDICINE CLINIC | Age: 33
End: 2023-07-19
Payer: MEDICARE

## 2023-07-19 VITALS
TEMPERATURE: 97.2 F | DIASTOLIC BLOOD PRESSURE: 80 MMHG | HEART RATE: 74 BPM | HEIGHT: 65 IN | SYSTOLIC BLOOD PRESSURE: 132 MMHG | OXYGEN SATURATION: 98 % | BODY MASS INDEX: 44.32 KG/M2 | RESPIRATION RATE: 16 BRPM | WEIGHT: 266 LBS

## 2023-07-19 DIAGNOSIS — R26.81 GAIT INSTABILITY: Primary | ICD-10-CM

## 2023-07-19 DIAGNOSIS — Q66.89 CLUBFOOT, CONGENITAL: ICD-10-CM

## 2023-07-19 DIAGNOSIS — N64.4 BREAST PAIN, LEFT: ICD-10-CM

## 2023-07-19 DIAGNOSIS — D82.1 DIGEORGE SYNDROME (HCC): ICD-10-CM

## 2023-07-19 DIAGNOSIS — F33.1 MODERATE EPISODE OF RECURRENT MAJOR DEPRESSIVE DISORDER (HCC): ICD-10-CM

## 2023-07-19 PROCEDURE — 99214 OFFICE O/P EST MOD 30 MIN: CPT | Performed by: NURSE PRACTITIONER

## 2023-07-19 ASSESSMENT — PATIENT HEALTH QUESTIONNAIRE - PHQ9
9. THOUGHTS THAT YOU WOULD BE BETTER OFF DEAD, OR OF HURTING YOURSELF: 1
SUM OF ALL RESPONSES TO PHQ9 QUESTIONS 1 & 2: 0
SUM OF ALL RESPONSES TO PHQ QUESTIONS 1-9: 16
10. IF YOU CHECKED OFF ANY PROBLEMS, HOW DIFFICULT HAVE THESE PROBLEMS MADE IT FOR YOU TO DO YOUR WORK, TAKE CARE OF THINGS AT HOME, OR GET ALONG WITH OTHER PEOPLE: 1
4. FEELING TIRED OR HAVING LITTLE ENERGY: 3
2. FEELING DOWN, DEPRESSED OR HOPELESS: 0
SUM OF ALL RESPONSES TO PHQ QUESTIONS 1-9: 16
5. POOR APPETITE OR OVEREATING: 3
SUM OF ALL RESPONSES TO PHQ QUESTIONS 1-9: 0
SUM OF ALL RESPONSES TO PHQ QUESTIONS 1-9: 0
3. TROUBLE FALLING OR STAYING ASLEEP: 3
2. FEELING DOWN, DEPRESSED OR HOPELESS: 3
6. FEELING BAD ABOUT YOURSELF - OR THAT YOU ARE A FAILURE OR HAVE LET YOURSELF OR YOUR FAMILY DOWN: 0
SUM OF ALL RESPONSES TO PHQ QUESTIONS 1-9: 16
1. LITTLE INTEREST OR PLEASURE IN DOING THINGS: 0
SUM OF ALL RESPONSES TO PHQ QUESTIONS 1-9: 15
SUM OF ALL RESPONSES TO PHQ QUESTIONS 1-9: 0
8. MOVING OR SPEAKING SO SLOWLY THAT OTHER PEOPLE COULD HAVE NOTICED. OR THE OPPOSITE, BEING SO FIGETY OR RESTLESS THAT YOU HAVE BEEN MOVING AROUND A LOT MORE THAN USUAL: 0
SUM OF ALL RESPONSES TO PHQ QUESTIONS 1-9: 0
7. TROUBLE CONCENTRATING ON THINGS, SUCH AS READING THE NEWSPAPER OR WATCHING TELEVISION: 3

## 2023-07-19 ASSESSMENT — ENCOUNTER SYMPTOMS
DIARRHEA: 0
SHORTNESS OF BREATH: 0
VOMITING: 0
CONSTIPATION: 0
SORE THROAT: 1
NAUSEA: 0
BACK PAIN: 1
COUGH: 0
WHEEZING: 0

## 2023-07-19 ASSESSMENT — COLUMBIA-SUICIDE SEVERITY RATING SCALE - C-SSRS
6. HAVE YOU EVER DONE ANYTHING, STARTED TO DO ANYTHING, OR PREPARED TO DO ANYTHING TO END YOUR LIFE?: NO
7. DID THIS OCCUR IN THE LAST THREE MONTHS: YES
1. WITHIN THE PAST MONTH, HAVE YOU WISHED YOU WERE DEAD OR WISHED YOU COULD GO TO SLEEP AND NOT WAKE UP?: YES
2. HAVE YOU ACTUALLY HAD ANY THOUGHTS OF KILLING YOURSELF?: NO

## 2023-08-17 ENCOUNTER — TELEPHONE (OUTPATIENT)
Dept: FAMILY MEDICINE CLINIC | Age: 33
End: 2023-08-17

## 2023-08-17 NOTE — TELEPHONE ENCOUNTER
I do not prescribe Paxlovid for Covid. Better off with Zinc 50 mg per day.  Vitamin D 5,000 IU per day and supportive fluids and elecrolytes and fever control.,,,,,,,,,,,,,,,,,djf

## 2023-08-17 NOTE — TELEPHONE ENCOUNTER
Jazmineaide Poncho tested positive for covid 8/15/23, Qamar Cheema has the same symptoms (congestion, cough, headache, body aches, vomiting and diarrhea)    She would like paxlovid for Qamar Cheema. Please advise.

## 2023-08-23 ENCOUNTER — TELEPHONE (OUTPATIENT)
Dept: FAMILY MEDICINE CLINIC | Age: 33
End: 2023-08-23

## 2023-08-23 NOTE — TELEPHONE ENCOUNTER
Armen Null tested positive for Covid a week ago and now [place] is positive, Armen Null would like to know if she should take [place] to the hospital or treat with the vitamin regimen as well. Harinder started with cough and sneezing couple days ago, and last night woke up in a sweat and having slight difficulties breathing. Please advise. Detail Level: Detailed Depth Of Biopsy: dermis Was A Bandage Applied: Yes Size Of Lesion In Cm: 0.3 X Size Of Lesion In Cm: 0 Biopsy Type: H and E Biopsy Method: double edge Personna blade Anesthesia Type: 1% lidocaine with epinephrine Anesthesia Volume In Cc (Will Not Render If 0): 1 Hemostasis: Drysol Wound Care: Aquaphor Dressing: bandage Destruction After The Procedure: No Type Of Destruction Used: Curettage Curettage Text: The wound bed was treated with curettage after the biopsy was performed. Cryotherapy Text: The wound bed was treated with cryotherapy after the biopsy was performed. Electrodesiccation Text: The wound bed was treated with electrodesiccation after the biopsy was performed. Electrodesiccation And Curettage Text: The wound bed was treated with electrodesiccation and curettage after the biopsy was performed. Silver Nitrate Text: The wound bed was treated with silver nitrate after the biopsy was performed. Lab: [id number] Lab Facility: [id number] Consent: Verbal consent was obtained and risks were reviewed including but not limited to scarring, infection, bleeding, scabbing, incomplete removal, nerve damage and allergy to anesthesia. Post-Care Instructions: I reviewed with the patient in detail post-care instructions. Patient is to keep the biopsy site dry overnight, and then apply Aquaphor twice daily until healed. Patient may apply hydrogen peroxide soaks to remove any crusting. Notification Instructions: Patient will be notified of biopsy results. However, patient instructed to call the office if not contacted within 2 weeks. Billing Type: Third-Party Bill Information: Selecting Yes will display possible errors in your note based on the variables you have selected. This validation is only offered as a suggestion for you. PLEASE NOTE THAT THE VALIDATION TEXT WILL BE REMOVED WHEN YOU FINALIZE YOUR NOTE. IF YOU WANT TO FAX A PRELIMINARY NOTE YOU WILL NEED TO TOGGLE THIS TO 'NO' IF YOU DO NOT WANT IT IN YOUR FAXED NOTE. Lab: [id number] Lab Facility: [id number] Billing Type: Third-Party Bill

## 2023-08-30 ENCOUNTER — PROCEDURE VISIT (OUTPATIENT)
Dept: PODIATRY | Age: 33
End: 2023-08-30
Payer: MEDICARE

## 2023-08-30 VITALS — HEIGHT: 65 IN | BODY MASS INDEX: 44.32 KG/M2 | WEIGHT: 266 LBS

## 2023-08-30 DIAGNOSIS — L60.0 OC (ONYCHOCRYPTOSIS): Primary | ICD-10-CM

## 2023-08-30 DIAGNOSIS — D82.1 DIGEORGE SYNDROME (HCC): ICD-10-CM

## 2023-08-30 DIAGNOSIS — Q66.89 CLUBFOOT, CONGENITAL: ICD-10-CM

## 2023-08-30 DIAGNOSIS — M79.675 PAIN OF TOE OF LEFT FOOT: ICD-10-CM

## 2023-08-30 DIAGNOSIS — M79.674 PAIN OF TOE OF RIGHT FOOT: ICD-10-CM

## 2023-08-30 DIAGNOSIS — R26.2 DIFFICULTY WALKING: ICD-10-CM

## 2023-08-30 PROCEDURE — G8417 CALC BMI ABV UP PARAM F/U: HCPCS | Performed by: PODIATRIST

## 2023-08-30 PROCEDURE — 99213 OFFICE O/P EST LOW 20 MIN: CPT | Performed by: PODIATRIST

## 2023-08-30 PROCEDURE — G8427 DOCREV CUR MEDS BY ELIG CLIN: HCPCS | Performed by: PODIATRIST

## 2023-08-30 PROCEDURE — 1036F TOBACCO NON-USER: CPT | Performed by: PODIATRIST

## 2023-08-30 NOTE — PROGRESS NOTES
Patient in today for nail care. Patient does not have any complaints of pain at this time.  Patient's PCP is Irene Guevara DO date of last ov 7/19/23        Zaria Guillermo LPN

## 2023-08-30 NOTE — PROGRESS NOTES
23     Darlin Montez    : 1990   Sex: female    Age: 34 y.o. Patient's PCP/Provider is:  Barbara Gomez DO    Subjective:  Patient is seen today for follow-up regarding continued evaluation regarding chronic ingrown nail issues multiple foot. Patient did not get evaluated for the new AFO device as recommended due to some illnesses recently. Patient is in need of new custom shoe gear as well. No other additional abnormalities noted at this time. Chief Complaint   Patient presents with    Nail Problem     Nail care       ROS:  Const: Positives and pertinent negatives as per HPI. Musculo: Denies symptoms other than stated above. Neuro: Denies symptoms other than stated above. Skin: Denies symptoms other than stated above. Current Medications:    Current Outpatient Medications:     sertraline (ZOLOFT) 100 MG tablet, TAKE 1 AND 1/2 TABLETS BY MOUTH DAILY, Disp: 45 tablet, Rfl: 0    famotidine (PEPCID) 20 MG tablet, TAKE ONE TABLET BY MOUTH TWO TIMES A DAY, Disp: 60 tablet, Rfl: 0    vitamin D3 (NATURAL VITAMIN D-3) 125 MCG (5000 UT) TABS tablet, Take one tablet by mouth daily, Disp: 30 tablet, Rfl: 0    calcium carbonate 1500 (600 Ca) MG TABS tablet, TAKE TWO TABLETS BY MOUTH THREE TIMES A DAY, Disp: 180 tablet, Rfl: 0    ibuprofen (IBU) 800 MG tablet, TAKE ONE TABLET BY MOUTH EVERY 8 HOURS AS NEEDED FOR PAIN, Disp: 90 tablet, Rfl: 0    calcitRIOL (ROCALTROL) 0.5 MCG capsule, Take 1 capsule by mouth 2 times daily, Disp: 180 capsule, Rfl: 0    clobetasol (TEMOVATE) 0.05 % cream, Apply topically 2 times daily Apply topically 2 times daily. , Disp: 30 g, Rfl: 5    Ciclopirox 1 % SHAM, USE EVERY OTHER DAY AS A SHAMPOO, Disp: 120 mL, Rfl: 5    Clobetasol Propionate Emulsion 0.05 % FOAM, Apply 1 applicator topically daily as needed (psoriasis), Disp: 1 each, Rfl: 2    metFORMIN (GLUCOPHAGE-XR) 750 MG extended release tablet, Take 1 tablet by mouth daily (with breakfast), Disp: 30 tablet,

## 2023-09-08 DIAGNOSIS — E11.9 NEW ONSET TYPE 2 DIABETES MELLITUS (HCC): ICD-10-CM

## 2023-09-08 DIAGNOSIS — K21.9 GASTROESOPHAGEAL REFLUX DISEASE WITHOUT ESOPHAGITIS: ICD-10-CM

## 2023-09-08 DIAGNOSIS — F32.A ANXIETY AND DEPRESSION: ICD-10-CM

## 2023-09-08 DIAGNOSIS — F41.9 ANXIETY AND DEPRESSION: ICD-10-CM

## 2023-09-11 RX ORDER — FAMOTIDINE 20 MG/1
TABLET, FILM COATED ORAL
Qty: 60 TABLET | Refills: 0 | Status: SHIPPED | OUTPATIENT
Start: 2023-09-11

## 2023-09-11 RX ORDER — SERTRALINE HYDROCHLORIDE 100 MG/1
TABLET, FILM COATED ORAL
Qty: 45 TABLET | Refills: 0 | Status: SHIPPED | OUTPATIENT
Start: 2023-09-11

## 2023-09-11 RX ORDER — METFORMIN HYDROCHLORIDE 750 MG/1
TABLET, EXTENDED RELEASE ORAL
Qty: 30 TABLET | Refills: 0 | Status: SHIPPED | OUTPATIENT
Start: 2023-09-11

## 2023-09-18 ENCOUNTER — TELEPHONE (OUTPATIENT)
Dept: NON INVASIVE DIAGNOSTICS | Age: 33
End: 2023-09-18

## 2023-09-18 NOTE — TELEPHONE ENCOUNTER
Routed soon to  echo from  to Dr. Bibi Galicia to check on renewal of order or cancellation of test.  Awaiting on response.      Rik Bui RN,BSN

## 2023-10-26 DIAGNOSIS — F41.9 ANXIETY AND DEPRESSION: ICD-10-CM

## 2023-10-26 DIAGNOSIS — F32.A ANXIETY AND DEPRESSION: ICD-10-CM

## 2023-10-26 DIAGNOSIS — K21.9 GASTROESOPHAGEAL REFLUX DISEASE WITHOUT ESOPHAGITIS: ICD-10-CM

## 2023-10-26 DIAGNOSIS — E11.9 NEW ONSET TYPE 2 DIABETES MELLITUS (HCC): ICD-10-CM

## 2023-10-26 RX ORDER — FAMOTIDINE 20 MG/1
TABLET, FILM COATED ORAL
Qty: 60 TABLET | Refills: 0 | Status: SHIPPED | OUTPATIENT
Start: 2023-10-26

## 2023-10-26 RX ORDER — METFORMIN HYDROCHLORIDE 750 MG/1
TABLET, EXTENDED RELEASE ORAL
Qty: 30 TABLET | Refills: 0 | Status: SHIPPED | OUTPATIENT
Start: 2023-10-26

## 2023-10-26 RX ORDER — SERTRALINE HYDROCHLORIDE 100 MG/1
TABLET, FILM COATED ORAL
Qty: 45 TABLET | Refills: 0 | Status: SHIPPED | OUTPATIENT
Start: 2023-10-26

## 2023-10-26 NOTE — TELEPHONE ENCOUNTER
Requested Prescriptions     Pending Prescriptions Disp Refills    sertraline (ZOLOFT) 100 MG tablet [Pharmacy Med Name: Sertraline HCl Oral Tablet 100 MG] 45 tablet 0     Sig: TAKE 1 & 1/2 TABLETS BY MOUTH DAILY    famotidine (PEPCID) 20 MG tablet [Pharmacy Med Name: Famotidine Oral Tablet 20 MG] 60 tablet 0     Sig: TAKE ONE TABLET BY MOUTH TWO TIMES A DAY    metFORMIN (GLUCOPHAGE-XR) 750 MG extended release tablet [Pharmacy Med Name: metFORMIN HCl ER Oral Tablet Extended Release 24 Hour 750 MG] 30 tablet 0     Sig: TAKE ONE TABLET BY MOUTH DAILY WITH BREAKFAST       Next appt is Visit date not found  Last appt was 7/19/2023

## 2023-11-01 ENCOUNTER — PROCEDURE VISIT (OUTPATIENT)
Dept: PODIATRY | Age: 33
End: 2023-11-01
Payer: MEDICARE

## 2023-11-01 VITALS — BODY MASS INDEX: 44.32 KG/M2 | HEIGHT: 65 IN | WEIGHT: 266 LBS

## 2023-11-01 DIAGNOSIS — M79.674 PAIN OF TOE OF RIGHT FOOT: ICD-10-CM

## 2023-11-01 DIAGNOSIS — Q66.89 CLUBFOOT, CONGENITAL: ICD-10-CM

## 2023-11-01 DIAGNOSIS — R26.2 DIFFICULTY WALKING: ICD-10-CM

## 2023-11-01 DIAGNOSIS — M79.675 PAIN OF TOE OF LEFT FOOT: ICD-10-CM

## 2023-11-01 DIAGNOSIS — L60.0 OC (ONYCHOCRYPTOSIS): Primary | ICD-10-CM

## 2023-11-01 DIAGNOSIS — D82.1 DIGEORGE SYNDROME (HCC): ICD-10-CM

## 2023-11-01 PROCEDURE — 1036F TOBACCO NON-USER: CPT | Performed by: PODIATRIST

## 2023-11-01 PROCEDURE — 99213 OFFICE O/P EST LOW 20 MIN: CPT | Performed by: PODIATRIST

## 2023-11-01 PROCEDURE — G8484 FLU IMMUNIZE NO ADMIN: HCPCS | Performed by: PODIATRIST

## 2023-11-01 PROCEDURE — G8417 CALC BMI ABV UP PARAM F/U: HCPCS | Performed by: PODIATRIST

## 2023-11-01 PROCEDURE — G8427 DOCREV CUR MEDS BY ELIG CLIN: HCPCS | Performed by: PODIATRIST

## 2023-11-01 NOTE — PROGRESS NOTES
23     Amy Lopez    : 1990   Sex: female    Age: 34 y.o. Patient's PCP/Provider is:  Deena Norris DO    Subjective:  Patient is seen today for follow-up regarding continued care regarding nail dystrophy ingrown issues to both feet. No other additional abnormalities noted. Chief Complaint   Patient presents with    Nail Problem     Routine nail care        ROS:  Const: Positives and pertinent negatives as per HPI. Musculo: Denies symptoms other than stated above. Neuro: Denies symptoms other than stated above. Skin: Denies symptoms other than stated above. Current Medications:    Current Outpatient Medications:     sertraline (ZOLOFT) 100 MG tablet, TAKE 1 & 1/2 TABLETS BY MOUTH DAILY, Disp: 45 tablet, Rfl: 0    famotidine (PEPCID) 20 MG tablet, TAKE ONE TABLET BY MOUTH TWO TIMES A DAY, Disp: 60 tablet, Rfl: 0    metFORMIN (GLUCOPHAGE-XR) 750 MG extended release tablet, TAKE ONE TABLET BY MOUTH DAILY WITH BREAKFAST, Disp: 30 tablet, Rfl: 0    vitamin D3 (NATURAL VITAMIN D-3) 125 MCG (5000 UT) TABS tablet, Take one tablet by mouth daily, Disp: 30 tablet, Rfl: 0    calcium carbonate 1500 (600 Ca) MG TABS tablet, TAKE TWO TABLETS BY MOUTH THREE TIMES A DAY, Disp: 180 tablet, Rfl: 0    ibuprofen (IBU) 800 MG tablet, TAKE ONE TABLET BY MOUTH EVERY 8 HOURS AS NEEDED FOR PAIN, Disp: 90 tablet, Rfl: 0    calcitRIOL (ROCALTROL) 0.5 MCG capsule, Take 1 capsule by mouth 2 times daily, Disp: 180 capsule, Rfl: 0    clobetasol (TEMOVATE) 0.05 % cream, Apply topically 2 times daily Apply topically 2 times daily. , Disp: 30 g, Rfl: 5    Ciclopirox 1 % SHAM, USE EVERY OTHER DAY AS A SHAMPOO, Disp: 120 mL, Rfl: 5    Clobetasol Propionate Emulsion 0.05 % FOAM, Apply 1 applicator topically daily as needed (psoriasis), Disp: 1 each, Rfl: 2    levETIRAcetam (KEPPRA) 750 MG tablet, Take 1 tablet by mouth 2 times daily, Disp: 60 tablet, Rfl: 11    Fiber Select Gummies CHEW, Take 2 gummies daily as

## 2023-11-01 NOTE — PROGRESS NOTES
Patient in today for nail care. Patient does not have any complaints of pain at this time. Patient's PCP is Aj Mistry,  date of last ov 08/17/2023.          Chas Olivares LPN

## 2023-11-16 DIAGNOSIS — J45.30 MILD PERSISTENT ASTHMA WITHOUT COMPLICATION: ICD-10-CM

## 2023-11-16 RX ORDER — ALBUTEROL SULFATE 90 UG/1
2 AEROSOL, METERED RESPIRATORY (INHALATION) EVERY 6 HOURS PRN
Qty: 2 EACH | Refills: 2 | Status: SHIPPED | OUTPATIENT
Start: 2023-11-16

## 2023-11-16 NOTE — TELEPHONE ENCOUNTER
Requested Prescriptions     Pending Prescriptions Disp Refills    albuterol sulfate HFA (PROVENTIL;VENTOLIN;PROAIR) 108 (90 Base) MCG/ACT inhaler 2 each 2     Sig: Inhale 2 puffs into the lungs every 6 hours as needed for Wheezing or Shortness of Breath       Next appt is Visit date not found  Last appt was 7/19/2023

## 2023-11-22 DIAGNOSIS — F41.9 ANXIETY AND DEPRESSION: ICD-10-CM

## 2023-11-22 DIAGNOSIS — E20.9 HYPOPARATHYROIDISM, UNSPECIFIED HYPOPARATHYROIDISM TYPE (HCC): ICD-10-CM

## 2023-11-22 DIAGNOSIS — K21.9 GASTROESOPHAGEAL REFLUX DISEASE WITHOUT ESOPHAGITIS: ICD-10-CM

## 2023-11-22 DIAGNOSIS — E11.9 NEW ONSET TYPE 2 DIABETES MELLITUS (HCC): ICD-10-CM

## 2023-11-22 DIAGNOSIS — E83.51 HYPOCALCEMIA: ICD-10-CM

## 2023-11-22 DIAGNOSIS — F32.A ANXIETY AND DEPRESSION: ICD-10-CM

## 2023-11-22 RX ORDER — METFORMIN HYDROCHLORIDE 750 MG/1
TABLET, EXTENDED RELEASE ORAL
Qty: 30 TABLET | Refills: 0 | Status: SHIPPED | OUTPATIENT
Start: 2023-11-22

## 2023-11-22 RX ORDER — FAMOTIDINE 20 MG/1
TABLET, FILM COATED ORAL
Qty: 60 TABLET | Refills: 0 | Status: SHIPPED | OUTPATIENT
Start: 2023-11-22

## 2023-11-22 RX ORDER — SERTRALINE HYDROCHLORIDE 100 MG/1
TABLET, FILM COATED ORAL
Qty: 45 TABLET | Refills: 0 | Status: SHIPPED | OUTPATIENT
Start: 2023-11-22

## 2023-11-22 RX ORDER — CALCITRIOL 0.5 UG/1
0.5 CAPSULE, LIQUID FILLED ORAL 2 TIMES DAILY
Qty: 180 CAPSULE | Refills: 0 | Status: SHIPPED | OUTPATIENT
Start: 2023-11-22

## 2023-12-01 DIAGNOSIS — N64.4 BREAST PAIN, LEFT: Primary | ICD-10-CM

## 2023-12-07 ENCOUNTER — TELEPHONE (OUTPATIENT)
Dept: FAMILY MEDICINE CLINIC | Age: 33
End: 2023-12-07

## 2023-12-07 NOTE — TELEPHONE ENCOUNTER
----- Message from Rasheed Craig sent at 12/7/2023  8:40 AM EST -----  Subject: Message to Provider    QUESTIONS  Information for Provider? pt would like to get her ears clean out  ---------------------------------------------------------------------------  --------------  [address]  [phone]; OK to leave message on voicemail  ---------------------------------------------------------------------------  --------------  SCRIPT ANSWERS  Relationship to Patient? Parent  Representative Name? artur mother   Patient is under 25 and the Parent has custody? No  Is the representative on the Communication Release of Information (TRAY)   form in Epic?  Yes

## 2023-12-11 ENCOUNTER — OFFICE VISIT (OUTPATIENT)
Dept: FAMILY MEDICINE CLINIC | Age: 33
End: 2023-12-11
Payer: MEDICARE

## 2023-12-11 VITALS
RESPIRATION RATE: 16 BRPM | HEART RATE: 111 BPM | OXYGEN SATURATION: 96 % | BODY MASS INDEX: 41.82 KG/M2 | HEIGHT: 65 IN | WEIGHT: 251 LBS | TEMPERATURE: 98 F | SYSTOLIC BLOOD PRESSURE: 110 MMHG | DIASTOLIC BLOOD PRESSURE: 80 MMHG

## 2023-12-11 DIAGNOSIS — E55.9 VITAMIN D DEFICIENCY: ICD-10-CM

## 2023-12-11 DIAGNOSIS — E83.51 HYPOCALCEMIA: ICD-10-CM

## 2023-12-11 DIAGNOSIS — E66.01 OBESITY, CLASS III, BMI 40-49.9 (MORBID OBESITY) (HCC): ICD-10-CM

## 2023-12-11 DIAGNOSIS — H61.23 BILATERAL IMPACTED CERUMEN: ICD-10-CM

## 2023-12-11 DIAGNOSIS — R56.9 SEIZURE (HCC): ICD-10-CM

## 2023-12-11 DIAGNOSIS — E20.9 HYPOPARATHYROIDISM, UNSPECIFIED HYPOPARATHYROIDISM TYPE (HCC): ICD-10-CM

## 2023-12-11 DIAGNOSIS — B36.9 FUNGAL DERMATITIS: ICD-10-CM

## 2023-12-11 DIAGNOSIS — L40.9 PSORIASIS: ICD-10-CM

## 2023-12-11 DIAGNOSIS — F33.1 MODERATE EPISODE OF RECURRENT MAJOR DEPRESSIVE DISORDER (HCC): ICD-10-CM

## 2023-12-11 DIAGNOSIS — E11.9 NEW ONSET TYPE 2 DIABETES MELLITUS (HCC): Primary | ICD-10-CM

## 2023-12-11 LAB — HBA1C MFR BLD: 6.3 %

## 2023-12-11 PROCEDURE — 99214 OFFICE O/P EST MOD 30 MIN: CPT | Performed by: NURSE PRACTITIONER

## 2023-12-11 PROCEDURE — 3044F HG A1C LEVEL LT 7.0%: CPT | Performed by: NURSE PRACTITIONER

## 2023-12-11 PROCEDURE — 83036 HEMOGLOBIN GLYCOSYLATED A1C: CPT | Performed by: NURSE PRACTITIONER

## 2023-12-11 ASSESSMENT — PATIENT HEALTH QUESTIONNAIRE - PHQ9
8. MOVING OR SPEAKING SO SLOWLY THAT OTHER PEOPLE COULD HAVE NOTICED. OR THE OPPOSITE, BEING SO FIGETY OR RESTLESS THAT YOU HAVE BEEN MOVING AROUND A LOT MORE THAN USUAL: 1
SUM OF ALL RESPONSES TO PHQ QUESTIONS 1-9: 18
1. LITTLE INTEREST OR PLEASURE IN DOING THINGS: 2
9. THOUGHTS THAT YOU WOULD BE BETTER OFF DEAD, OR OF HURTING YOURSELF: 2
4. FEELING TIRED OR HAVING LITTLE ENERGY: 2
5. POOR APPETITE OR OVEREATING: 2
SUM OF ALL RESPONSES TO PHQ QUESTIONS 1-9: 16
3. TROUBLE FALLING OR STAYING ASLEEP: 2
SUM OF ALL RESPONSES TO PHQ QUESTIONS 1-9: 18
2. FEELING DOWN, DEPRESSED OR HOPELESS: 2
SUM OF ALL RESPONSES TO PHQ QUESTIONS 1-9: 18
SUM OF ALL RESPONSES TO PHQ9 QUESTIONS 1 & 2: 4
7. TROUBLE CONCENTRATING ON THINGS, SUCH AS READING THE NEWSPAPER OR WATCHING TELEVISION: 2
10. IF YOU CHECKED OFF ANY PROBLEMS, HOW DIFFICULT HAVE THESE PROBLEMS MADE IT FOR YOU TO DO YOUR WORK, TAKE CARE OF THINGS AT HOME, OR GET ALONG WITH OTHER PEOPLE: 1
6. FEELING BAD ABOUT YOURSELF - OR THAT YOU ARE A FAILURE OR HAVE LET YOURSELF OR YOUR FAMILY DOWN: 3

## 2023-12-11 ASSESSMENT — ENCOUNTER SYMPTOMS
WHEEZING: 0
SHORTNESS OF BREATH: 1
NAUSEA: 0
COUGH: 0
VOMITING: 0
CONSTIPATION: 0
DIARRHEA: 0

## 2023-12-11 ASSESSMENT — COLUMBIA-SUICIDE SEVERITY RATING SCALE - C-SSRS
1. WITHIN THE PAST MONTH, HAVE YOU WISHED YOU WERE DEAD OR WISHED YOU COULD GO TO SLEEP AND NOT WAKE UP?: NO
6. HAVE YOU EVER DONE ANYTHING, STARTED TO DO ANYTHING, OR PREPARED TO DO ANYTHING TO END YOUR LIFE?: NO
4. HAVE YOU HAD THESE THOUGHTS AND HAD SOME INTENTION OF ACTING ON THEM?: NO
3. HAVE YOU BEEN THINKING ABOUT HOW YOU MIGHT KILL YOURSELF?: NO
5. HAVE YOU STARTED TO WORK OUT OR WORKED OUT THE DETAILS OF HOW TO KILL YOURSELF? DO YOU INTEND TO CARRY OUT THIS PLAN?: NO
2. HAVE YOU ACTUALLY HAD ANY THOUGHTS OF KILLING YOURSELF?: YES

## 2024-01-10 DIAGNOSIS — G40.409 OTHER GENERALIZED EPILEPSY, NOT INTRACTABLE, WITHOUT STATUS EPILEPTICUS (HCC): ICD-10-CM

## 2024-01-10 DIAGNOSIS — D82.1 DIGEORGE SYNDROME (HCC): ICD-10-CM

## 2024-01-10 DIAGNOSIS — M17.12 PRIMARY OSTEOARTHRITIS OF LEFT KNEE: ICD-10-CM

## 2024-01-10 RX ORDER — IBUPROFEN 800 MG/1
TABLET ORAL
Qty: 90 TABLET | Refills: 0 | Status: SHIPPED | OUTPATIENT
Start: 2024-01-10

## 2024-01-10 RX ORDER — LEVETIRACETAM 750 MG/1
750 TABLET ORAL 2 TIMES DAILY
Qty: 60 TABLET | Refills: 11 | Status: SHIPPED | OUTPATIENT
Start: 2024-01-10

## 2024-01-10 NOTE — TELEPHONE ENCOUNTER
Requested Prescriptions     Pending Prescriptions Disp Refills    levETIRAcetam (KEPPRA) 750 MG tablet 60 tablet 11     Sig: Take 1 tablet by mouth 2 times daily    ibuprofen (IBU) 800 MG tablet 90 tablet 0     Sig: TAKE ONE TABLET BY MOUTH EVERY 8 HOURS AS NEEDED FOR PAIN       Next appt is Visit date not found  Last appt was 12/11/2023

## 2024-02-04 DIAGNOSIS — F41.9 ANXIETY AND DEPRESSION: ICD-10-CM

## 2024-02-04 DIAGNOSIS — F32.A ANXIETY AND DEPRESSION: ICD-10-CM

## 2024-02-04 DIAGNOSIS — K21.9 GASTROESOPHAGEAL REFLUX DISEASE WITHOUT ESOPHAGITIS: ICD-10-CM

## 2024-02-04 DIAGNOSIS — E11.9 NEW ONSET TYPE 2 DIABETES MELLITUS (HCC): ICD-10-CM

## 2024-02-05 RX ORDER — METFORMIN HYDROCHLORIDE 750 MG/1
TABLET, EXTENDED RELEASE ORAL
Qty: 30 TABLET | Refills: 0 | Status: SHIPPED | OUTPATIENT
Start: 2024-02-05

## 2024-02-05 RX ORDER — FAMOTIDINE 20 MG/1
TABLET, FILM COATED ORAL
Qty: 60 TABLET | Refills: 0 | Status: SHIPPED | OUTPATIENT
Start: 2024-02-05

## 2024-02-05 RX ORDER — SERTRALINE HYDROCHLORIDE 100 MG/1
TABLET, FILM COATED ORAL
Qty: 45 TABLET | Refills: 0 | Status: SHIPPED | OUTPATIENT
Start: 2024-02-05

## 2024-02-22 ENCOUNTER — HOSPITAL ENCOUNTER (OUTPATIENT)
Age: 34
Discharge: HOME | End: 2024-02-22
Payer: MEDICARE

## 2024-02-22 LAB
ALBUMIN SERPL-MCNC: 3.9 G/DL (ref 3.5–5.2)
ALP SERPL-CCNC: 52 U/L (ref 35–104)
ALT SERPL-CCNC: 19 U/L (ref 0–32)
ANION GAP SERPL CALCULATED.3IONS-SCNC: 12 MMOL/L (ref 7–16)
AST SERPL-CCNC: 20 U/L (ref 0–31)
BASOPHILS # BLD: 0.03 K/UL (ref 0–0.2)
BASOPHILS NFR BLD: 0 % (ref 0–2)
BILIRUB SERPL-MCNC: 0.4 MG/DL (ref 0–1.2)
BUN SERPL-MCNC: 12 MG/DL (ref 6–20)
CALCIUM SERPL-MCNC: 8 MG/DL (ref 8.6–10.2)
CHLORIDE SERPL-SCNC: 97 MMOL/L (ref 98–107)
CO2 SERPL-SCNC: 28 MMOL/L (ref 22–29)
CREAT SERPL-MCNC: 0.6 MG/DL (ref 0.5–1)
EOSINOPHIL # BLD: 0.23 K/UL (ref 0.05–0.5)
EOSINOPHILS RELATIVE PERCENT: 2 % (ref 0–6)
ERYTHROCYTE [DISTWIDTH] IN BLOOD BY AUTOMATED COUNT: 14.1 % (ref 11.5–15)
GFR SERPL CREATININE-BSD FRML MDRD: >60 ML/MIN/1.73M2
GLUCOSE SERPL-MCNC: 145 MG/DL (ref 74–99)
HCT VFR BLD AUTO: 43.1 % (ref 34–48)
HGB BLD-MCNC: 13.9 G/DL (ref 11.5–15.5)
IMM GRANULOCYTES # BLD AUTO: 0.08 K/UL (ref 0–0.58)
IMM GRANULOCYTES NFR BLD: 1 % (ref 0–5)
LYMPHOCYTES NFR BLD: 2.83 K/UL (ref 1.5–4)
LYMPHOCYTES RELATIVE PERCENT: 24 % (ref 20–42)
MCH RBC QN AUTO: 28.4 PG (ref 26–35)
MCHC RBC AUTO-ENTMCNC: 32.3 G/DL (ref 32–34.5)
MCV RBC AUTO: 88.1 FL (ref 80–99.9)
MONOCYTES NFR BLD: 0.62 K/UL (ref 0.1–0.95)
MONOCYTES NFR BLD: 5 % (ref 2–12)
NEUTROPHILS NFR BLD: 67 % (ref 43–80)
NEUTS SEG NFR BLD: 7.84 K/UL (ref 1.8–7.3)
PLATELET # BLD AUTO: 146 K/UL (ref 130–450)
PMV BLD AUTO: 12.5 FL (ref 7–12)
POTASSIUM SERPL-SCNC: 3.9 MMOL/L (ref 3.5–5)
PROT SERPL-MCNC: 8.3 G/DL (ref 6.4–8.3)
RBC # BLD AUTO: 4.89 M/UL (ref 3.5–5.5)
SODIUM SERPL-SCNC: 137 MMOL/L (ref 132–146)
WBC OTHER # BLD: 11.6 K/UL (ref 4.5–11.5)

## 2024-02-22 PROCEDURE — 80053 COMPREHEN METABOLIC PANEL: CPT

## 2024-02-22 PROCEDURE — 85025 COMPLETE CBC W/AUTO DIFF WBC: CPT

## 2024-02-22 PROCEDURE — 86481 TB AG RESPONSE T-CELL SUSP: CPT

## 2024-02-22 PROCEDURE — 36415 COLL VENOUS BLD VENIPUNCTURE: CPT

## 2024-02-25 LAB — T SPOT TB TEST: NORMAL

## 2024-03-21 DIAGNOSIS — K21.9 GASTROESOPHAGEAL REFLUX DISEASE WITHOUT ESOPHAGITIS: ICD-10-CM

## 2024-03-21 DIAGNOSIS — E11.9 NEW ONSET TYPE 2 DIABETES MELLITUS (HCC): ICD-10-CM

## 2024-03-22 RX ORDER — FAMOTIDINE 20 MG/1
TABLET, FILM COATED ORAL
Qty: 60 TABLET | Refills: 0 | Status: SHIPPED | OUTPATIENT
Start: 2024-03-22

## 2024-03-22 RX ORDER — METFORMIN HYDROCHLORIDE 750 MG/1
TABLET, EXTENDED RELEASE ORAL
Qty: 30 TABLET | Refills: 0 | Status: SHIPPED | OUTPATIENT
Start: 2024-03-22

## 2024-03-24 DIAGNOSIS — E11.9 NEW ONSET TYPE 2 DIABETES MELLITUS (HCC): ICD-10-CM

## 2024-03-25 ENCOUNTER — TELEPHONE (OUTPATIENT)
Dept: FAMILY MEDICINE CLINIC | Age: 34
End: 2024-03-25

## 2024-03-25 RX ORDER — METFORMIN HYDROCHLORIDE 750 MG/1
TABLET, EXTENDED RELEASE ORAL
Qty: 30 TABLET | Refills: 0 | OUTPATIENT
Start: 2024-03-25

## 2024-03-25 NOTE — TELEPHONE ENCOUNTER
clobetasol (TEMOVATE) 0.05 % cream     Prior authorization approved Case ID: [id number]      Payer: [employer] - [employer]    [phone]   PA Case: [id number], Status: Approved, Coverage Starts on: 1/1/2024 12:00:00 AM, Coverage Ends on: 12/31/2024 12:00:00 AM. Questions? Contact [phone].   Approval Details    Authorized from January 1, 2024 to December 31, 2024      Electronic appeal: Not supported   Prior auth initiated by: [id number] - [place], [place] - [address] -  [phone] - F [phone]

## 2024-03-27 ENCOUNTER — PROCEDURE VISIT (OUTPATIENT)
Dept: PODIATRY | Age: 34
End: 2024-03-27
Payer: MEDICARE

## 2024-03-27 VITALS — HEIGHT: 65 IN | BODY MASS INDEX: 41.82 KG/M2 | WEIGHT: 251 LBS

## 2024-03-27 DIAGNOSIS — M79.674 PAIN OF TOE OF RIGHT FOOT: ICD-10-CM

## 2024-03-27 DIAGNOSIS — R26.2 DIFFICULTY WALKING: ICD-10-CM

## 2024-03-27 DIAGNOSIS — Q66.89 CLUBFOOT, CONGENITAL: ICD-10-CM

## 2024-03-27 DIAGNOSIS — L60.0 OC (ONYCHOCRYPTOSIS): Primary | ICD-10-CM

## 2024-03-27 DIAGNOSIS — D82.1 DIGEORGE SYNDROME (HCC): ICD-10-CM

## 2024-03-27 DIAGNOSIS — M79.675 PAIN OF TOE OF LEFT FOOT: ICD-10-CM

## 2024-03-27 PROCEDURE — 99213 OFFICE O/P EST LOW 20 MIN: CPT | Performed by: PODIATRIST

## 2024-03-27 NOTE — PROGRESS NOTES
3/27/24     Peng Crane    : 1990   Sex: female    Age: 34 y.o.    Patient's PCP/Provider is:  Paulie Garibay DO    Subjective:  Patient is seen today for follow-up regarding continued care regarding chronic ingrown nail issues noted multiple nails bilateral foot.  Patient has had a hard time wearing her shoes and socks regularly due to dystrophy present.  No other additional abnormalities noted at this time.    Chief Complaint   Patient presents with    Nail Problem     Nail care       ROS:  Const: Positives and pertinent negatives as per HPI.    Musculo: Denies symptoms other than stated above.  Neuro: Denies symptoms other than stated above.  Skin: Denies symptoms other than stated above.    Current Medications:    Current Outpatient Medications:     metFORMIN (GLUCOPHAGE-XR) 750 MG extended release tablet, TAKE ONE TABLET BY MOUTH DAILY with breakfast, Disp: 30 tablet, Rfl: 0    famotidine (PEPCID) 20 MG tablet, TAKE ONE TABLET BY MOUTH TWO TIMES A DAY, Disp: 60 tablet, Rfl: 0    sertraline (ZOLOFT) 100 MG tablet, TAKE 1 & 1/2 TABLETS BY MOUTH DAILY, Disp: 45 tablet, Rfl: 0    levETIRAcetam (KEPPRA) 750 MG tablet, Take 1 tablet by mouth 2 times daily, Disp: 60 tablet, Rfl: 11    ibuprofen (IBU) 800 MG tablet, TAKE ONE TABLET BY MOUTH EVERY 8 HOURS AS NEEDED FOR PAIN, Disp: 90 tablet, Rfl: 0    calcium carbonate 1500 (600 Ca) MG TABS tablet, TAKE TWO TABLETS BY MOUTH THREE TIMES A DAY, Disp: 180 tablet, Rfl: 0    vitamin D3 (NATURAL VITAMIN D-3) 125 MCG (5000 UT) TABS tablet, Take one tablet by mouth daily, Disp: 30 tablet, Rfl: 4    nystatin-triamcinolone (MYCOLOG II) 225714-7.1 UNIT/GM-% cream, Apply topically 2 times daily., Disp: 60 g, Rfl: 2    calcitRIOL (ROCALTROL) 0.5 MCG capsule, TAKE ONE CAPSULE BY MOUTH TWO TIMES A DAY, Disp: 180 capsule, Rfl: 0    albuterol sulfate HFA (PROVENTIL;VENTOLIN;PROAIR) 108 (90 Base) MCG/ACT inhaler, Inhale 2 puffs into the lungs every 6 hours as needed

## 2024-03-27 NOTE — PROGRESS NOTES
Patient in today for nail care. Patient does not have any complaints of pain at this time. Patient's PCP is Paulie Garibay DO date of last ov 12/11/23          Umm Campos LPN      29-Sep-2016

## 2024-04-20 DIAGNOSIS — E20.9 HYPOPARATHYROIDISM, UNSPECIFIED HYPOPARATHYROIDISM TYPE (HCC): ICD-10-CM

## 2024-04-20 DIAGNOSIS — E83.51 HYPOCALCEMIA: ICD-10-CM

## 2024-04-20 DIAGNOSIS — K21.9 GASTROESOPHAGEAL REFLUX DISEASE WITHOUT ESOPHAGITIS: ICD-10-CM

## 2024-04-20 DIAGNOSIS — E11.9 NEW ONSET TYPE 2 DIABETES MELLITUS (HCC): ICD-10-CM

## 2024-04-22 RX ORDER — METFORMIN HYDROCHLORIDE 750 MG/1
TABLET, EXTENDED RELEASE ORAL
Qty: 30 TABLET | Refills: 0 | Status: SHIPPED | OUTPATIENT
Start: 2024-04-22

## 2024-04-22 RX ORDER — FAMOTIDINE 20 MG/1
TABLET, FILM COATED ORAL
Qty: 60 TABLET | Refills: 0 | Status: SHIPPED | OUTPATIENT
Start: 2024-04-22

## 2024-05-16 DIAGNOSIS — F32.A ANXIETY AND DEPRESSION: ICD-10-CM

## 2024-05-16 DIAGNOSIS — F41.9 ANXIETY AND DEPRESSION: ICD-10-CM

## 2024-05-16 RX ORDER — SERTRALINE HYDROCHLORIDE 100 MG/1
TABLET, FILM COATED ORAL
Qty: 90 TABLET | Refills: 0 | Status: SHIPPED | OUTPATIENT
Start: 2024-05-16

## 2024-05-16 NOTE — TELEPHONE ENCOUNTER
Sertraline    Requested Prescriptions     Pending Prescriptions Disp Refills    sertraline (ZOLOFT) 100 MG tablet 45 tablet 0     Sig: TAKE 1 & 1/2 TABLETS BY MOUTH DAILY       Next appt is Visit date not found  Last appt was 12/11/2023

## 2024-05-29 ENCOUNTER — PROCEDURE VISIT (OUTPATIENT)
Dept: PODIATRY | Age: 34
End: 2024-05-29
Payer: MEDICARE

## 2024-05-29 VITALS — HEIGHT: 65 IN | BODY MASS INDEX: 41.82 KG/M2 | WEIGHT: 251 LBS

## 2024-05-29 DIAGNOSIS — R26.2 DIFFICULTY WALKING: ICD-10-CM

## 2024-05-29 DIAGNOSIS — M79.675 PAIN OF TOE OF LEFT FOOT: ICD-10-CM

## 2024-05-29 DIAGNOSIS — M79.674 PAIN OF TOE OF RIGHT FOOT: ICD-10-CM

## 2024-05-29 DIAGNOSIS — Q66.89 CLUBFOOT, CONGENITAL: ICD-10-CM

## 2024-05-29 DIAGNOSIS — L60.0 OC (ONYCHOCRYPTOSIS): Primary | ICD-10-CM

## 2024-05-29 DIAGNOSIS — D82.1 DIGEORGE SYNDROME (HCC): ICD-10-CM

## 2024-05-29 PROCEDURE — 99213 OFFICE O/P EST LOW 20 MIN: CPT | Performed by: PODIATRIST

## 2024-05-29 NOTE — PROGRESS NOTES
Patient in today for nail care. Patient does not have any complaints of pain at this time. Patient's PCP is Paulie Garibay DO date of last ov 12/11/23            Umm Campos LPN     
MCG/ACT inhaler, Inhale 2 puffs into the lungs every 6 hours as needed for Wheezing or Shortness of Breath, Disp: 2 each, Rfl: 2    clobetasol (TEMOVATE) 0.05 % cream, Apply topically 2 times daily Apply topically 2 times daily., Disp: 30 g, Rfl: 5    Ciclopirox 1 % SHAM, USE EVERY OTHER DAY AS A SHAMPOO, Disp: 120 mL, Rfl: 5    Clobetasol Propionate Emulsion 0.05 % FOAM, Apply 1 applicator topically daily as needed (psoriasis), Disp: 1 each, Rfl: 2    Fiber Select Gummies CHEW, Take 2 gummies daily as directed, Disp: 100 tablet, Rfl: 1    Multiple Vitamins-Minerals (MULTIVITAMIN WITH MINERALS) tablet, Take 1 tablet by mouth daily, Disp: 30 tablet, Rfl: 3    ammonium lactate (LAC-HYDRIN) 12 % lotion, Apply topically daily., Disp: 222 mL, Rfl: 5    ondansetron (ZOFRAN-ODT) 4 MG disintegrating tablet, Take 1 tablet by mouth every 8 hours as needed for Nausea or Vomiting, Disp: 20 tablet, Rfl: 1    cetirizine (ZYRTEC) 10 MG tablet, TAKE ONE TABLET BY MOUTH DAILY (Patient taking differently: as needed TAKE ONE TABLET BY MOUTH DAILY), Disp: 30 tablet, Rfl: 3    Guselkumab (TREMFYA SC), Inject into the skin every 8 weeks, Disp: , Rfl:     Allergies:  Allergies   Allergen Reactions    Aspirin Other (See Comments)       Vitals:    05/29/24 1013   Weight: 113.9 kg (251 lb)   Height: 1.651 m (5' 5\")       Exam:  Neurovascular status unchanged.  Ingrown nail issues stable to both feet.  Callosities noted plantar forefoot regions bilaterally.  Upon partial-thickness debridement no underlying ulcerations or any signs of infection noted bilateral foot.  Clubfoot issues noted bilaterally.  Severe antalgic gait noted upon evaluation.      Diagnostic Studies:     No results found.      Procedures:    None    Plan Per Assessment  Peng was seen today for nail problem.    Diagnoses and all orders for this visit:    OC (onychocryptosis)    Pain of toe of right foot    Pain of toe of left foot    DiGeorge syndrome (HCC)    Clubfoot,

## 2024-06-19 DIAGNOSIS — K21.9 GASTROESOPHAGEAL REFLUX DISEASE WITHOUT ESOPHAGITIS: ICD-10-CM

## 2024-06-19 DIAGNOSIS — E11.9 NEW ONSET TYPE 2 DIABETES MELLITUS (HCC): ICD-10-CM

## 2024-06-20 RX ORDER — FAMOTIDINE 20 MG/1
TABLET, FILM COATED ORAL
Qty: 60 TABLET | Refills: 0 | Status: SHIPPED | OUTPATIENT
Start: 2024-06-20

## 2024-06-20 RX ORDER — METFORMIN HYDROCHLORIDE 750 MG/1
TABLET, EXTENDED RELEASE ORAL
Qty: 30 TABLET | Refills: 0 | Status: SHIPPED | OUTPATIENT
Start: 2024-06-20

## 2024-06-20 NOTE — TELEPHONE ENCOUNTER
Last seen 12/11/2023  Next appt Visit date not found    Requested Prescriptions     Pending Prescriptions Disp Refills    metFORMIN (GLUCOPHAGE-XR) 750 MG extended release tablet [Pharmacy Med Name: metFORMIN HCl ER Oral Tablet Extended Release 24 Hour 750 MG] 30 tablet 0     Sig: TAKE ONE TABLET BY MOUTH DAILY WITH BREAKFAST. PATIENT NEEDS APPOINTMENT    famotidine (PEPCID) 20 MG tablet [Pharmacy Med Name: Famotidine Oral Tablet 20 MG] 60 tablet 0     Sig: TAKE ONE TABLET BY MOUTH TWO TIMES A DAY. PATIENT NEEDS APPOINTMENT

## 2024-07-17 DIAGNOSIS — K21.9 GASTROESOPHAGEAL REFLUX DISEASE WITHOUT ESOPHAGITIS: ICD-10-CM

## 2024-07-17 DIAGNOSIS — F41.9 ANXIETY AND DEPRESSION: ICD-10-CM

## 2024-07-17 DIAGNOSIS — F32.A ANXIETY AND DEPRESSION: ICD-10-CM

## 2024-07-17 DIAGNOSIS — E11.9 NEW ONSET TYPE 2 DIABETES MELLITUS (HCC): ICD-10-CM

## 2024-07-17 RX ORDER — METFORMIN HYDROCHLORIDE 750 MG/1
TABLET, EXTENDED RELEASE ORAL
Qty: 30 TABLET | Refills: 0 | Status: SHIPPED | OUTPATIENT
Start: 2024-07-17

## 2024-07-17 RX ORDER — SERTRALINE HYDROCHLORIDE 100 MG/1
TABLET, FILM COATED ORAL
Qty: 90 TABLET | Refills: 0 | Status: SHIPPED | OUTPATIENT
Start: 2024-07-17

## 2024-07-17 RX ORDER — FAMOTIDINE 20 MG/1
TABLET, FILM COATED ORAL
Qty: 60 TABLET | Refills: 0 | Status: SHIPPED | OUTPATIENT
Start: 2024-07-17

## 2024-08-27 ENCOUNTER — OFFICE VISIT (OUTPATIENT)
Dept: FAMILY MEDICINE CLINIC | Age: 34
End: 2024-08-27

## 2024-08-27 VITALS
SYSTOLIC BLOOD PRESSURE: 118 MMHG | DIASTOLIC BLOOD PRESSURE: 78 MMHG | TEMPERATURE: 97.6 F | HEART RATE: 95 BPM | WEIGHT: 259 LBS | HEIGHT: 65 IN | BODY MASS INDEX: 43.15 KG/M2 | OXYGEN SATURATION: 96 %

## 2024-08-27 DIAGNOSIS — E66.01 OBESITY, CLASS III, BMI 40-49.9 (MORBID OBESITY) (HCC): ICD-10-CM

## 2024-08-27 DIAGNOSIS — E55.9 VITAMIN D DEFICIENCY: ICD-10-CM

## 2024-08-27 DIAGNOSIS — R56.9 SEIZURE (HCC): ICD-10-CM

## 2024-08-27 DIAGNOSIS — D82.1 DIGEORGE SYNDROME (HCC): ICD-10-CM

## 2024-08-27 DIAGNOSIS — Q93.81 VELOCARDIOFACIAL SYNDROME: ICD-10-CM

## 2024-08-27 DIAGNOSIS — G47.33 OSA (OBSTRUCTIVE SLEEP APNEA): ICD-10-CM

## 2024-08-27 DIAGNOSIS — B36.9 FUNGAL DERMATITIS: ICD-10-CM

## 2024-08-27 DIAGNOSIS — I38 HEART VALVE PROBLEM: ICD-10-CM

## 2024-08-27 DIAGNOSIS — E20.9 HYPOPARATHYROIDISM, UNSPECIFIED HYPOPARATHYROIDISM TYPE (HCC): ICD-10-CM

## 2024-08-27 DIAGNOSIS — F33.1 MODERATE EPISODE OF RECURRENT MAJOR DEPRESSIVE DISORDER (HCC): ICD-10-CM

## 2024-08-27 DIAGNOSIS — K21.9 GASTROESOPHAGEAL REFLUX DISEASE WITHOUT ESOPHAGITIS: ICD-10-CM

## 2024-08-27 DIAGNOSIS — E83.51 HYPOCALCEMIA: ICD-10-CM

## 2024-08-27 DIAGNOSIS — M17.12 PRIMARY OSTEOARTHRITIS OF LEFT KNEE: ICD-10-CM

## 2024-08-27 DIAGNOSIS — E11.9 TYPE 2 DIABETES MELLITUS WITHOUT COMPLICATION, WITHOUT LONG-TERM CURRENT USE OF INSULIN (HCC): Primary | ICD-10-CM

## 2024-08-27 LAB — HBA1C MFR BLD: 6.3 %

## 2024-08-27 RX ORDER — CALCITRIOL 0.5 UG/1
0.5 CAPSULE, LIQUID FILLED ORAL 2 TIMES DAILY
Qty: 180 CAPSULE | Refills: 0 | Status: SHIPPED | OUTPATIENT
Start: 2024-08-27

## 2024-08-27 RX ORDER — PIOGLITAZONEHYDROCHLORIDE 15 MG/1
15 TABLET ORAL DAILY
Qty: 30 TABLET | Refills: 3 | Status: SHIPPED | OUTPATIENT
Start: 2024-08-27

## 2024-08-27 RX ORDER — FAMOTIDINE 20 MG/1
20 TABLET, FILM COATED ORAL 2 TIMES DAILY
Qty: 60 TABLET | Refills: 5 | Status: SHIPPED | OUTPATIENT
Start: 2024-08-27

## 2024-08-27 RX ORDER — RISANKIZUMAB-RZAA 150 MG/ML
INJECTION SUBCUTANEOUS
COMMUNITY
Start: 2024-07-23

## 2024-08-27 RX ORDER — IBUPROFEN 800 MG/1
TABLET, FILM COATED ORAL
Qty: 90 TABLET | Refills: 0 | Status: SHIPPED | OUTPATIENT
Start: 2024-08-27

## 2024-08-27 RX ORDER — NYSTATIN AND TRIAMCINOLONE ACETONIDE 100000; 1 [USP'U]/G; MG/G
CREAM TOPICAL
Qty: 60 G | Refills: 2 | Status: SHIPPED | OUTPATIENT
Start: 2024-08-27

## 2024-08-27 SDOH — ECONOMIC STABILITY: FOOD INSECURITY: WITHIN THE PAST 12 MONTHS, YOU WORRIED THAT YOUR FOOD WOULD RUN OUT BEFORE YOU GOT MONEY TO BUY MORE.: NEVER TRUE

## 2024-08-27 SDOH — ECONOMIC STABILITY: FOOD INSECURITY: WITHIN THE PAST 12 MONTHS, THE FOOD YOU BOUGHT JUST DIDN'T LAST AND YOU DIDN'T HAVE MONEY TO GET MORE.: NEVER TRUE

## 2024-08-27 SDOH — ECONOMIC STABILITY: INCOME INSECURITY: HOW HARD IS IT FOR YOU TO PAY FOR THE VERY BASICS LIKE FOOD, HOUSING, MEDICAL CARE, AND HEATING?: NOT HARD AT ALL

## 2024-08-27 ASSESSMENT — ENCOUNTER SYMPTOMS
NAUSEA: 0
CONSTIPATION: 0
DIARRHEA: 1
WHEEZING: 0
VOMITING: 0
SHORTNESS OF BREATH: 1
COUGH: 0

## 2024-08-27 NOTE — PROGRESS NOTES
Peng Crane (:  1990) is a 34 y.o. female,Established patient, here for evaluation of the following chief complaint(s):  Diabetes (9 month f/u) and Hand Pain (Both hands hurt and shake)      Assessment & Plan   ASSESSMENT/PLAN:  1. Type 2 diabetes mellitus without complication, without long-term current use of insulin (HCC)  -     POCT glycosylated hemoglobin (Hb A1C)  -     pioglitazone (ACTOS) 15 MG tablet; Take 1 tablet by mouth daily, Disp-30 tablet, R-3Normal  Stop metformin due to side effects  Add actos    2. Gastroesophageal reflux disease without esophagitis  -     famotidine (PEPCID) 20 MG tablet; Take 1 tablet by mouth 2 times daily, Disp-60 tablet, R-5Normal  The current medical regimen is effective;  continue present plan and medications.    3. Moderate episode of recurrent major depressive disorder (HCC)  Stable, Being evaluated/managed by Renetta Kennedy NP. No acute findings today meriting change in management plan.  Recheck in 6 months    4. Hypoparathyroidism, unspecified hypoparathyroidism type ([facility])  -     Arnoldo Shearer MD, Endocrinology, [place]  -     calcitRIOL (ROCALTROL) 0.5 MCG capsule; Take 1 capsule by mouth 2 times daily, Disp-180 capsule, R-0Normal  -     calcium carbonate 1500 (600 Ca) MG TABS tablet; TAKE TWO TABLETS BY MOUTH THREE TIMES A DAY, Disp-180 tablet, R-0PATIENT NEEDS APPOINTMENTNormal  Not current with endo, new referral placed  Recheck in 6 months    5. DiGeorge syndrome (HCC)  -     Arnoldo Shearer MD, Endocrinology, [place]  Stable, referrals placed  Recheck in 6 months    6. Seizure ([facility])  Stable, Being evaluated/managed by John Estrada. No acute findings today meriting change in management plan.  Recheck in 1 year    7. Heart valve problem  -     Teresa Hernandez MD, Cardiology, [facility]  Previous patient    8. Obesity, Class III, BMI 40-49.9 (morbid obesity) ([facility])  Stable, diet and exercise discussed and  heard.  Pulmonary:      Effort: Pulmonary effort is normal. No respiratory distress.      Breath sounds: Normal breath sounds. No wheezing or rales.   Chest:      Chest wall: No tenderness.   Abdominal:      General: Bowel sounds are normal.      Palpations: Abdomen is soft.      Tenderness: There is no abdominal tenderness.   Musculoskeletal:      Right lower leg: Edema present.      Left lower leg: Edema present.   Lymphadenopathy:      Cervical: No cervical adenopathy.   Skin:     General: Skin is warm and dry.   Neurological:      Mental Status: She is alert and oriented to person, place, and time.   Psychiatric:         Mood and Affect: Mood normal.         Behavior: Behavior normal.            [facility] moderate      An electronic signature was used to authenticate this note.    --Claire Pearl, APRN - CNP

## 2024-08-28 ENCOUNTER — OFFICE VISIT (OUTPATIENT)
Dept: PODIATRY | Age: 34
End: 2024-08-28
Payer: MEDICARE

## 2024-08-28 VITALS — HEIGHT: 65 IN | BODY MASS INDEX: 43.15 KG/M2 | WEIGHT: 259 LBS

## 2024-08-28 DIAGNOSIS — R26.2 DIFFICULTY WALKING: ICD-10-CM

## 2024-08-28 DIAGNOSIS — M79.674 PAIN OF TOE OF RIGHT FOOT: ICD-10-CM

## 2024-08-28 DIAGNOSIS — D82.1 DIGEORGE SYNDROME (HCC): ICD-10-CM

## 2024-08-28 DIAGNOSIS — L60.0 OC (ONYCHOCRYPTOSIS): Primary | ICD-10-CM

## 2024-08-28 DIAGNOSIS — M79.675 PAIN OF TOE OF LEFT FOOT: ICD-10-CM

## 2024-08-28 DIAGNOSIS — Q66.89 CLUBFOOT, CONGENITAL: ICD-10-CM

## 2024-08-28 PROCEDURE — 99213 OFFICE O/P EST LOW 20 MIN: CPT | Performed by: PODIATRIST

## 2024-08-28 NOTE — PROGRESS NOTES
24     Peng Crane    : 1990   Sex: female    Age: 34 y.o.    Patient's PCP/Provider is:  Paulie Garibay DO    Subjective:  Patient is seen today for follow-up regarding continued care regarding nail dystrophy issues, ingrown issues to both feet.  Patient denies any additional issues at this time.  According to her mother she has been wearing her good supportive shoe gear and bracing as instructed.    Chief Complaint   Patient presents with    Nail Problem     Nail care       ROS:  Const: Positives and pertinent negatives as per HPI.    Musculo: Denies symptoms other than stated above.  Neuro: Denies symptoms other than stated above.  Skin: Denies symptoms other than stated above.    Current Medications:    Current Outpatient Medications:     SKYRIZI 150 MG/ML MELBA, , Disp: , Rfl:     famotidine (PEPCID) 20 MG tablet, Take 1 tablet by mouth 2 times daily, Disp: 60 tablet, Rfl: 5    pioglitazone (ACTOS) 15 MG tablet, Take 1 tablet by mouth daily, Disp: 30 tablet, Rfl: 3    calcitRIOL (ROCALTROL) 0.5 MCG capsule, Take 1 capsule by mouth 2 times daily, Disp: 180 capsule, Rfl: 0    calcium carbonate 1500 (600 Ca) MG TABS tablet, TAKE TWO TABLETS BY MOUTH THREE TIMES A DAY, Disp: 180 tablet, Rfl: 0    ibuprofen (IBU) 800 MG tablet, TAKE ONE TABLET BY MOUTH EVERY 8 HOURS AS NEEDED FOR PAIN, Disp: 90 tablet, Rfl: 0    nystatin-triamcinolone (MYCOLOG II) 467338-5.1 UNIT/GM-% cream, Apply topically 2 times daily., Disp: 60 g, Rfl: 2    vitamin D3 (NATURAL VITAMIN D-3) 125 MCG (5000 UT) TABS tablet, Take one tablet by mouth daily, Disp: 30 tablet, Rfl: 4    sertraline (ZOLOFT) 100 MG tablet, TAKE 1 & 1/2 TABLET BY MOUTH DAILY (Patient taking differently: 2 tablets TAKE 2 tabs daily), Disp: 90 tablet, Rfl: 0    levETIRAcetam (KEPPRA) 750 MG tablet, Take 1 tablet by mouth 2 times daily, Disp: 60 tablet, Rfl: 11    albuterol sulfate HFA (PROVENTIL;VENTOLIN;PROAIR) 108 (90 Base) MCG/ACT inhaler, Inhale 2  will be followed up at a later date for continued Podiatric foot evaluation and management.      Seen By:    Beka Petit Jr, DPM    Electronically signed by Beka Petit Jr, DPM on 8/28/2024 at 11:32 AM    This note was created using voice recognition software.  The note was reviewed however may contain grammatical errors.

## 2024-08-28 NOTE — PROGRESS NOTES
Patient in today for nail care. Patient does not have any complaints of pain at this time. Patient's PCP is Paulie Garibay DO date of last ov 8/27/24          Umm Campos LPN

## 2024-09-12 ENCOUNTER — TELEPHONE (OUTPATIENT)
Dept: FAMILY MEDICINE CLINIC | Age: 34
End: 2024-09-12

## 2024-10-06 DIAGNOSIS — F32.A ANXIETY AND DEPRESSION: ICD-10-CM

## 2024-10-06 DIAGNOSIS — F41.9 ANXIETY AND DEPRESSION: ICD-10-CM

## 2024-10-07 RX ORDER — SERTRALINE HYDROCHLORIDE 100 MG/1
TABLET, FILM COATED ORAL
Qty: 90 TABLET | Refills: 0 | Status: SHIPPED | OUTPATIENT
Start: 2024-10-07

## 2024-10-30 ENCOUNTER — OFFICE VISIT (OUTPATIENT)
Dept: PODIATRY | Age: 34
End: 2024-10-30
Payer: MEDICARE

## 2024-10-30 VITALS — HEIGHT: 65 IN | WEIGHT: 259 LBS | BODY MASS INDEX: 43.15 KG/M2

## 2024-10-30 DIAGNOSIS — Q66.89 CLUBFOOT, CONGENITAL: ICD-10-CM

## 2024-10-30 DIAGNOSIS — R56.9 SEIZURE (HCC): ICD-10-CM

## 2024-10-30 DIAGNOSIS — D82.1 DIGEORGE SYNDROME (HCC): ICD-10-CM

## 2024-10-30 DIAGNOSIS — M79.675 PAIN OF TOE OF LEFT FOOT: ICD-10-CM

## 2024-10-30 DIAGNOSIS — L60.0 OC (ONYCHOCRYPTOSIS): Primary | ICD-10-CM

## 2024-10-30 DIAGNOSIS — R26.2 DIFFICULTY WALKING: ICD-10-CM

## 2024-10-30 DIAGNOSIS — M79.674 PAIN OF TOE OF RIGHT FOOT: ICD-10-CM

## 2024-10-30 PROCEDURE — 1036F TOBACCO NON-USER: CPT | Performed by: PODIATRIST

## 2024-10-30 PROCEDURE — 99213 OFFICE O/P EST LOW 20 MIN: CPT | Performed by: PODIATRIST

## 2024-10-30 PROCEDURE — G8417 CALC BMI ABV UP PARAM F/U: HCPCS | Performed by: PODIATRIST

## 2024-10-30 PROCEDURE — G8484 FLU IMMUNIZE NO ADMIN: HCPCS | Performed by: PODIATRIST

## 2024-10-30 PROCEDURE — G8427 DOCREV CUR MEDS BY ELIG CLIN: HCPCS | Performed by: PODIATRIST

## 2024-10-30 NOTE — PROGRESS NOTES
Patient is in today for routine nail care and pain in both feet. Patient has been unable to wear her previous braces and says she has a burning sensation in both feet. Pcp is Paulie Garibay,   Last ov 8/27/24

## 2024-10-30 NOTE — PROGRESS NOTES
10/30/24     Peng Crane    : 1990   Sex: female    Age: 34 y.o.    Patient's PCP/Provider is:  Paulie Garibay DO    Subjective:  Patient is seen today for follow-up regarding continued care regarding painful nail dystrophy issues to both feet.  Patient still having issues with her clubfoot issues and hammertoe issues bilateral foot.  Patient and her mother would like to get radiographs ordered for review.  She denies any recent changes in activities or any injuries.  No other additional abnormalities noted at this time.    Chief Complaint   Patient presents with    Nail Problem     Nail care    Foot Pain     Bilateral foot        ROS:  Const: Positives and pertinent negatives as per HPI.    Musculo: Denies symptoms other than stated above.  Neuro: Denies symptoms other than stated above.  Skin: Denies symptoms other than stated above.    Current Medications:    Current Outpatient Medications:     sertraline (ZOLOFT) 100 MG tablet, TAKE 1 & 1/2 TABLETS BY MOUTH DAILY, Disp: 90 tablet, Rfl: 0    SKYRIZI 150 MG/ML MELBA, , Disp: , Rfl:     famotidine (PEPCID) 20 MG tablet, Take 1 tablet by mouth 2 times daily, Disp: 60 tablet, Rfl: 5    pioglitazone (ACTOS) 15 MG tablet, Take 1 tablet by mouth daily, Disp: 30 tablet, Rfl: 3    calcitRIOL (ROCALTROL) 0.5 MCG capsule, Take 1 capsule by mouth 2 times daily, Disp: 180 capsule, Rfl: 0    calcium carbonate 1500 (600 Ca) MG TABS tablet, TAKE TWO TABLETS BY MOUTH THREE TIMES A DAY, Disp: 180 tablet, Rfl: 0    ibuprofen (IBU) 800 MG tablet, TAKE ONE TABLET BY MOUTH EVERY 8 HOURS AS NEEDED FOR PAIN, Disp: 90 tablet, Rfl: 0    nystatin-triamcinolone (MYCOLOG II) 697598-1.1 UNIT/GM-% cream, Apply topically 2 times daily., Disp: 60 g, Rfl: 2    vitamin D3 (NATURAL VITAMIN D-3) 125 MCG (5000 UT) TABS tablet, Take one tablet by mouth daily, Disp: 30 tablet, Rfl: 4    levETIRAcetam (KEPPRA) 750 MG tablet, Take 1 tablet by mouth 2 times daily, Disp: 60 tablet, Rfl:

## 2024-11-19 ENCOUNTER — OFFICE VISIT (OUTPATIENT)
Dept: CARDIOLOGY CLINIC | Age: 34
End: 2024-11-19
Payer: MEDICARE

## 2024-11-19 VITALS
WEIGHT: 262 LBS | RESPIRATION RATE: 18 BRPM | HEART RATE: 89 BPM | HEIGHT: 65 IN | DIASTOLIC BLOOD PRESSURE: 80 MMHG | SYSTOLIC BLOOD PRESSURE: 132 MMHG | BODY MASS INDEX: 43.65 KG/M2

## 2024-11-19 DIAGNOSIS — R06.02 SHORTNESS OF BREATH: ICD-10-CM

## 2024-11-19 DIAGNOSIS — R07.9 CHEST PAIN, UNSPECIFIED TYPE: ICD-10-CM

## 2024-11-19 DIAGNOSIS — I87.2 VENOUS INSUFFICIENCY (CHRONIC) (PERIPHERAL): Primary | ICD-10-CM

## 2024-11-19 PROCEDURE — G8427 DOCREV CUR MEDS BY ELIG CLIN: HCPCS | Performed by: INTERNAL MEDICINE

## 2024-11-19 PROCEDURE — G8417 CALC BMI ABV UP PARAM F/U: HCPCS | Performed by: INTERNAL MEDICINE

## 2024-11-19 PROCEDURE — 99214 OFFICE O/P EST MOD 30 MIN: CPT | Performed by: INTERNAL MEDICINE

## 2024-11-19 PROCEDURE — G8484 FLU IMMUNIZE NO ADMIN: HCPCS | Performed by: INTERNAL MEDICINE

## 2024-11-19 PROCEDURE — 1036F TOBACCO NON-USER: CPT | Performed by: INTERNAL MEDICINE

## 2024-11-19 PROCEDURE — 93000 ELECTROCARDIOGRAM COMPLETE: CPT | Performed by: INTERNAL MEDICINE

## 2024-11-19 NOTE — PROGRESS NOTES
[facility] Cardiology Progress Note  Dr. Teresa Rdz      Referring Physician: Paulie Garibay DO  CHIEF COMPLAINT:   Chief Complaint   Patient presents with    Follow-up    Shortness of Breath    Chest Pain       HISTORY OF PRESENT ILLNESS:   Patient is 34 year old female with history of DiGeorge syndrome, surgical repair of interrupted aortic arch and closure of ventricular septal defect, here for follow up.  Sharp chest pain that comes and goes, lasts for less than a minute, shortness of breath is at baseline, denies any lightheadedness or dizziness no palpitations, no pedal edema, no PND, no orthopnea, no syncope, no presyncopal episodes.  Functional is at baseline capacity.  Still very sedentary    Past Medical History:   Diagnosis Date    Development delay     DiGeorge syndrome (HCC)     Obesity     Velocardiofacial syndrome          Past Surgical History:   Procedure Laterality Date    CARDIAC SURGERY      CHOLECYSTECTOMY, LAPAROSCOPIC N/A 8/6/2020    CHOLECYSTECTOMY LAPAROSCOPIC WITH INTRAOPERATIVE CHOLANGIOGRAM performed by Albaor Noyola MD at [facility] OR         Current Outpatient Medications   Medication Sig Dispense Refill    sertraline (ZOLOFT) 100 MG tablet TAKE 1 & 1/2 TABLETS BY MOUTH DAILY 90 tablet 0    SKYRIZI 150 MG/ML SOSY       famotidine (PEPCID) 20 MG tablet Take 1 tablet by mouth 2 times daily 60 tablet 5    pioglitazone (ACTOS) 15 MG tablet Take 1 tablet by mouth daily 30 tablet 3    calcitRIOL (ROCALTROL) 0.5 MCG capsule Take 1 capsule by mouth 2 times daily 180 capsule 0    calcium carbonate 1500 (600 Ca) MG TABS tablet TAKE TWO TABLETS BY MOUTH THREE TIMES A  tablet 0    nystatin-triamcinolone (MYCOLOG II) 888806-4.1 UNIT/GM-% cream Apply topically 2 times daily. 60 g 2    vitamin D3 (NATURAL VITAMIN D-3) 125 MCG (5000 UT) TABS tablet Take one tablet by mouth daily 30 tablet 4    levETIRAcetam (KEPPRA) 750 MG tablet Take 1 tablet by mouth 2 times daily 60 tablet 11

## 2024-11-29 DIAGNOSIS — E11.9 NEW ONSET TYPE 2 DIABETES MELLITUS (HCC): ICD-10-CM

## 2024-11-29 DIAGNOSIS — E11.9 TYPE 2 DIABETES MELLITUS WITHOUT COMPLICATION, WITHOUT LONG-TERM CURRENT USE OF INSULIN (HCC): ICD-10-CM

## 2024-12-02 RX ORDER — PIOGLITAZONE 15 MG/1
15 TABLET ORAL DAILY
Qty: 30 TABLET | Refills: 0 | Status: SHIPPED | OUTPATIENT
Start: 2024-12-02

## 2024-12-02 RX ORDER — METFORMIN HYDROCHLORIDE 750 MG/1
TABLET, EXTENDED RELEASE ORAL
Qty: 30 TABLET | Refills: 0 | OUTPATIENT
Start: 2024-12-02

## 2024-12-02 NOTE — TELEPHONE ENCOUNTER
Name of Medication(s) Requested:  Requested Prescriptions     Pending Prescriptions Disp Refills    pioglitazone (ACTOS) 15 MG tablet [Pharmacy Med Name: Pioglitazone HCl Oral Tablet 15 MG] 30 tablet 0     Sig: TAKE ONE TABLET BY MOUTH DAILY       Medication is on current medication list Yes    Dosage and directions were verified? Yes    Quantity verified: 30 day supply     Pharmacy Verified?  Yes    Last Appointment:  8/27/2024    Future appts:  Future Appointments   Date Time Provider Department Center   12/9/2024 12:30 PM GLORIA ROUSE ECHO 1 KRISTOPHER TORRES [facility] Rad/Car   12/11/2024  1:00 PM Paulie Garibay DO [place]  BS [facility] DEP   1/8/2025  1:15 PM Beka Petit Jr., CHARLEY CRONIN POD [facility]   3/5/2025  1:00 PM Arnoldo Silveira MD BDM ENDO [facility]        (If no appt send self scheduling link. .REFILLAPPT)  Scheduling request sent?     [] Yes  [x] No    Does patient need updated?  [] Yes  [x] No

## 2024-12-30 ENCOUNTER — OFFICE VISIT (OUTPATIENT)
Dept: FAMILY MEDICINE CLINIC | Age: 34
End: 2024-12-30

## 2024-12-30 VITALS
TEMPERATURE: 97.8 F | OXYGEN SATURATION: 99 % | SYSTOLIC BLOOD PRESSURE: 128 MMHG | RESPIRATION RATE: 18 BRPM | HEART RATE: 98 BPM | BODY MASS INDEX: 44.35 KG/M2 | HEIGHT: 65 IN | DIASTOLIC BLOOD PRESSURE: 64 MMHG | WEIGHT: 266.2 LBS

## 2024-12-30 DIAGNOSIS — D82.1 DIGEORGE SYNDROME (HCC): ICD-10-CM

## 2024-12-30 DIAGNOSIS — Z00.00 MEDICARE ANNUAL WELLNESS VISIT, SUBSEQUENT: Primary | ICD-10-CM

## 2024-12-30 DIAGNOSIS — G40.409 OTHER GENERALIZED EPILEPSY, NOT INTRACTABLE, WITHOUT STATUS EPILEPTICUS (HCC): ICD-10-CM

## 2024-12-30 DIAGNOSIS — E20.9 HYPOPARATHYROIDISM, UNSPECIFIED HYPOPARATHYROIDISM TYPE (HCC): ICD-10-CM

## 2024-12-30 DIAGNOSIS — B36.9 FUNGAL DERMATITIS: ICD-10-CM

## 2024-12-30 DIAGNOSIS — E83.51 HYPOCALCEMIA: ICD-10-CM

## 2024-12-30 DIAGNOSIS — F32.A ANXIETY AND DEPRESSION: ICD-10-CM

## 2024-12-30 DIAGNOSIS — Z23 IMMUNIZATION DUE: ICD-10-CM

## 2024-12-30 DIAGNOSIS — R13.12 OROPHARYNGEAL DYSPHAGIA: ICD-10-CM

## 2024-12-30 DIAGNOSIS — F41.9 ANXIETY AND DEPRESSION: ICD-10-CM

## 2024-12-30 DIAGNOSIS — E11.9 TYPE 2 DIABETES MELLITUS WITHOUT COMPLICATION, WITHOUT LONG-TERM CURRENT USE OF INSULIN (HCC): ICD-10-CM

## 2024-12-30 DIAGNOSIS — M17.12 PRIMARY OSTEOARTHRITIS OF LEFT KNEE: ICD-10-CM

## 2024-12-30 DIAGNOSIS — E55.9 VITAMIN D DEFICIENCY: ICD-10-CM

## 2024-12-30 RX ORDER — NYSTATIN AND TRIAMCINOLONE ACETONIDE 100000; 1 [USP'U]/G; MG/G
CREAM TOPICAL
Qty: 60 G | Refills: 2 | Status: SHIPPED | OUTPATIENT
Start: 2024-12-30

## 2024-12-30 RX ORDER — PIOGLITAZONE 15 MG/1
15 TABLET ORAL DAILY
Qty: 30 TABLET | Refills: 0 | Status: SHIPPED | OUTPATIENT
Start: 2024-12-30

## 2024-12-30 RX ORDER — IBUPROFEN 800 MG/1
TABLET, FILM COATED ORAL
Qty: 90 TABLET | Refills: 0 | Status: SHIPPED | OUTPATIENT
Start: 2024-12-30

## 2024-12-30 RX ORDER — LEVETIRACETAM 750 MG/1
750 TABLET ORAL 2 TIMES DAILY
Qty: 60 TABLET | Refills: 11 | Status: SHIPPED | OUTPATIENT
Start: 2024-12-30

## 2024-12-30 RX ORDER — CALCITRIOL 0.5 UG/1
0.5 CAPSULE, LIQUID FILLED ORAL 2 TIMES DAILY
Qty: 180 CAPSULE | Refills: 0 | Status: SHIPPED | OUTPATIENT
Start: 2024-12-30

## 2024-12-30 RX ORDER — SERTRALINE HYDROCHLORIDE 100 MG/1
200 TABLET, FILM COATED ORAL DAILY
Qty: 60 TABLET | Refills: 2 | Status: SHIPPED | OUTPATIENT
Start: 2024-12-30

## 2024-12-30 ASSESSMENT — PATIENT HEALTH QUESTIONNAIRE - PHQ9
SUM OF ALL RESPONSES TO PHQ QUESTIONS 1-9: 14
4. FEELING TIRED OR HAVING LITTLE ENERGY: SEVERAL DAYS
1. LITTLE INTEREST OR PLEASURE IN DOING THINGS: SEVERAL DAYS
SUM OF ALL RESPONSES TO PHQ QUESTIONS 1-9: 14
SUM OF ALL RESPONSES TO PHQ QUESTIONS 1-9: 14
5. POOR APPETITE OR OVEREATING: SEVERAL DAYS
3. TROUBLE FALLING OR STAYING ASLEEP: NEARLY EVERY DAY
10. IF YOU CHECKED OFF ANY PROBLEMS, HOW DIFFICULT HAVE THESE PROBLEMS MADE IT FOR YOU TO DO YOUR WORK, TAKE CARE OF THINGS AT HOME, OR GET ALONG WITH OTHER PEOPLE: NOT DIFFICULT AT ALL
SUM OF ALL RESPONSES TO PHQ9 QUESTIONS 1 & 2: 4
6. FEELING BAD ABOUT YOURSELF - OR THAT YOU ARE A FAILURE OR HAVE LET YOURSELF OR YOUR FAMILY DOWN: NEARLY EVERY DAY
2. FEELING DOWN, DEPRESSED OR HOPELESS: NEARLY EVERY DAY
7. TROUBLE CONCENTRATING ON THINGS, SUCH AS READING THE NEWSPAPER OR WATCHING TELEVISION: SEVERAL DAYS
SUM OF ALL RESPONSES TO PHQ QUESTIONS 1-9: 13
9. THOUGHTS THAT YOU WOULD BE BETTER OFF DEAD, OR OF HURTING YOURSELF: SEVERAL DAYS
8. MOVING OR SPEAKING SO SLOWLY THAT OTHER PEOPLE COULD HAVE NOTICED. OR THE OPPOSITE, BEING SO FIGETY OR RESTLESS THAT YOU HAVE BEEN MOVING AROUND A LOT MORE THAN USUAL: NOT AT ALL

## 2024-12-30 ASSESSMENT — LIFESTYLE VARIABLES
HOW OFTEN DO YOU HAVE A DRINK CONTAINING ALCOHOL: NEVER
HOW MANY STANDARD DRINKS CONTAINING ALCOHOL DO YOU HAVE ON A TYPICAL DAY: PATIENT DOES NOT DRINK

## 2024-12-30 NOTE — PROGRESS NOTES
Medicare Annual Wellness Visit    Peng Crane is here for Medicare AWV (Difficulty sleeping, patient has sleep study is scheduled /Headaches, multiple times a week /Chest pain on LT side of chest above breast/Concerns of walking/balance/Sharp pains in both hands )    Assessment & Plan   Medicare annual wellness visit, subsequent  Hypoparathyroidism, unspecified hypoparathyroidism type (HCC)  -     calcitRIOL (ROCALTROL) 0.5 MCG capsule; Take 1 capsule by mouth 2 times daily, Disp-180 capsule, R-0Normal  -     calcium carbonate 1500 (600 Ca) MG TABS tablet; TAKE TWO TABLETS BY MOUTH THREE TIMES A DAY, Disp-180 tablet, R-0Normal  Hypocalcemia  -     calcitRIOL (ROCALTROL) 0.5 MCG capsule; Take 1 capsule by mouth 2 times daily, Disp-180 capsule, R-0Normal  -     calcium carbonate 1500 (600 Ca) MG TABS tablet; TAKE TWO TABLETS BY MOUTH THREE TIMES A DAY, Disp-180 tablet, R-0Normal  Primary osteoarthritis of left knee  -     ibuprofen (IBU) 800 MG tablet; TAKE ONE TABLET BY MOUTH EVERY 8 HOURS AS NEEDED FOR PAIN, Disp-90 tablet, R-0Normal  Other generalized epilepsy, not intractable, without status epilepticus (HCC)  -     levETIRAcetam (KEPPRA) 750 MG tablet; Take 1 tablet by mouth 2 times daily, Disp-60 tablet, R-11Normal  DiGeorge syndrome (HCC)  -     levETIRAcetam (KEPPRA) 750 MG tablet; Take 1 tablet by mouth 2 times daily, Disp-60 tablet, R-11Normal  Fungal dermatitis  -     nystatin-triamcinolone (MYCOLOG II) 840895-0.1 UNIT/GM-% cream; Apply topically 2 times daily., Disp-60 g, R-2, Normal  Type 2 diabetes mellitus without complication, without long-term current use of insulin (HCC)  -     pioglitazone (ACTOS) 15 MG tablet; Take 1 tablet by mouth daily, Disp-30 tablet, R-0Normal  Anxiety and depression  -     sertraline (ZOLOFT) 100 MG tablet; Take 2 tablets by mouth daily Take 2 tablets daily, Disp-60 tablet, R-2Normal  Vitamin D deficiency  -     vitamin D3 (NATURAL VITAMIN D-3) 125 MCG (5000 UT) TABS

## 2025-01-29 ENCOUNTER — OFFICE VISIT (OUTPATIENT)
Dept: PODIATRY | Age: 35
End: 2025-01-29
Payer: MEDICARE

## 2025-01-29 VITALS — BODY MASS INDEX: 44.32 KG/M2 | WEIGHT: 266 LBS | HEIGHT: 65 IN

## 2025-01-29 DIAGNOSIS — M79.674 PAIN OF TOE OF RIGHT FOOT: ICD-10-CM

## 2025-01-29 DIAGNOSIS — Q66.89 CLUBFOOT, CONGENITAL: ICD-10-CM

## 2025-01-29 DIAGNOSIS — R26.2 DIFFICULTY WALKING: ICD-10-CM

## 2025-01-29 DIAGNOSIS — L60.0 OC (ONYCHOCRYPTOSIS): Primary | ICD-10-CM

## 2025-01-29 DIAGNOSIS — D82.1 DIGEORGE SYNDROME (HCC): ICD-10-CM

## 2025-01-29 DIAGNOSIS — M79.675 PAIN OF TOE OF LEFT FOOT: ICD-10-CM

## 2025-01-29 PROCEDURE — G8417 CALC BMI ABV UP PARAM F/U: HCPCS | Performed by: PODIATRIST

## 2025-01-29 PROCEDURE — 1036F TOBACCO NON-USER: CPT | Performed by: PODIATRIST

## 2025-01-29 PROCEDURE — 99213 OFFICE O/P EST LOW 20 MIN: CPT | Performed by: PODIATRIST

## 2025-01-29 PROCEDURE — G8427 DOCREV CUR MEDS BY ELIG CLIN: HCPCS | Performed by: PODIATRIST

## 2025-01-29 NOTE — PROGRESS NOTES
25     Peng Crane    : 1990   Sex: female    Age: 35 y.o.    Patient's PCP/Provider is:  Paulie Garibay DO    Subjective:  Patient is seen today for follow-up regarding continued care regarding chronic ingrown nail issues to both lower extremities.  Patient is having continued symptoms with daily ambulatory activities.  Patient is wearing her bracing as instructed.  No other additional abnormalities noted at this time.    Chief Complaint   Patient presents with    Nail Problem     Nail care       ROS:  Const: Positives and pertinent negatives as per HPI.    Musculo: Denies symptoms other than stated above.  Neuro: Denies symptoms other than stated above.  Skin: Denies symptoms other than stated above.    Current Medications:    Current Outpatient Medications:     calcitRIOL (ROCALTROL) 0.5 MCG capsule, Take 1 capsule by mouth 2 times daily, Disp: 180 capsule, Rfl: 0    calcium carbonate 1500 (600 Ca) MG TABS tablet, TAKE TWO TABLETS BY MOUTH THREE TIMES A DAY, Disp: 180 tablet, Rfl: 0    ibuprofen (IBU) 800 MG tablet, TAKE ONE TABLET BY MOUTH EVERY 8 HOURS AS NEEDED FOR PAIN, Disp: 90 tablet, Rfl: 0    levETIRAcetam (KEPPRA) 750 MG tablet, Take 1 tablet by mouth 2 times daily, Disp: 60 tablet, Rfl: 11    nystatin-triamcinolone (MYCOLOG II) 461190-2.1 UNIT/GM-% cream, Apply topically 2 times daily., Disp: 60 g, Rfl: 2    pioglitazone (ACTOS) 15 MG tablet, Take 1 tablet by mouth daily, Disp: 30 tablet, Rfl: 0    sertraline (ZOLOFT) 100 MG tablet, Take 2 tablets by mouth daily Take 2 tablets daily, Disp: 60 tablet, Rfl: 2    vitamin D3 (NATURAL VITAMIN D-3) 125 MCG (5000 UT) TABS tablet, Take one tablet by mouth daily, Disp: 30 tablet, Rfl: 4    SKYRIZI 150 MG/ML SOSY, , Disp: , Rfl:     famotidine (PEPCID) 20 MG tablet, Take 1 tablet by mouth 2 times daily, Disp: 60 tablet, Rfl: 5    clobetasol (TEMOVATE) 0.05 % cream, Apply topically 2 times daily Apply topically 2 times daily., Disp: 30 g,

## 2025-01-29 NOTE — PROGRESS NOTES
Patient in today for nail care. Patient does not have any complaints of pain at this time. Patient's PCP is Paulie Garibay DO date of last ov 12/30/24          Umm Campos LPN

## 2025-02-25 DIAGNOSIS — M79.671 RIGHT FOOT PAIN: ICD-10-CM

## 2025-02-25 DIAGNOSIS — M79.672 LEFT FOOT PAIN: Primary | ICD-10-CM

## 2025-02-28 DIAGNOSIS — K21.9 GASTROESOPHAGEAL REFLUX DISEASE WITHOUT ESOPHAGITIS: ICD-10-CM

## 2025-02-28 RX ORDER — FAMOTIDINE 20 MG/1
20 TABLET, FILM COATED ORAL 2 TIMES DAILY
Qty: 60 TABLET | Refills: 0 | Status: SHIPPED | OUTPATIENT
Start: 2025-02-28

## 2025-02-28 NOTE — TELEPHONE ENCOUNTER
Name of Medication(s) Requested:  Requested Prescriptions     Pending Prescriptions Disp Refills    famotidine (PEPCID) 20 MG tablet [Pharmacy Med Name: Famotidine Oral Tablet 20 MG] 60 tablet 0     Sig: TAKE ONE TABLET BY MOUTH TWO TIMES A DAY       Medication is on current medication list Yes    Dosage and directions were verified? Yes    Quantity verified: 30 day supply     Pharmacy Verified?  Yes    Last Appointment:  8/27/2024    Future appts:  Future Appointments   Date Time Provider Department Center   3/5/2025  1:00 PM Arnoldo Silveira MD BDCOR[facility] ENDO [facility]   4/2/2025  2:00 PM Beka Petit Jr., DPCORRIE CRONIN POD [facility]        (If no appt send self scheduling link. .REFILLAPPT)  Scheduling request sent?     [] Yes  [x] No    Does patient need updated?  [] Yes  [x] No  
Transportation service

## 2025-04-09 DIAGNOSIS — E83.51 HYPOCALCEMIA: ICD-10-CM

## 2025-04-09 DIAGNOSIS — E20.9 HYPOPARATHYROIDISM, UNSPECIFIED HYPOPARATHYROIDISM TYPE: ICD-10-CM

## 2025-04-09 DIAGNOSIS — E11.9 TYPE 2 DIABETES MELLITUS WITHOUT COMPLICATION, WITHOUT LONG-TERM CURRENT USE OF INSULIN: ICD-10-CM

## 2025-04-10 RX ORDER — PIOGLITAZONE 15 MG/1
15 TABLET ORAL DAILY
Qty: 30 TABLET | Refills: 0 | Status: SHIPPED | OUTPATIENT
Start: 2025-04-10

## 2025-04-10 RX ORDER — CALCITRIOL 0.5 UG/1
0.5 CAPSULE, LIQUID FILLED ORAL 2 TIMES DAILY
Qty: 180 CAPSULE | Refills: 0 | Status: SHIPPED | OUTPATIENT
Start: 2025-04-10

## 2025-04-10 NOTE — TELEPHONE ENCOUNTER
Name of Medication(s) Requested:  Requested Prescriptions     Pending Prescriptions Disp Refills    calcitRIOL (ROCALTROL) 0.5 MCG capsule [Pharmacy Med Name: Calcitriol Oral Capsule 0.5 MCG] 180 capsule 0     Sig: TAKE ONE CAPSULE BY MOUTH TWO TIMES A DAY    pioglitazone (ACTOS) 15 MG tablet [Pharmacy Med Name: Pioglitazone HCl Oral Tablet 15 MG] 30 tablet 0     Sig: TAKE ONE TABLET BY MOUTH DAILY       Medication is on current medication list Yes    Dosage and directions were verified? Yes    Quantity verified: 90 day supply     Pharmacy Verified?  Yes    Last Appointment:  12/30/2024    Future appts:  Future Appointments   Date Time Provider Department Center   4/10/2025  3:30 PM Beka Petit Jr., DPCORRIE LACY [facility]        (If no appt send self scheduling link. .REFILLAPPT)  Scheduling request sent?     [] Yes  [] No    Does patient need updated?  [] Yes  [] No

## 2025-05-13 ENCOUNTER — PROCEDURE VISIT (OUTPATIENT)
Dept: PODIATRY | Age: 35
End: 2025-05-13

## 2025-05-13 VITALS — WEIGHT: 266 LBS | BODY MASS INDEX: 44.32 KG/M2 | HEIGHT: 65 IN

## 2025-05-13 DIAGNOSIS — L84 CORNS AND CALLUS: ICD-10-CM

## 2025-05-13 DIAGNOSIS — M79.675 PAIN OF TOE OF LEFT FOOT: ICD-10-CM

## 2025-05-13 DIAGNOSIS — D82.1 DIGEORGE SYNDROME (HCC): ICD-10-CM

## 2025-05-13 DIAGNOSIS — M79.674 PAIN OF TOE OF RIGHT FOOT: ICD-10-CM

## 2025-05-13 DIAGNOSIS — Q66.89 CLUBFOOT, CONGENITAL: ICD-10-CM

## 2025-05-13 DIAGNOSIS — L60.0 OC (ONYCHOCRYPTOSIS): Primary | ICD-10-CM

## 2025-05-13 DIAGNOSIS — R26.2 DIFFICULTY WALKING: ICD-10-CM

## 2025-05-13 NOTE — PROGRESS NOTES
Patient in today for nail care. Patient does not have any complaints of pain at this time. Patient's PCP is Paulie Garibay DO date of last ov 12/30/24          Umm Campos LPN     
debridement corn/callosities performed plantar left 3rd and 5th MTPJ regions to tolerance.  Patient was dispensed some moleskin padding to be utilized in the interim as well.  Patient is to continue wearing her good supportive shoe gear and bracing as discussed.  Patient will be followed up for regular scheduled footcare appointment or sooner if needed.      Seen By:    Beka Petit Jr, DPM    Electronically signed by Beka Petit Jr, DPM on 5/13/2025 at 3:22 PM    This note was created using voice recognition software.  The note was reviewed however may contain grammatical errors.

## 2025-05-22 DIAGNOSIS — F32.A ANXIETY AND DEPRESSION: ICD-10-CM

## 2025-05-22 DIAGNOSIS — F41.9 ANXIETY AND DEPRESSION: ICD-10-CM

## 2025-05-22 RX ORDER — SERTRALINE HYDROCHLORIDE 100 MG/1
200 TABLET, FILM COATED ORAL DAILY
Qty: 60 TABLET | Refills: 0 | Status: SHIPPED | OUTPATIENT
Start: 2025-05-22

## 2025-05-22 NOTE — TELEPHONE ENCOUNTER
Name of Medication(s) Requested:  Requested Prescriptions     Pending Prescriptions Disp Refills    sertraline (ZOLOFT) 100 MG tablet [Pharmacy Med Name: Sertraline HCl Oral Tablet 100 MG] 60 tablet 0     Sig: TAKE TWO TABLETS BY MOUTH EVERY DAY       Medication is on current medication list Yes    Dosage and directions were verified? Yes    Quantity verified: 30 day supply     Pharmacy Verified?  Yes    Last Appointment:  12/30/2024    Future appts:  Future Appointments   Date Time Provider Department Center   7/23/2025  3:00 PM Beka Petit Jr., CHARLEY LACY [facility]        (If no appt send self scheduling link. .REFILLAPPT)  Scheduling request sent?     [] Yes  [] No    Does patient need updated?  [] Yes  [] No

## 2025-06-13 DIAGNOSIS — E11.9 TYPE 2 DIABETES MELLITUS WITHOUT COMPLICATION, WITHOUT LONG-TERM CURRENT USE OF INSULIN (HCC): ICD-10-CM

## 2025-06-16 RX ORDER — PIOGLITAZONE 15 MG/1
15 TABLET ORAL DAILY
Qty: 15 TABLET | Refills: 0 | Status: SHIPPED | OUTPATIENT
Start: 2025-06-16 | End: 2025-06-18 | Stop reason: SDUPTHER

## 2025-06-16 NOTE — TELEPHONE ENCOUNTER
Name of Medication(s) Requested:  Requested Prescriptions     Pending Prescriptions Disp Refills    pioglitazone (ACTOS) 15 MG tablet [Pharmacy Med Name: Pioglitazone HCl Oral Tablet 15 MG] 15 tablet 0     Sig: TAKE ONE TABLET BY MOUTH DAILY       Medication is on current medication list Yes    Dosage and directions were verified? Yes    Quantity verified: 15 day supply     Pharmacy Verified?  Yes    Last Appointment:  12/30/2024    Future appts:  Future Appointments   Date Time Provider Department Center   6/18/2025  3:00 PM Claire Pearl, APRN - CNP [facility]   7/23/2025  3:00 PM Beka Petit Jr., DPM [facility]        (If no appt send self scheduling link. .REFILLAPPT)  Scheduling request sent?     [] Yes  [x] No    Does patient need updated?  [] Yes  [x] No

## 2025-06-18 ENCOUNTER — OFFICE VISIT (OUTPATIENT)
Dept: FAMILY MEDICINE CLINIC | Age: 35
End: 2025-06-18

## 2025-06-18 VITALS
RESPIRATION RATE: 16 BRPM | HEART RATE: 95 BPM | BODY MASS INDEX: 44.92 KG/M2 | WEIGHT: 269.6 LBS | OXYGEN SATURATION: 96 % | SYSTOLIC BLOOD PRESSURE: 120 MMHG | DIASTOLIC BLOOD PRESSURE: 68 MMHG | TEMPERATURE: 98.2 F | HEIGHT: 65 IN

## 2025-06-18 DIAGNOSIS — E20.9 HYPOPARATHYROIDISM, UNSPECIFIED HYPOPARATHYROIDISM TYPE: ICD-10-CM

## 2025-06-18 DIAGNOSIS — M17.12 PRIMARY OSTEOARTHRITIS OF LEFT KNEE: ICD-10-CM

## 2025-06-18 DIAGNOSIS — J45.30 MILD PERSISTENT ASTHMA WITHOUT COMPLICATION: ICD-10-CM

## 2025-06-18 DIAGNOSIS — E11.9 TYPE 2 DIABETES MELLITUS WITHOUT COMPLICATION, WITHOUT LONG-TERM CURRENT USE OF INSULIN (HCC): ICD-10-CM

## 2025-06-18 DIAGNOSIS — R26.2 DIFFICULTY WALKING: Primary | ICD-10-CM

## 2025-06-18 DIAGNOSIS — D82.1 DIGEORGE SYNDROME (HCC): ICD-10-CM

## 2025-06-18 DIAGNOSIS — G40.409 OTHER GENERALIZED EPILEPSY, NOT INTRACTABLE, WITHOUT STATUS EPILEPTICUS (HCC): ICD-10-CM

## 2025-06-18 DIAGNOSIS — F32.A ANXIETY AND DEPRESSION: ICD-10-CM

## 2025-06-18 DIAGNOSIS — M25.372 INSTABILITY OF LEFT ANKLE JOINT: ICD-10-CM

## 2025-06-18 DIAGNOSIS — E55.9 VITAMIN D DEFICIENCY: ICD-10-CM

## 2025-06-18 DIAGNOSIS — F41.9 ANXIETY AND DEPRESSION: ICD-10-CM

## 2025-06-18 DIAGNOSIS — F33.1 MODERATE EPISODE OF RECURRENT MAJOR DEPRESSIVE DISORDER (HCC): ICD-10-CM

## 2025-06-18 DIAGNOSIS — Q66.89 CLUBFOOT, CONGENITAL: ICD-10-CM

## 2025-06-18 DIAGNOSIS — E83.51 HYPOCALCEMIA: ICD-10-CM

## 2025-06-18 LAB — HBA1C MFR BLD: 5.9 %

## 2025-06-18 RX ORDER — PIOGLITAZONE 15 MG/1
15 TABLET ORAL DAILY
Qty: 30 TABLET | Refills: 3 | Status: SHIPPED | OUTPATIENT
Start: 2025-06-18

## 2025-06-18 RX ORDER — ALBUTEROL SULFATE 90 UG/1
2 INHALANT RESPIRATORY (INHALATION) EVERY 6 HOURS PRN
Qty: 2 EACH | Refills: 2 | Status: SHIPPED | OUTPATIENT
Start: 2025-06-18

## 2025-06-18 RX ORDER — SERTRALINE HYDROCHLORIDE 100 MG/1
200 TABLET, FILM COATED ORAL DAILY
Qty: 60 TABLET | Refills: 3 | Status: SHIPPED | OUTPATIENT
Start: 2025-06-18

## 2025-06-18 SDOH — ECONOMIC STABILITY: FOOD INSECURITY: WITHIN THE PAST 12 MONTHS, YOU WORRIED THAT YOUR FOOD WOULD RUN OUT BEFORE YOU GOT MONEY TO BUY MORE.: SOMETIMES TRUE

## 2025-06-18 SDOH — ECONOMIC STABILITY: FOOD INSECURITY: WITHIN THE PAST 12 MONTHS, THE FOOD YOU BOUGHT JUST DIDN'T LAST AND YOU DIDN'T HAVE MONEY TO GET MORE.: NEVER TRUE

## 2025-06-18 ASSESSMENT — PATIENT HEALTH QUESTIONNAIRE - PHQ9
SUM OF ALL RESPONSES TO PHQ QUESTIONS 1-9: 2
SUM OF ALL RESPONSES TO PHQ QUESTIONS 1-9: 2
2. FEELING DOWN, DEPRESSED OR HOPELESS: SEVERAL DAYS
SUM OF ALL RESPONSES TO PHQ QUESTIONS 1-9: 2
SUM OF ALL RESPONSES TO PHQ QUESTIONS 1-9: 2
1. LITTLE INTEREST OR PLEASURE IN DOING THINGS: SEVERAL DAYS

## 2025-06-18 ASSESSMENT — ENCOUNTER SYMPTOMS
VOMITING: 0
CONSTIPATION: 0
DIARRHEA: 1
COUGH: 0
SHORTNESS OF BREATH: 1
WHEEZING: 1
NAUSEA: 1

## 2025-06-18 NOTE — PROGRESS NOTES
persist.    10. Lightheadedness.  - Reports of lightheadedness.  - Further evaluation may be necessary if symptoms persist.    11. Difficulty sleeping.  - Reports difficulty sleeping and experiencing night terrors.  - Advised to maintain a regular sleep schedule and consider stretching before bed.    12. Medication management.  - Currently taking multiple medications including albuterol inhaler, Lac-Hydrin lotion, calcitriol, calcium carbonate, Cyclopar shampoo, clobetasol cream, famotidine, fiber gummies, ibuprofen, Keppra, multivitamins, ondansetron, pioglitazone 15 mg, sertraline 200 mg (2 pills), Skyrizi, and natural vitamins.  - Refills for albuterol inhaler and amlodipine provided.    Follow-up  - Scheduled for a follow-up visit in 3 months for a well visit.       1. Difficulty walking  -     DME Order for Wheel Chair as OP  2. Hypoparathyroidism, unspecified hypoparathyroidism type  -     calcium carbonate 1500 (600 Ca) MG TABS tablet; TAKE TWO TABLETS BY MOUTH THREE TIMES A DAY, Disp-180 tablet, R-3Normal  3. Hypocalcemia  -     calcium carbonate 1500 (600 Ca) MG TABS tablet; TAKE TWO TABLETS BY MOUTH THREE TIMES A DAY, Disp-180 tablet, R-3Normal  4. Vitamin D deficiency  -     vitamin D3 (NATURAL VITAMIN D-3) 125 MCG (5000 UT) TABS tablet; Take one tablet by mouth daily, Disp-30 tablet, R-4Normal  5. Anxiety and depression  -     sertraline (ZOLOFT) 100 MG tablet; Take 2 tablets by mouth daily, Disp-60 tablet, R-3Normal  6. Type 2 diabetes mellitus without complication, without long-term current use of insulin (HCC)  -     pioglitazone (ACTOS) 15 MG tablet; Take 1 tablet by mouth daily, Disp-30 tablet, R-3Normal  -     POCT glycosylated hemoglobin (Hb A1C)  7. Mild persistent asthma without complication  -     albuterol sulfate HFA (PROVENTIL;VENTOLIN;PROAIR) 108 (90 Base) MCG/ACT inhaler; Inhale 2 puffs into the lungs every 6 hours as needed for Wheezing or Shortness of Breath, Disp-2 each, R-2Patient

## 2025-06-18 NOTE — PATIENT INSTRUCTIONS
[place] [employer] - Financial Resources*  (Call [employer]/211 if need more resources.)       Utility:  [employer]  What they offer: Limited assistance to restore/ prevent utility disconnection.  Phone Number: [phone]  Address: [facility] [place] [address]  Website: ShareDesk  [facility] Action Program  Utility assistance  [phone]  [facility] Community Action Partnership  Utility assistance   [phone]  Community Action Agency of [facility]  Utility assistance  886.132.6298  Financial or Medical  HELP NETWORK OF [facility]:  What they do: Provides 24-hr, 7 days a week access to information on community resources for financial help. [facility] AND [facility]  Phone: 211 or [phone]            [facility] JOB AND FAMILY SERVICES:  MAIN [facility] LINE FOR ALL [facility]: [phone]  What they do: [place] works first with temporary cash assistance if there are children in household.   [facility] DJFS: [address] [place] [address]  Phone: [phone], [phone]  [facility] DJFS: [address] [place] [address]  Phone: [phone]  [facility] DJFS: [address] [place] [address]  Phone: [phone]  Website: s.[place].[facility]    [employer] Financial Assistance  What they offer: Assistance with [employer] bills  Phone: [phone], option #5   Medications:  [employer]  What they offer: [employer] tracks prescription drug prices and provides free drug coupons for discounts on medications.  Website: https://www.[employer]  [employer]  What they offer: [employer] offers free information on medications and healthcare cost savings programs including prescription assistance programs, coupons, and discount programs.  Helpl[employer]: 999.381.2161  Website: https://www.needKnexxLocaleds.org    [employer]  What they offer: Information about free and low-cost medicine programs.  Website:

## 2025-07-23 ENCOUNTER — PROCEDURE VISIT (OUTPATIENT)
Dept: PODIATRY | Age: 35
End: 2025-07-23
Payer: MEDICARE

## 2025-07-23 VITALS — BODY MASS INDEX: 44.82 KG/M2 | HEIGHT: 65 IN | WEIGHT: 269 LBS

## 2025-07-23 DIAGNOSIS — R56.9 SEIZURE (HCC): ICD-10-CM

## 2025-07-23 DIAGNOSIS — D82.1 DIGEORGE SYNDROME (HCC): ICD-10-CM

## 2025-07-23 DIAGNOSIS — R26.2 DIFFICULTY WALKING: ICD-10-CM

## 2025-07-23 DIAGNOSIS — M79.674 PAIN OF TOE OF RIGHT FOOT: ICD-10-CM

## 2025-07-23 DIAGNOSIS — Q66.89 CLUBFOOT, CONGENITAL: ICD-10-CM

## 2025-07-23 DIAGNOSIS — L60.0 OC (ONYCHOCRYPTOSIS): Primary | ICD-10-CM

## 2025-07-23 DIAGNOSIS — M79.675 PAIN OF TOE OF LEFT FOOT: ICD-10-CM

## 2025-07-23 PROCEDURE — G8427 DOCREV CUR MEDS BY ELIG CLIN: HCPCS | Performed by: PODIATRIST

## 2025-07-23 PROCEDURE — G8417 CALC BMI ABV UP PARAM F/U: HCPCS | Performed by: PODIATRIST

## 2025-07-23 PROCEDURE — 99213 OFFICE O/P EST LOW 20 MIN: CPT | Performed by: PODIATRIST

## 2025-07-23 PROCEDURE — 1036F TOBACCO NON-USER: CPT | Performed by: PODIATRIST

## 2025-07-23 NOTE — PROGRESS NOTES
Patient presents for nail care and callus concerns. Bilateral foot pain. PCP Paulie Garibay,  LOV 6/18/2025  
topically 2 times daily., Disp: 60 g, Rfl: 2    SKYRIZI 150 MG/ML SOSY, , Disp: , Rfl:     clobetasol (TEMOVATE) 0.05 % cream, Apply topically 2 times daily Apply topically 2 times daily., Disp: 30 g, Rfl: 5    Ciclopirox 1 % SHAM, USE EVERY OTHER DAY AS A SHAMPOO, Disp: 120 mL, Rfl: 5    Clobetasol Propionate Emulsion 0.05 % FOAM, Apply 1 applicator topically daily as needed (psoriasis), Disp: 1 each, Rfl: 2    Fiber Select Gummies CHEW, Take 2 gummies daily as directed, Disp: 100 tablet, Rfl: 1    Multiple Vitamins-Minerals (MULTIVITAMIN WITH MINERALS) tablet, Take 1 tablet by mouth daily, Disp: 30 tablet, Rfl: 3    ammonium lactate (LAC-HYDRIN) 12 % lotion, Apply topically daily., Disp: 222 mL, Rfl: 5    ondansetron (ZOFRAN-ODT) 4 MG disintegrating tablet, Take 1 tablet by mouth every 8 hours as needed for Nausea or Vomiting, Disp: 20 tablet, Rfl: 1    Allergies:  Allergies   Allergen Reactions    Aspirin Other (See Comments)       Vitals:    07/23/25 1503   Weight: 122 kg (269 lb)   Height: 1.651 m (5' 5\")       Exam:  Neurovascular status unchanged.  Multiple nail dystrophy issues noted digits 1 through 5 bilaterally.  No maceration webspaces noted bilateral foot.  Callosities stable plantar forefoot regions bilaterally.  Clubfoot deformities stable bilateral foot.  Chronic venous insufficiency issues noted with varicosities present bilaterally.  Antalgic gait noted upon evaluation.      Diagnostic Studies:     No results found.      Procedures:    None    Plan Per Assessment  Peng was seen today for procedure.    Diagnoses and all orders for this visit:    OC (onychocryptosis)    Pain of toe of right foot    Pain of toe of left foot    Clubfoot, congenital    DiGeorge syndrome (HCC)    Seizure (HCC)    Difficulty walking      Evaluation and management  Debridement dystrophic/ingrown nails performed to patient tolerance.  We did discuss continued use of her good supportive shoe gear, bracing, and insoles with

## 2025-08-13 ENCOUNTER — TELEPHONE (OUTPATIENT)
Dept: FAMILY MEDICINE CLINIC | Age: 35
End: 2025-08-13

## 2025-08-13 DIAGNOSIS — Q66.89 CLUBFOOT, CONGENITAL: ICD-10-CM

## 2025-08-13 DIAGNOSIS — R26.2 DIFFICULTY WALKING: Primary | ICD-10-CM

## 2025-08-13 DIAGNOSIS — M25.372 INSTABILITY OF LEFT ANKLE JOINT: ICD-10-CM

## 2025-08-25 ENCOUNTER — TELEPHONE (OUTPATIENT)
Dept: FAMILY MEDICINE CLINIC | Age: 35
End: 2025-08-25

## (undated) DEVICE — TOWEL,OR,DSP,ST,BLUE,STD,6/PK,12PK/CS: Brand: MEDLINE

## (undated) DEVICE — ELECTRODE PT RET AD L9FT HI MOIST COND ADH HYDRGEL CORDED

## (undated) DEVICE — CAMERA STRYKER 1488 HD GEN

## (undated) DEVICE — APPLICATOR MEDICATED 26 CC SOLUTION HI LT ORNG CHLORAPREP

## (undated) DEVICE — SKIN AFFIX SURG ADHESIVE 72/CS 0.55ML: Brand: MEDLINE

## (undated) DEVICE — SHEET,DRAPE,40X58,STERILE: Brand: MEDLINE

## (undated) DEVICE — SYRINGE MED 10ML LUERLOCK TIP W/O SFTY DISP

## (undated) DEVICE — SET ENDO INSTR RED YEL LAPAROSCOPIC

## (undated) DEVICE — PMI PTFE COATED LAPAROSCOPIC WIRE L-HOOK 33 CM: Brand: PMI

## (undated) DEVICE — COOK ENDOSCOPIC CHOLANGIOGRAPHY SET: Brand: COOK

## (undated) DEVICE — TROCAR: Brand: KII SLEEVE

## (undated) DEVICE — INSUFFLATION NEEDLE TO ESTABLISH PNEUMOPERITONEUM.: Brand: INSUFFLATION NEEDLE

## (undated) DEVICE — NEEDLE HYPO 25GA L1.5IN BLU POLYPR HUB S STL REG BVL STR

## (undated) DEVICE — [HIGH FLOW INSUFFLATOR,  DO NOT USE IF PACKAGE IS DAMAGED,  KEEP DRY,  KEEP AWAY FROM SUNLIGHT,  PROTECT FROM HEAT AND RADIOACTIVE SOURCES.]: Brand: PNEUMOSURE

## (undated) DEVICE — GLOVE ORANGE PI 7 1/2   MSG9075

## (undated) DEVICE — DOUBLE BASIN SET: Brand: MEDLINE INDUSTRIES, INC.

## (undated) DEVICE — GOWN,SIRUS,NONRNF,SETINSLV,XL,20/CS: Brand: MEDLINE

## (undated) DEVICE — Z DISCONTINUED NO SUB IDED BAG SPEC RETRV M C240ML MOUTH 7.3MM L17CM SHFT 10MM NYL EZEE

## (undated) DEVICE — GARMENT,MEDLINE,DVT,INT,CALF,MED, GEN2: Brand: MEDLINE

## (undated) DEVICE — Z INACTIVE USE 2660664 SOLUTION IRRIG 3000ML 0.9% SOD CHL USP UROMATIC PLAS CONT

## (undated) DEVICE — MEDIA CONTRAST ISOVUE GLASS VIALS 250 50ML

## (undated) DEVICE — AGENT HEMSTAT W2XL4IN OXIDIZED REGENERATED CELOS ABSRB

## (undated) DEVICE — LAPAROSCOPIC SCISSORS: Brand: EPIX LAPAROSCOPIC SCISSORS

## (undated) DEVICE — TROCAR: Brand: KII FIOS FIRST ENTRY

## (undated) DEVICE — APPLIER CLP M L L11.4IN DIA10MM ENDOSCP ROT MULT FOR LIG

## (undated) DEVICE — PACK SURG LAP CHOLE CUSTOM

## (undated) DEVICE — PMI PTFE COATED LAPAROSCOPIC WIRE L-HOOK 44 CM: Brand: PMI

## (undated) DEVICE — MARKER,SKIN,WI/RULER AND LABELS: Brand: MEDLINE

## (undated) DEVICE — SET ENDO INSTR LAPAROSCOPIC INCISIONAL

## (undated) DEVICE — 20 ML SYRINGE REGULAR TIP: Brand: MONOJECT

## (undated) DEVICE — PUMP SUC IRR TBNG L10FT W/ HNDPC ASSEMB STRYKEFLOW 2

## (undated) DEVICE — SYRINGE 20ML LL S/C 50

## (undated) DEVICE — COVER,LIGHT HANDLE,FLX,1/PK: Brand: MEDLINE INDUSTRIES, INC.

## (undated) DEVICE — DRAPE 64X41IN RADIOLOGY C ARM EQUIP STER

## (undated) DEVICE — PLUMEPORT LAPAROSCOPIC SMOKE FILTRATION DEVICE: Brand: PLUMEPORT ACTIV

## (undated) DEVICE — SYRINGE MED 10ML TRNSLUC BRL PLUNG BLK MRK POLYPR CTRL